# Patient Record
Sex: MALE | Race: WHITE | Employment: OTHER | URBAN - METROPOLITAN AREA
[De-identification: names, ages, dates, MRNs, and addresses within clinical notes are randomized per-mention and may not be internally consistent; named-entity substitution may affect disease eponyms.]

---

## 2017-08-10 PROBLEM — Z95.828 PORT-A-CATH IN PLACE: Status: ACTIVE | Noted: 2017-08-10

## 2019-07-30 ENCOUNTER — HOSPITAL ENCOUNTER (OUTPATIENT)
Dept: VASCULAR LAB | Age: 69
Discharge: HOME | End: 2019-07-30
Payer: OTHER GOVERNMENT

## 2019-07-30 PROCEDURE — 93971 EXTREMITY STUDY: CPT

## 2019-08-06 ENCOUNTER — HOSPITAL ENCOUNTER (OUTPATIENT)
Dept: VASCULAR LAB | Age: 69
Discharge: HOME | End: 2019-08-06
Payer: OTHER GOVERNMENT

## 2019-08-06 ENCOUNTER — PRE-PROCEDURE TELEPHONE (OUTPATIENT)
Dept: CARDIAC CATH/INVASIVE PROCEDURES | Age: 69
End: 2019-08-06

## 2019-08-06 PROCEDURE — 93971 EXTREMITY STUDY: CPT

## 2019-08-07 ENCOUNTER — HOSPITAL ENCOUNTER (OUTPATIENT)
Dept: INTERVENTIONAL RADIOLOGY/VASCULAR | Age: 69
Discharge: HOME | End: 2019-08-07
Attending: INTERNAL MEDICINE | Admitting: INTERNAL MEDICINE
Payer: OTHER GOVERNMENT

## 2019-08-07 VITALS — HEIGHT: 70 IN | TEMPERATURE: 97.8 F | WEIGHT: 194 LBS | BODY MASS INDEX: 27.77 KG/M2

## 2019-08-07 LAB
ANION GAP SERPL CALCULATED.3IONS-SCNC: 10 MMOL/L (ref 3–16)
BUN BLDV-MCNC: 31 MG/DL (ref 7–20)
CALCIUM SERPL-MCNC: 8.6 MG/DL (ref 8.3–10.6)
CHLORIDE BLD-SCNC: 99 MMOL/L (ref 99–110)
CO2: 27 MMOL/L (ref 21–32)
CREAT SERPL-MCNC: 1.3 MG/DL (ref 0.8–1.3)
GFR AFRICAN AMERICAN: >60
GFR NON-AFRICAN AMERICAN: 54
GLUCOSE BLD-MCNC: 154 MG/DL (ref 70–99)
HCT VFR BLD CALC: 27.8 % (ref 40.5–52.5)
HEMOGLOBIN: 9.2 G/DL (ref 13.5–17.5)
INR BLD: 1.18 (ref 0.86–1.14)
MCH RBC QN AUTO: 36.8 PG (ref 26–34)
MCHC RBC AUTO-ENTMCNC: 33.1 G/DL (ref 31–36)
MCV RBC AUTO: 111 FL (ref 80–100)
PDW BLD-RTO: 20.3 % (ref 12.4–15.4)
PLATELET # BLD: 305 K/UL (ref 135–450)
PMV BLD AUTO: 6.7 FL (ref 5–10.5)
POTASSIUM SERPL-SCNC: 4 MMOL/L (ref 3.5–5.1)
PROTHROMBIN TIME: 13.4 SEC (ref 9.8–13)
RBC # BLD: 2.51 M/UL (ref 4.2–5.9)
SODIUM BLD-SCNC: 136 MMOL/L (ref 136–145)
WBC # BLD: 6 K/UL (ref 4–11)

## 2019-08-07 PROCEDURE — C1725 CATH, TRANSLUMIN NON-LASER: HCPCS

## 2019-08-07 PROCEDURE — 37248 TRLUML BALO ANGIOP 1ST VEIN: CPT | Performed by: RADIOLOGY

## 2019-08-07 PROCEDURE — 99153 MOD SED SAME PHYS/QHP EA: CPT | Performed by: RADIOLOGY

## 2019-08-07 PROCEDURE — 85027 COMPLETE CBC AUTOMATED: CPT

## 2019-08-07 PROCEDURE — 2709999900 HC NON-CHARGEABLE SUPPLY

## 2019-08-07 PROCEDURE — 6360000002 HC RX W HCPCS

## 2019-08-07 PROCEDURE — 85610 PROTHROMBIN TIME: CPT

## 2019-08-07 PROCEDURE — 36010 PLACE CATHETER IN VEIN: CPT | Performed by: RADIOLOGY

## 2019-08-07 PROCEDURE — 6360000004 HC RX CONTRAST MEDICATION: Performed by: RADIOLOGY

## 2019-08-07 PROCEDURE — C1769 GUIDE WIRE: HCPCS

## 2019-08-07 PROCEDURE — C1894 INTRO/SHEATH, NON-LASER: HCPCS

## 2019-08-07 PROCEDURE — C1887 CATHETER, GUIDING: HCPCS

## 2019-08-07 PROCEDURE — 80048 BASIC METABOLIC PNL TOTAL CA: CPT

## 2019-08-07 PROCEDURE — 75820 VEIN X-RAY ARM/LEG: CPT | Performed by: RADIOLOGY

## 2019-08-07 PROCEDURE — 2500000003 HC RX 250 WO HCPCS

## 2019-08-07 PROCEDURE — 99152 MOD SED SAME PHYS/QHP 5/>YRS: CPT | Performed by: RADIOLOGY

## 2019-08-07 RX ORDER — ATORVASTATIN CALCIUM 20 MG/1
20 TABLET, FILM COATED ORAL DAILY
COMMUNITY

## 2019-08-07 RX ORDER — DIPHENHYDRAMINE HCL 50 MG
50 CAPSULE ORAL NIGHTLY PRN
Status: ON HOLD | COMMUNITY
End: 2022-06-25

## 2019-08-07 RX ORDER — FUROSEMIDE 20 MG/1
20 TABLET ORAL 2 TIMES DAILY
Status: ON HOLD | COMMUNITY
End: 2022-06-25

## 2019-08-07 RX ORDER — IODIXANOL 320 MG/ML
100 INJECTION, SOLUTION INTRAVASCULAR
Status: COMPLETED | OUTPATIENT
Start: 2019-08-07 | End: 2019-08-07

## 2019-08-07 RX ORDER — PREGABALIN 150 MG/1
150 CAPSULE ORAL 2 TIMES DAILY
COMMUNITY

## 2019-08-07 RX ADMIN — IODIXANOL 100 ML: 320 INJECTION, SOLUTION INTRAVASCULAR at 13:13

## 2019-08-07 NOTE — H&P
Patient:  Mello Farley   :   1950      Relevant clinical history, particularly as it involves the pending procedure, was reviewed and discussed. The procedure including risks and benefits was discussed at length with the patient (or designated family member) and all questions were answered. Informed consent to proceed with the procedure was given. Vital signs were monitored and documented by the Radiology nurse. Targeted physical examination  Heart : regular rate and rhythm  Lungs : clear, breathing easily  Condition : stable    [facility] nurses notes reviewed and agreed. 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  Last one 2014    ENDOSCOPY, COLON, DIAGNOSTIC      Upper GI prior to Cholecystectomy    FOREIGN BODY REMOVAL      LEG AMPUTATION BELOW KNEE Left 14    LEG SURGERY Left 14    biopsy left lower leg    LIPOMA RESECTION      OTHER SURGICAL HISTORY Left 10/22/2014    LEFT SUBCLAVIAN PORT REMOVAL              OTHER SURGICAL HISTORY Right 10/27/14    INSERTION OF SINGLE LUMEN PORT A CATH    TONSILLECTOMY      TONSILLECTOMY      childhood    TUNNELED VENOUS PORT PLACEMENT Right     Power Port       Allergies:  Compazine [prochlorperazine maleate]    Medications:   Home Meds  Current Facility-Administered Medications on File Prior to Encounter   Medication Dose

## 2022-04-26 ENCOUNTER — HOSPITAL ENCOUNTER (OUTPATIENT)
Dept: ULTRASOUND IMAGING | Age: 72
Discharge: HOME | End: 2022-04-26
Payer: OTHER GOVERNMENT

## 2022-04-26 DIAGNOSIS — C40.22: ICD-10-CM

## 2022-04-26 DIAGNOSIS — N18.4 CHRONIC KIDNEY DISEASE, STAGE IV (SEVERE) (HCC): ICD-10-CM

## 2022-04-26 PROCEDURE — 76770 US EXAM ABDO BACK WALL COMP: CPT

## 2022-06-24 ENCOUNTER — HOSPITAL ENCOUNTER (INPATIENT)
Age: 72
LOS: 2 days | Discharge: HOME HEALTH | DRG: 565 | End: 2022-06-26
Attending: STUDENT IN AN ORGANIZED HEALTH CARE EDUCATION/TRAINING PROGRAM | Admitting: INTERNAL MEDICINE
Payer: OTHER GOVERNMENT

## 2022-06-24 ENCOUNTER — APPOINTMENT (OUTPATIENT)
Dept: GENERAL RADIOLOGY | Age: 72
DRG: 565 | End: 2022-06-24
Payer: OTHER GOVERNMENT

## 2022-06-24 DIAGNOSIS — R79.82 ELEVATED C-REACTIVE PROTEIN (CRP): ICD-10-CM

## 2022-06-24 DIAGNOSIS — L03.116 CELLULITIS OF LEFT LOWER LIMB: Primary | ICD-10-CM

## 2022-06-24 DIAGNOSIS — R70.0 ELEVATED ERYTHROCYTE SEDIMENTATION RATE: ICD-10-CM

## 2022-06-24 PROBLEM — L03.90 CELLULITIS: Status: ACTIVE | Noted: 2022-06-24

## 2022-06-24 LAB
ALBUMIN SERPL-MCNC: 3.9 G/DL (ref 3.4–5)
ALP BLD-CCNC: 70 U/L (ref 40–129)
ALT SERPL-CCNC: 14 U/L (ref 10–40)
ANION GAP SERPL CALCULATED.3IONS-SCNC: 10 MMOL/L (ref 3–16)
AST SERPL-CCNC: 14 U/L (ref 15–37)
BASOPHILS ABSOLUTE: 0 K/UL (ref 0–0.2)
BASOPHILS RELATIVE PERCENT: 0.3 %
BILIRUB SERPL-MCNC: 0.4 MG/DL (ref 0–1)
BILIRUBIN DIRECT: <0.2 MG/DL (ref 0–0.3)
BILIRUBIN, INDIRECT: ABNORMAL MG/DL (ref 0–1)
BUN BLDV-MCNC: 39 MG/DL (ref 7–20)
C-REACTIVE PROTEIN: 32.8 MG/L (ref 0–5.1)
CALCIUM SERPL-MCNC: 9 MG/DL (ref 8.3–10.6)
CHLORIDE BLD-SCNC: 103 MMOL/L (ref 99–110)
CO2: 26 MMOL/L (ref 21–32)
CREAT SERPL-MCNC: 2.5 MG/DL (ref 0.8–1.3)
EOSINOPHILS ABSOLUTE: 0.1 K/UL (ref 0–0.6)
EOSINOPHILS RELATIVE PERCENT: 1.7 %
GFR AFRICAN AMERICAN: 31
GFR NON-AFRICAN AMERICAN: 25
GLUCOSE BLD-MCNC: 130 MG/DL (ref 70–99)
GLUCOSE BLD-MCNC: 82 MG/DL (ref 70–99)
HCT VFR BLD CALC: 36.2 % (ref 40.5–52.5)
HEMOGLOBIN: 12.2 G/DL (ref 13.5–17.5)
LACTIC ACID: 1.1 MMOL/L (ref 0.4–2)
LYMPHOCYTES ABSOLUTE: 0.8 K/UL (ref 1–5.1)
LYMPHOCYTES RELATIVE PERCENT: 12.7 %
MCH RBC QN AUTO: 34.6 PG (ref 26–34)
MCHC RBC AUTO-ENTMCNC: 33.6 G/DL (ref 31–36)
MCV RBC AUTO: 103 FL (ref 80–100)
MONOCYTES ABSOLUTE: 0.5 K/UL (ref 0–1.3)
MONOCYTES RELATIVE PERCENT: 8.5 %
NEUTROPHILS ABSOLUTE: 4.8 K/UL (ref 1.7–7.7)
NEUTROPHILS RELATIVE PERCENT: 76.8 %
PDW BLD-RTO: 12.5 % (ref 12.4–15.4)
PERFORMED ON: ABNORMAL
PLATELET # BLD: 238 K/UL (ref 135–450)
PMV BLD AUTO: 7.7 FL (ref 5–10.5)
POTASSIUM REFLEX MAGNESIUM: 4.3 MMOL/L (ref 3.5–5.1)
RBC # BLD: 3.51 M/UL (ref 4.2–5.9)
SEDIMENTATION RATE, ERYTHROCYTE: 39 MM/HR (ref 0–20)
SODIUM BLD-SCNC: 139 MMOL/L (ref 136–145)
TOTAL PROTEIN: 6.6 G/DL (ref 6.4–8.2)
WBC # BLD: 6.3 K/UL (ref 4–11)

## 2022-06-24 PROCEDURE — 2580000003 HC RX 258: Performed by: INTERNAL MEDICINE

## 2022-06-24 PROCEDURE — 86140 C-REACTIVE PROTEIN: CPT

## 2022-06-24 PROCEDURE — 85652 RBC SED RATE AUTOMATED: CPT

## 2022-06-24 PROCEDURE — 73590 X-RAY EXAM OF LOWER LEG: CPT

## 2022-06-24 PROCEDURE — 99285 EMERGENCY DEPT VISIT HI MDM: CPT

## 2022-06-24 PROCEDURE — 1200000000 HC SEMI PRIVATE

## 2022-06-24 PROCEDURE — 6360000002 HC RX W HCPCS: Performed by: INTERNAL MEDICINE

## 2022-06-24 PROCEDURE — 83605 ASSAY OF LACTIC ACID: CPT

## 2022-06-24 PROCEDURE — 85025 COMPLETE CBC W/AUTO DIFF WBC: CPT

## 2022-06-24 PROCEDURE — 80048 BASIC METABOLIC PNL TOTAL CA: CPT

## 2022-06-24 PROCEDURE — 6370000000 HC RX 637 (ALT 250 FOR IP): Performed by: INTERNAL MEDICINE

## 2022-06-24 PROCEDURE — 2580000003 HC RX 258: Performed by: STUDENT IN AN ORGANIZED HEALTH CARE EDUCATION/TRAINING PROGRAM

## 2022-06-24 PROCEDURE — 80076 HEPATIC FUNCTION PANEL: CPT

## 2022-06-24 PROCEDURE — 6360000002 HC RX W HCPCS: Performed by: STUDENT IN AN ORGANIZED HEALTH CARE EDUCATION/TRAINING PROGRAM

## 2022-06-24 RX ORDER — INSULIN LISPRO 100 [IU]/ML
0-6 INJECTION, SOLUTION INTRAVENOUS; SUBCUTANEOUS NIGHTLY
Status: DISCONTINUED | OUTPATIENT
Start: 2022-06-24 | End: 2022-06-26 | Stop reason: HOSPADM

## 2022-06-24 RX ORDER — SODIUM CHLORIDE 0.9 % (FLUSH) 0.9 %
5-40 SYRINGE (ML) INJECTION EVERY 12 HOURS SCHEDULED
Status: DISCONTINUED | OUTPATIENT
Start: 2022-06-24 | End: 2022-06-26 | Stop reason: HOSPADM

## 2022-06-24 RX ORDER — ENOXAPARIN SODIUM 100 MG/ML
40 INJECTION SUBCUTANEOUS DAILY
Status: DISCONTINUED | OUTPATIENT
Start: 2022-06-24 | End: 2022-06-26

## 2022-06-24 RX ORDER — POLYETHYLENE GLYCOL 3350 17 G/17G
17 POWDER, FOR SOLUTION ORAL DAILY PRN
Status: DISCONTINUED | OUTPATIENT
Start: 2022-06-24 | End: 2022-06-26 | Stop reason: HOSPADM

## 2022-06-24 RX ORDER — SODIUM CHLORIDE 0.9 % (FLUSH) 0.9 %
5-40 SYRINGE (ML) INJECTION PRN
Status: DISCONTINUED | OUTPATIENT
Start: 2022-06-24 | End: 2022-06-26 | Stop reason: HOSPADM

## 2022-06-24 RX ORDER — OXYCODONE HYDROCHLORIDE AND ACETAMINOPHEN 5; 325 MG/1; MG/1
1 TABLET ORAL EVERY 4 HOURS PRN
Status: DISCONTINUED | OUTPATIENT
Start: 2022-06-24 | End: 2022-06-26 | Stop reason: HOSPADM

## 2022-06-24 RX ORDER — ONDANSETRON 2 MG/ML
4 INJECTION INTRAMUSCULAR; INTRAVENOUS EVERY 6 HOURS PRN
Status: DISCONTINUED | OUTPATIENT
Start: 2022-06-24 | End: 2022-06-26 | Stop reason: HOSPADM

## 2022-06-24 RX ORDER — DEXTROSE MONOHYDRATE 50 MG/ML
100 INJECTION, SOLUTION INTRAVENOUS PRN
Status: DISCONTINUED | OUTPATIENT
Start: 2022-06-24 | End: 2022-06-26 | Stop reason: HOSPADM

## 2022-06-24 RX ORDER — INSULIN LISPRO 100 [IU]/ML
0-12 INJECTION, SOLUTION INTRAVENOUS; SUBCUTANEOUS
Status: DISCONTINUED | OUTPATIENT
Start: 2022-06-25 | End: 2022-06-26 | Stop reason: HOSPADM

## 2022-06-24 RX ORDER — ACETAMINOPHEN 325 MG/1
650 TABLET ORAL EVERY 6 HOURS PRN
Status: DISCONTINUED | OUTPATIENT
Start: 2022-06-24 | End: 2022-06-26 | Stop reason: HOSPADM

## 2022-06-24 RX ORDER — SODIUM CHLORIDE 9 MG/ML
INJECTION, SOLUTION INTRAVENOUS PRN
Status: DISCONTINUED | OUTPATIENT
Start: 2022-06-24 | End: 2022-06-26 | Stop reason: HOSPADM

## 2022-06-24 RX ORDER — ACETAMINOPHEN 650 MG/1
650 SUPPOSITORY RECTAL EVERY 6 HOURS PRN
Status: DISCONTINUED | OUTPATIENT
Start: 2022-06-24 | End: 2022-06-26 | Stop reason: HOSPADM

## 2022-06-24 RX ORDER — ONDANSETRON 4 MG/1
4 TABLET, ORALLY DISINTEGRATING ORAL EVERY 8 HOURS PRN
Status: DISCONTINUED | OUTPATIENT
Start: 2022-06-24 | End: 2022-06-26 | Stop reason: HOSPADM

## 2022-06-24 RX ORDER — SODIUM CHLORIDE 9 MG/ML
INJECTION, SOLUTION INTRAVENOUS CONTINUOUS
Status: DISCONTINUED | OUTPATIENT
Start: 2022-06-24 | End: 2022-06-26 | Stop reason: HOSPADM

## 2022-06-24 RX ADMIN — VANCOMYCIN HYDROCHLORIDE 1500 MG: 10 INJECTION, POWDER, LYOPHILIZED, FOR SOLUTION INTRAVENOUS at 21:43

## 2022-06-24 RX ADMIN — OXYCODONE AND ACETAMINOPHEN 1 TABLET: 5; 325 TABLET ORAL at 23:58

## 2022-06-24 RX ADMIN — SODIUM CHLORIDE, PRESERVATIVE FREE 10 ML: 5 INJECTION INTRAVENOUS at 21:43

## 2022-06-24 RX ADMIN — SODIUM CHLORIDE: 9 INJECTION, SOLUTION INTRAVENOUS at 21:42

## 2022-06-24 RX ADMIN — ENOXAPARIN SODIUM 40 MG: 40 INJECTION SUBCUTANEOUS at 23:37

## 2022-06-24 ASSESSMENT — PAIN DESCRIPTION - FREQUENCY
FREQUENCY: INTERMITTENT
FREQUENCY: INTERMITTENT

## 2022-06-24 ASSESSMENT — PAIN - FUNCTIONAL ASSESSMENT
PAIN_FUNCTIONAL_ASSESSMENT: ACTIVITIES ARE NOT PREVENTED
PAIN_FUNCTIONAL_ASSESSMENT: 0-10
PAIN_FUNCTIONAL_ASSESSMENT: ACTIVITIES ARE NOT PREVENTED

## 2022-06-24 ASSESSMENT — ENCOUNTER SYMPTOMS
BACK PAIN: 0
ABDOMINAL DISTENTION: 0
SHORTNESS OF BREATH: 0
CHOKING: 0
ABDOMINAL PAIN: 0
CHEST TIGHTNESS: 0
WHEEZING: 0
NAUSEA: 0
STRIDOR: 0
DIARRHEA: 0
SINUS PRESSURE: 0
COUGH: 0
VOMITING: 0
SORE THROAT: 0
BLOOD IN STOOL: 0
CONSTIPATION: 1

## 2022-06-24 ASSESSMENT — PAIN DESCRIPTION - ONSET
ONSET: ON-GOING
ONSET: ON-GOING

## 2022-06-24 ASSESSMENT — PAIN SCALES - GENERAL
PAINLEVEL_OUTOF10: 5
PAINLEVEL_OUTOF10: 6
PAINLEVEL_OUTOF10: 0
PAINLEVEL_OUTOF10: 0

## 2022-06-24 ASSESSMENT — PAIN DESCRIPTION - LOCATION
LOCATION: LEG
LOCATION: LEG

## 2022-06-24 ASSESSMENT — PAIN DESCRIPTION - DESCRIPTORS
DESCRIPTORS: ACHING;PRESSURE
DESCRIPTORS: ACHING;PRESSURE

## 2022-06-24 ASSESSMENT — PAIN DESCRIPTION - ORIENTATION
ORIENTATION: LEFT;LOWER
ORIENTATION: LEFT;LOWER

## 2022-06-24 ASSESSMENT — PAIN DESCRIPTION - PAIN TYPE
TYPE: ACUTE PAIN
TYPE: ACUTE PAIN

## 2022-06-24 NOTE — ED PROVIDER NOTES
ED Attending Attestation Note     Date of evaluation: 6/24/2022    I supervised the care provided by the resident physician, Riky Herrera MD.     I have seen and examined the patient, agree with the workup, evaluation, management and diagnosis. The care plan has been discussed. My assessment reveals a 66 y.o. 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 Denies any fever/ chills. States has been on oral ATB for the past 3-4 days )     No obvious acute distress at this time. Left BKA with 5-day history of erythema, swelling, numbness in the same leg/stump. No fever. Has not been seen by his PCP or by anyone prior to today. His [facility] physician was nice enough to call him in a prescription for Keflex, he is on day 5 of Keflex today, though there is still marked inflammation. He is afebrile and hemodynamically stable here in the ER. There is no obvious drainage, there is warmth about the lower third of the stump. We will get plain film x-ray, CBC, BMP and inflammatory markers      ED data including laboratory and imaging studies were reviewed by me and considered in medical decision making, and I agree with the interpretations as documented, with the following exceptions: None      Clinical Impression     1. Cellulitis of left lower limb    2. Elevated erythrocyte sedimentation rate    3. Elevated C-reactive protein (CRP)          Disposition     DISPOSITION Admitted 06/24/2022 07:06:14 PM      Ernie Batres MD  Emergency Medicine  [facility]       Herrera Mckeon MD  06/26/22 [id number]

## 2022-06-24 NOTE — PROGRESS NOTES
Clinical Pharmacy Consult Note       Pharmacy consulted by Dr. Jessica Chan in ED to order first dose of vancomycin. Indication :   ssti    Patient Data:     Recent Labs     06/24/22  1729      K 4.3      CO2 26   BUN 39*   CREATININE 2.5*       CrCl cannot be calculated (Unknown ideal weight. ). Recent Labs     06/24/22  1729   WBC 6.3   HGB 12.2*   HCT 36.2*   .0*          Height:  @[employer](11:LAST)@  Weight: @[employer](14:LAST)@    Plan: Will order vancomycin 1500 x 1 dose, to be administered in ED. Please note this consult covers ED vancomycin dose only. If admitting provider would like further vancomycin dose management by pharmacy, please place additional consult order. Thank you for the consult. Please call with questions.   Karl Alcala, PharmD  Main Pharmacy: [id number]

## 2022-06-24 NOTE — ED PROVIDER NOTES
1 [facility]  EMERGENCY DEPARTMENT ENCOUNTER          [facility] RESIDENT NOTE       Date of evaluation: 6/24/2022    Chief Complaint     Leg Swelling (left lower BKA states has been swelling the past few days and is unable to get prostetic on. Denies any fever/ chills. States has been on oral ATB for the past 3-4 days )      History of Present Illness     Kirsten Blount is a 66 y.o. 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 Patient reports that since last Saturday he has had swelling at his left BKA site and he is unable to get his prosthetic leg on. He endorses erythema, swelling, tenderness to palpation that has improved on a course of oral Keflex given to him by Dr. Sam Desir. He is currently on day 4 of his 7-day antibiotic course, with improvement in pain, swelling, erythema. He noticed a wound at the bottom of his BKA which he believes could have been the source of his infection. He has full range of motion and strength in his left extremity. He denies any fevers or chills, nausea, vomiting, diarrhea. He had leg surgery on 12-8-2014 due to his osteosarcoma. He has not been on chemotherapy or radiation therapy for many years. He denies any history of skin infections or prior abscesses. He is concerned on how he will get home and move around with his prosthetic leg not fitting due to the swelling. Review of Systems     Review of Systems   Constitutional: Negative for activity change, chills, fatigue and fever. HENT: Negative for sinus pressure, sneezing and sore throat. Eyes: Negative for visual disturbance. Respiratory: Negative for cough, choking, chest tightness, shortness of breath, wheezing and stridor. Cardiovascular: Negative for chest pain, palpitations and leg swelling.    Gastrointestinal: Positive for constipation. Negative for abdominal distention, abdominal pain, blood in stool, diarrhea, nausea and vomiting. Endocrine: Negative for polyuria. Genitourinary: Negative for difficulty urinating and dysuria. Musculoskeletal: Positive for joint swelling. Negative for arthralgias, back pain and neck pain. Left lower extremity swelling, erythema, tenderness. Neurological: Positive for weakness (Chronic right lower extremity weakness). Negative for dizziness, tremors, syncope, light-headedness and headaches. Psychiatric/Behavioral: Negative for agitation, behavioral problems and confusion. Past Medical, Surgical, Family, and Social History     He has a past medical history of Arthritis, Arthritis, [address] Cancer ([facility] Chondroblastic osteosarcoma ([facility] Diabetes mellitus ([facility] Diabetes mellitus ([address] Hyperlipidemia, Osteosarcoma of bone ([facility] and Unspecified cerebral artery occlusion with cerebral infarction. He has a past surgical history that includes Cholecystectomy; lipoma resection (2013); Foreign Body Removal; Colonoscopy; Leg Surgery (Left, 8/18/14); Tonsillectomy; other surgical history (Left, 10/22/2014); other surgical history (Right, 10/27/14); Tonsillectomy; Abdomen surgery; Cholecystectomy (1977); Colonoscopy; Endoscopy, colon, diagnostic; Tunneled venous port placement (Right); and Leg amputation below knee (Left, 12/08/14). His family history includes Emphysema in his mother; Other in his brother. He reports that he quit smoking about 40 years ago. He has a 12.00 pack-year smoking history. He does not have any smokeless tobacco history on file. He reports current alcohol use. He reports that he does not use drugs. Medications     Previous Medications    AMOXICILLIN (AMOXIL) 500 MG CAPSULE    Take 4 tablets all at once, one hour prior to dental procedure.     ASPIRIN 81 MG TABLET    Take 81 mg by mouth daily    ATORVASTATIN (LIPITOR) 20 MG TABLET    Take 20 mg by mouth daily    DIPHENHYDRAMINE (BENADRYL) 50 MG CAPSULE    Take 50 mg by mouth nightly as needed for Itching    FUROSEMIDE (LASIX) 20 MG TABLET    Take 20 mg by mouth 2 times daily Indications: for 7 days, started 8/6/19    GABAPENTIN (NEURONTIN) 100 MG CAPSULE    Take 100 mg by mouth 3 times daily     GLIPIZIDE (GLUCOTROL) 10 MG TABLET    Take 10 mg by mouth 2 times daily (before meals). INSULIN GLARGINE (TOUJEO SOLOSTAR SC)    Inject 20 Units into the skin daily    METFORMIN (GLUCOPHAGE) 500 MG TABLET    Take 850 mg by mouth 2 times daily (with meals)     PREGABALIN (LYRICA) 150 MG CAPSULE    Take 150 mg by mouth 2 times daily. SIMVASTATIN (ZOCOR) 40 MG TABLET    Take 20 mg by mouth nightly. VITAMIN D (CHOLECALCIFEROL) 1000 UNIT TABS TABLET    Take 1,000 Units by mouth daily Indications: 2 tablets daily       Allergies     He is allergic to compazine [prochlorperazine maleate]. Physical Exam     INITIAL VITALS: BP: 109/84, Temp: 97.6 °F (36.4 °C), Heart Rate: 58, Resp: 18, SpO2: 99 %   Physical Exam  Constitutional:       General: He is not in acute distress. HENT:      Head: Normocephalic. Right Ear: External ear normal.      Left Ear: External ear normal.      Nose: Nose normal.      Mouth/Throat:      Mouth: Mucous membranes are moist.      Pharynx: Oropharynx is clear. No oropharyngeal exudate. Eyes:      Extraocular Movements: Extraocular movements intact. Conjunctiva/sclera: Conjunctivae normal.      Pupils: Pupils are equal, round, and reactive to light. Cardiovascular:      Rate and Rhythm: Normal rate and regular rhythm. Pulses: Normal pulses. Heart sounds: Normal heart sounds. No murmur heard. No gallop. Pulmonary:      Effort: Pulmonary effort is normal. No respiratory distress. Breath sounds: Normal breath sounds. No stridor. No wheezing, rhonchi or rales. Abdominal:      General: Bowel sounds are normal. There is no distension.       Palpations: Abdomen is soft. Tenderness: There is no abdominal tenderness. There is no guarding or rebound. Musculoskeletal:      Cervical back: Normal range of motion. No rigidity. Left lower leg: Edema present. Comments: Patient has a left-sided BKA. He has a scabbed over wound at the distal end of the extremity. On the anterior distal portion of the left lower extremity he has erythema, swelling, tenderness to palpation that does not extend to the knee. He has full range of motion with no sensory deficits. He has full strength in the left lower extremity with 4+ strength in the right lower extremity. There is no discharge or bleeding from the extremity. Skin:     General: Skin is warm. Coloration: Skin is not jaundiced or pale. Findings: Erythema and lesion present. No bruising. Neurological:      Mental Status: He is alert and oriented to person, place, and time. Mental status is at baseline. Cranial Nerves: No cranial nerve deficit. Sensory: No sensory deficit. Motor: No weakness. Psychiatric:         Mood and Affect: Mood normal.         Behavior: Behavior normal.         Diagnostic Results       RADIOLOGY:  XR TIBIA FIBULA LEFT (2 VIEWS)   Final Result      Diffuse swelling. No acute osseous findings.           LABS:   Results for orders placed or performed during the hospital encounter of 06/24/22   CBC with Auto Differential   Result Value Ref Range    WBC 6.3 4.0 - 11.0 K/uL    RBC 3.51 (L) 4.20 - 5.90 M/uL    Hemoglobin 12.2 (L) 13.5 - 17.5 g/dL    Hematocrit 36.2 (L) 40.5 - 52.5 %    .0 (H) 80.0 - 100.0 fL    MCH 34.6 (H) 26.0 - 34.0 pg    MCHC 33.6 31.0 - 36.0 g/dL    RDW 12.5 12.4 - 15.4 %    Platelets 230 365 - 705 K/uL    MPV 7.7 5.0 - 10.5 fL    Neutrophils % 76.8 %    Lymphocytes % 12.7 %    Monocytes % 8.5 %    Eosinophils % 1.7 %    Basophils % 0.3 %    Neutrophils Absolute 4.8 1.7 - 7.7 K/uL    Lymphocytes Absolute 0.8 (L) 1.0 - 5.1 K/uL Monocytes Absolute 0.5 0.0 - 1.3 K/uL    Eosinophils Absolute 0.1 0.0 - 0.6 K/uL    Basophils Absolute 0.0 0.0 - 0.2 K/uL   Basic Metabolic Panel w/ Reflex to MG   Result Value Ref Range    Sodium 139 136 - 145 mmol/L    Potassium reflex Magnesium 4.3 3.5 - 5.1 mmol/L    Chloride 103 99 - 110 mmol/L    CO2 26 21 - 32 mmol/L    Anion Gap 10 3 - 16    Glucose 82 70 - 99 mg/dL    BUN 39 (H) 7 - 20 mg/dL    CREATININE 2.5 (H) 0.8 - 1.3 mg/dL    GFR Non-African American 25 (A) >60    GFR  31 (A) >60    Calcium 9.0 8.3 - 10.6 mg/dL   Hepatic Function Panel   Result Value Ref Range    Total Protein 6.6 6.4 - 8.2 g/dL    Albumin 3.9 3.4 - 5.0 g/dL    Alkaline Phosphatase 70 40 - 129 U/L    ALT 14 10 - 40 U/L    AST 14 (L) 15 - 37 U/L    Total Bilirubin 0.4 0.0 - 1.0 mg/dL    Bilirubin, Direct <0.2 0.0 - 0.3 mg/dL    Bilirubin, Indirect see below 0.0 - 1.0 mg/dL   Lactic Acid   Result Value Ref Range    Lactic Acid 1.1 0.4 - 2.0 mmol/L   Sedimentation Rate   Result Value Ref Range    Sed Rate 39 (H) 0 - 20 mm/Hr   C-Reactive Protein   Result Value Ref Range    CRP 32.8 (H) 0.0 - 5.1 mg/L       ED BEDSIDE ULTRASOUND:  No results found. RECENT VITALS:  BP: 109/84, Temp: 97.6 °F (36.4 °C),Heart Rate: 58, Resp: 18, SpO2: 99 %     Procedures     None    ED Course     Nursing Notes, Past Medical Hx, Past Surgical Hx, Social Hx, Allergies, and FamilyHx were reviewed. The patient was giventhe following medications:  No orders of the defined types were placed in this encounter. CONSULTS:  IP CONSULT TO HOSPITALIST  IP CONSULT TO [address] [place] [address] / ASSESSMENT / Michelle Timur is a 66 y.o. 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   Given that the patient has persistent erythema and swelling in the BKA site despite being on day 3 or 4 of his antibiotic treatment there is a concern for a deeper infection. X-ray did not show any acute findings but patient may require an MRI to further assess the joint. He has a history of cancer and metastasis but is not currently undergoing chemotherapy. He has a greatly elevated CRP and ESR. This patient was also evaluated by the attending physician. All care plans were discussed and agreed upon. Clinical Impression     Cellulitis of the left lower extremity    Disposition     PATIENT REFERRED TO:  No follow-up provider specified.     DISCHARGE MEDICATIONS:  New Prescriptions    No medications on file       DISPOSITION    Admit to hospitalist under observation     Kurtis Phoenix, MD  Resident  06/24/22 [id number]

## 2022-06-25 LAB
ANION GAP SERPL CALCULATED.3IONS-SCNC: 12 MMOL/L (ref 3–16)
BASOPHILS ABSOLUTE: 0 K/UL (ref 0–0.2)
BASOPHILS RELATIVE PERCENT: 0.7 %
BILIRUBIN URINE: NEGATIVE
BLOOD, URINE: ABNORMAL
BUN BLDV-MCNC: 43 MG/DL (ref 7–20)
C-REACTIVE PROTEIN: 24.5 MG/L (ref 0–5.1)
C3 COMPLEMENT: 108.6 MG/DL (ref 90–180)
C4 COMPLEMENT: 36.5 MG/DL (ref 10–40)
CALCIUM SERPL-MCNC: 9.1 MG/DL (ref 8.3–10.6)
CHLORIDE BLD-SCNC: 106 MMOL/L (ref 99–110)
CLARITY: CLEAR
CO2: 24 MMOL/L (ref 21–32)
COLOR: YELLOW
CREAT SERPL-MCNC: 2.6 MG/DL (ref 0.8–1.3)
CREATININE URINE: 72 MG/DL (ref 39–259)
CREATININE URINE: <4.2 MG/DL (ref 39–259)
EOSINOPHILS ABSOLUTE: 0.1 K/UL (ref 0–0.6)
EOSINOPHILS RELATIVE PERCENT: 2.4 %
EPITHELIAL CELLS, UA: NORMAL /HPF (ref 0–5)
GFR AFRICAN AMERICAN: 29
GFR NON-AFRICAN AMERICAN: 24
GLUCOSE BLD-MCNC: 116 MG/DL (ref 70–99)
GLUCOSE BLD-MCNC: 153 MG/DL (ref 70–99)
GLUCOSE BLD-MCNC: 169 MG/DL (ref 70–99)
GLUCOSE BLD-MCNC: 193 MG/DL (ref 70–99)
GLUCOSE BLD-MCNC: 204 MG/DL (ref 70–99)
GLUCOSE BLD-MCNC: 90 MG/DL (ref 70–99)
GLUCOSE URINE: NEGATIVE MG/DL
HAV IGM SER IA-ACNC: NORMAL
HCT VFR BLD CALC: 36.9 % (ref 40.5–52.5)
HEMOGLOBIN: 12.2 G/DL (ref 13.5–17.5)
HEPATITIS B CORE IGM ANTIBODY: NORMAL
HEPATITIS B SURFACE ANTIGEN INTERPRETATION: NORMAL
HEPATITIS C ANTIBODY INTERPRETATION: NORMAL
KETONES, URINE: NEGATIVE MG/DL
LEUKOCYTE ESTERASE, URINE: NEGATIVE
LYMPHOCYTES ABSOLUTE: 1 K/UL (ref 1–5.1)
LYMPHOCYTES RELATIVE PERCENT: 19.7 %
MCH RBC QN AUTO: 34.4 PG (ref 26–34)
MCHC RBC AUTO-ENTMCNC: 33.1 G/DL (ref 31–36)
MCV RBC AUTO: 103.9 FL (ref 80–100)
MICROSCOPIC EXAMINATION: YES
MONOCYTES ABSOLUTE: 0.4 K/UL (ref 0–1.3)
MONOCYTES RELATIVE PERCENT: 7.5 %
MRSA SCREEN RT-PCR: NORMAL
NEUTROPHILS ABSOLUTE: 3.6 K/UL (ref 1.7–7.7)
NEUTROPHILS RELATIVE PERCENT: 69.7 %
NITRITE, URINE: NEGATIVE
PDW BLD-RTO: 12.5 % (ref 12.4–15.4)
PERFORMED ON: ABNORMAL
PERFORMED ON: NORMAL
PH UA: 6 (ref 5–8)
PLATELET # BLD: 241 K/UL (ref 135–450)
PMV BLD AUTO: 7.8 FL (ref 5–10.5)
POTASSIUM REFLEX MAGNESIUM: 4.5 MMOL/L (ref 3.5–5.1)
PROTEIN PROTEIN: 13 MG/DL
PROTEIN UA: NEGATIVE MG/DL
PROTEIN/CREAT RATIO: 0.2 MG/DL
RBC # BLD: 3.55 M/UL (ref 4.2–5.9)
RBC UA: NORMAL /HPF (ref 0–4)
SODIUM BLD-SCNC: 142 MMOL/L (ref 136–145)
SODIUM URINE: 96 MMOL/L
SPECIFIC GRAVITY UA: 1.02 (ref 1–1.03)
URINE REFLEX TO CULTURE: ABNORMAL
URINE TYPE: ABNORMAL
UROBILINOGEN, URINE: 0.2 E.U./DL
VANCOMYCIN RANDOM: 17.4 UG/ML
WBC # BLD: 5.2 K/UL (ref 4–11)
WBC UA: NORMAL /HPF (ref 0–5)

## 2022-06-25 PROCEDURE — 92526 ORAL FUNCTION THERAPY: CPT

## 2022-06-25 PROCEDURE — 36415 COLL VENOUS BLD VENIPUNCTURE: CPT

## 2022-06-25 PROCEDURE — 6370000000 HC RX 637 (ALT 250 FOR IP): Performed by: INTERNAL MEDICINE

## 2022-06-25 PROCEDURE — 6360000002 HC RX W HCPCS: Performed by: INTERNAL MEDICINE

## 2022-06-25 PROCEDURE — 92610 EVALUATE SWALLOWING FUNCTION: CPT

## 2022-06-25 PROCEDURE — 82784 ASSAY IGA/IGD/IGG/IGM EACH: CPT

## 2022-06-25 PROCEDURE — 86160 COMPLEMENT ANTIGEN: CPT

## 2022-06-25 PROCEDURE — 82570 ASSAY OF URINE CREATININE: CPT

## 2022-06-25 PROCEDURE — 1200000000 HC SEMI PRIVATE

## 2022-06-25 PROCEDURE — 83883 ASSAY NEPHELOMETRY NOT SPEC: CPT

## 2022-06-25 PROCEDURE — 87390 HIV-1 AG IA: CPT

## 2022-06-25 PROCEDURE — 80048 BASIC METABOLIC PNL TOTAL CA: CPT

## 2022-06-25 PROCEDURE — 2580000003 HC RX 258: Performed by: INTERNAL MEDICINE

## 2022-06-25 PROCEDURE — 99253 IP/OBS CNSLTJ NEW/EST LOW 45: CPT | Performed by: INTERNAL MEDICINE

## 2022-06-25 PROCEDURE — 86140 C-REACTIVE PROTEIN: CPT

## 2022-06-25 PROCEDURE — 84156 ASSAY OF PROTEIN URINE: CPT

## 2022-06-25 PROCEDURE — 80074 ACUTE HEPATITIS PANEL: CPT

## 2022-06-25 PROCEDURE — 84300 ASSAY OF URINE SODIUM: CPT

## 2022-06-25 PROCEDURE — 86038 ANTINUCLEAR ANTIBODIES: CPT

## 2022-06-25 PROCEDURE — 86701 HIV-1ANTIBODY: CPT

## 2022-06-25 PROCEDURE — 85025 COMPLETE CBC W/AUTO DIFF WBC: CPT

## 2022-06-25 PROCEDURE — 84155 ASSAY OF PROTEIN SERUM: CPT

## 2022-06-25 PROCEDURE — 86702 HIV-2 ANTIBODY: CPT

## 2022-06-25 PROCEDURE — 84165 PROTEIN E-PHORESIS SERUM: CPT

## 2022-06-25 PROCEDURE — 80202 ASSAY OF VANCOMYCIN: CPT

## 2022-06-25 PROCEDURE — 81001 URINALYSIS AUTO W/SCOPE: CPT

## 2022-06-25 PROCEDURE — 87641 MR-STAPH DNA AMP PROBE: CPT

## 2022-06-25 RX ORDER — MELATONIN 10 MG
10 CAPSULE ORAL NIGHTLY PRN
COMMUNITY

## 2022-06-25 RX ORDER — PREGABALIN 75 MG/1
75 CAPSULE ORAL 2 TIMES DAILY
Status: DISCONTINUED | OUTPATIENT
Start: 2022-06-25 | End: 2022-06-26 | Stop reason: HOSPADM

## 2022-06-25 RX ORDER — CALCIUM CARBONATE 200(500)MG
500 TABLET,CHEWABLE ORAL 3 TIMES DAILY PRN
Status: DISCONTINUED | OUTPATIENT
Start: 2022-06-25 | End: 2022-06-26 | Stop reason: HOSPADM

## 2022-06-25 RX ORDER — CEPHALEXIN 500 MG/1
500 CAPSULE ORAL 3 TIMES DAILY
Status: ON HOLD | COMMUNITY
Start: 2022-06-20 | End: 2022-06-26 | Stop reason: HOSPADM

## 2022-06-25 RX ORDER — ACETAMINOPHEN/DIPHENHYDRAMINE 500MG-25MG
1 TABLET ORAL NIGHTLY PRN
COMMUNITY

## 2022-06-25 RX ORDER — ASPIRIN 81 MG/1
81 TABLET ORAL DAILY
Status: DISCONTINUED | OUTPATIENT
Start: 2022-06-25 | End: 2022-06-26 | Stop reason: HOSPADM

## 2022-06-25 RX ORDER — ATORVASTATIN CALCIUM 20 MG/1
20 TABLET, FILM COATED ORAL DAILY
Status: DISCONTINUED | OUTPATIENT
Start: 2022-06-25 | End: 2022-06-26 | Stop reason: HOSPADM

## 2022-06-25 RX ORDER — MECOBALAMIN 5000 MCG
5 TABLET,DISINTEGRATING ORAL NIGHTLY PRN
Status: DISCONTINUED | OUTPATIENT
Start: 2022-06-25 | End: 2022-06-26 | Stop reason: HOSPADM

## 2022-06-25 RX ORDER — DIPHENHYDRAMINE HYDROCHLORIDE 50 MG/ML
12.5 INJECTION INTRAMUSCULAR; INTRAVENOUS ONCE
Status: COMPLETED | OUTPATIENT
Start: 2022-06-25 | End: 2022-06-25

## 2022-06-25 RX ADMIN — ATORVASTATIN CALCIUM 20 MG: 20 TABLET, FILM COATED ORAL at 14:58

## 2022-06-25 RX ADMIN — ENOXAPARIN SODIUM 40 MG: 40 INJECTION SUBCUTANEOUS at 09:32

## 2022-06-25 RX ADMIN — CEFTRIAXONE 1000 MG: 1 INJECTION, POWDER, FOR SOLUTION INTRAMUSCULAR; INTRAVENOUS at 11:42

## 2022-06-25 RX ADMIN — Medication 5 MG: at 20:31

## 2022-06-25 RX ADMIN — SODIUM CHLORIDE: 9 INJECTION, SOLUTION INTRAVENOUS at 23:54

## 2022-06-25 RX ADMIN — ASPIRIN 81 MG: 81 TABLET, COATED ORAL at 14:58

## 2022-06-25 RX ADMIN — SODIUM CHLORIDE: 9 INJECTION, SOLUTION INTRAVENOUS at 15:00

## 2022-06-25 RX ADMIN — INSULIN LISPRO 1 UNITS: 100 INJECTION, SOLUTION INTRAVENOUS; SUBCUTANEOUS at 20:31

## 2022-06-25 RX ADMIN — VANCOMYCIN HYDROCHLORIDE 1000 MG: 10 INJECTION, POWDER, LYOPHILIZED, FOR SOLUTION INTRAVENOUS at 10:32

## 2022-06-25 RX ADMIN — DIPHENHYDRAMINE HYDROCHLORIDE 12.5 MG: 50 INJECTION, SOLUTION INTRAMUSCULAR; INTRAVENOUS at 03:50

## 2022-06-25 RX ADMIN — ANTACID TABLETS 500 MG: 500 TABLET, CHEWABLE ORAL at 09:32

## 2022-06-25 RX ADMIN — INSULIN LISPRO 4 UNITS: 100 INJECTION, SOLUTION INTRAVENOUS; SUBCUTANEOUS at 18:01

## 2022-06-25 RX ADMIN — GUAIFENESIN AND DEXTROMETHORPHAN HYDROBROMIDE 1 TABLET: 600; 30 TABLET, EXTENDED RELEASE ORAL at 10:31

## 2022-06-25 RX ADMIN — ONDANSETRON 4 MG: 2 INJECTION INTRAMUSCULAR; INTRAVENOUS at 05:59

## 2022-06-25 RX ADMIN — PREGABALIN 75 MG: 75 CAPSULE ORAL at 20:31

## 2022-06-25 ASSESSMENT — PAIN SCALES - GENERAL
PAINLEVEL_OUTOF10: 0
PAINLEVEL_OUTOF10: 3

## 2022-06-25 ASSESSMENT — PAIN DESCRIPTION - DESCRIPTORS: DESCRIPTORS: ACHING;PRESSURE

## 2022-06-25 ASSESSMENT — PAIN DESCRIPTION - ONSET: ONSET: ON-GOING

## 2022-06-25 ASSESSMENT — PAIN DESCRIPTION - ORIENTATION: ORIENTATION: LEFT;LOWER

## 2022-06-25 ASSESSMENT — PAIN - FUNCTIONAL ASSESSMENT: PAIN_FUNCTIONAL_ASSESSMENT: ACTIVITIES ARE NOT PREVENTED

## 2022-06-25 ASSESSMENT — PAIN DESCRIPTION - LOCATION: LOCATION: LEG

## 2022-06-25 ASSESSMENT — PAIN DESCRIPTION - FREQUENCY: FREQUENCY: INTERMITTENT

## 2022-06-25 ASSESSMENT — PAIN DESCRIPTION - PAIN TYPE: TYPE: ACUTE PAIN

## 2022-06-25 NOTE — PROGRESS NOTES
4 Eyes Skin Assessment     NAME:  Jumana Rene  YOB: 1950  MEDICAL RECORD NUMBER:  [id number]    The patient is being assess for  Admission    I agree that 2 RN's have performed a thorough Head to Toe Skin Assessment on the patient. ALL assessment sites listed below have been assessed. Areas assessed by both nurses:    Head, Face, Ears, Shoulders, Back, Chest, Arms, Elbows, Hands, Sacrum. Buttock, Coccyx, Ischium and Legs. Feet and Heels        Does the Patient have a Wound?  Yes see doc flow sheets       Eddie Prevention initiated:  No   Wound Care Orders initiated:  No    Pressure Injury (Stage 3,4, Unstageable, DTI, NWPT, and Complex wounds) if present place referral/consult order under [de-identified] No    New and Established Ostomies if present place consult order under : No      Nurse 1 eSignature: Electronically signed by Caleb Shaikh RN on 6/25/22 at 12:41 AM EDT    **SHARE this note so that the co-signing nurse is able to place an eSignature**    Nurse 2 eSignature: {Esignature:[id number]}

## 2022-06-25 NOTE — CONSULTS
[facility]    [facility]              [phone]                       Plan :     Check urinalysis  Check protein creatinine  Renal ultrasound done in April did not show any acute etiology. We will also check some basic labs including KRYSTAL SPEP UPEP free light chains. Patient's electrolytes are reasonably stable at this time no need for any bicarb supplementation. Will also follow with the urine activity. We will try to get records from with see that however creatinine has been trending as an outpatient. Check complements HIV and hepatitis serologies as     Assessment :     Acute on chronic kidney disease/chronic kidney disease:  Not a very clear etiology due to somewhat paucity of the data. Patient was on [facility]in [facility] in the past and has been treated for his malignancy. [facility] chronic kidney disease couple of months prior to this hospital admission patient does have underlying diabetes as well along with chronic diuretic use off and on        [facility] would like to thank Fritz Elkins MD   for opportunity to serve this patient      Please call with questions at-   24 Hrs Answering service [phone] or  7 am- 5 pm via Perfect serve or cell phone  Ochoa Rosenberg MD          CC/reason for consult :     Acute/chronic kidney     HPI :     Jen Gloria is a 66 y.o. 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 At home he started having issues with worsening lower extremity edema and swelling.   Onset was subtle progressively got worse associated with significant swelling and redness nothing was making it better or worse. Emergency he was found to have significantly elevated creatinine compared to his old baseline that we have in our system in [facility]. Given the patient's elevated creatinine nephrology is been consulted to assist with     Interval History:     -Feels okay    ROS:     Seen with-in the room    positives in bold   Constitutional:  fever, chills, weakness, weight change, fatigue  Skin:  rash, pruritus, hair loss, bruising, dry skin, petechiae  Head, Face, Neck   headaches, swelling,  cervical adenopathy  Respiratory: shortness of breath, cough, or wheezing  Cardiovascular: chest pain, palpitations, dizzy, edema  Gastrointestinal: nausea, vomiting, diarrhea, constipation,belly pain    Yellow skin, blood in stool  Musculoskeletal:  back pain, muscle weakness, gait problems,       joint pain or swelling. Genitourinary:  dysuria, poor urine flow, flank pain, blood in urine  Neurologic:  vertigo, TIA'S, syncope, seizures, focal weakness  Psychosocial:  insomnia, anxiety, or depression. Additional positive findings:                          All other remaining systems are negative. PMH/PSH/SH/Family History:     Past Medical History:   Diagnosis Date    Arthritis     left knee and hands bilateral    Chondroblastic osteosarcoma ([address] 09/29/2014    Left distal tibia    Diabetes mellitus ([address]     type 2 1999    Hyperlipidemia     Unspecified cerebral artery occlusion with cerebral infarction 01/01/2008       Past Surgical History:   Procedure Laterality Date    ABDOMEN SURGERY      CHOLECYSTECTOMY      CHOLECYSTECTOMY  1977    Laparoscopic    COLONOSCOPY      COLONOSCOPY      X3.  Last one February 2014    ENDOSCOPY, COLON, DIAGNOSTIC      Upper GI prior to Cholecystectomy    FOREIGN BODY REMOVAL      LEG AMPUTATION BELOW KNEE Left 12/08/14    LEG SURGERY Left 8/18/14    biopsy left lower leg    LIPOMA RESECTION  2013    OTHER SURGICAL HISTORY Left 10/22/2014    LEFT SUBCLAVIAN PORT REMOVAL              OTHER SURGICAL HISTORY Right 10/27/14    INSERTION OF SINGLE LUMEN PORT A CATH    TONSILLECTOMY      TONSILLECTOMY      childhood    TUNNELED VENOUS PORT PLACEMENT Right     Power Port       Social History     Socioeconomic History    Marital status: Single     Spouse name: Not on file    Number of children: Not on file    Years of education: Not on file    Highest education level: Not on file   Occupational History    Not on file   Tobacco Use    Smoking status: Former Smoker     Packs/day: 1.00     Years: 12.00     Pack years: 12.00     Quit date: 1982     Years since quittin.5    Smokeless tobacco: Not on file    Tobacco comment: quit    Substance and Sexual Activity    Alcohol use: Yes     Comment: occasional    Drug use: No    Sexual activity: Not Currently   Other Topics Concern    Not on file   Social History Narrative    ** Merged History Encounter **          Social Determinants of Health     Financial Resource Strain:     Difficulty of Paying Living Expenses: Not on file   Food Insecurity:     Worried About [address] in the Last Year: Not on file    [address] in the Last Year: Not on file   Transportation Needs:     Lack of Transportation (Medical): Not on file    Lack of Transportation (Non-Medical):  Not on file   Physical Activity:     Days of Exercise per Week: Not on file    Minutes of Exercise per Session: Not on file   Stress:     Feeling of Stress : Not on file   Social Connections:     Frequency of Communication with Friends and Family: Not on file    Frequency of Social Gatherings with Friends and Family: Not on file    Attends Yarsanism Services: Not on file    Active Member of Clubs or Organizations: Not on file    Attends Club or Organization Meetings: Not on file    Marital Status: Not on file   Intimate Partner Violence:     Fear of Current or Ex-Partner: Not on file    Emotionally Abused: Not on file    Physically Abused: Not on file    Sexually Abused: Not on file   Housing Stability:     Unable to Pay for Housing in the Last Year: Not on file    Number of Places Lived in the Last Year: Not on file    Unstable Housing in the Last Year: Not on file           Problem Relation Age of Onset    Emphysema Mother    [facility] Other Brother           Medication:      vancomycin  1,000 mg IntraVENous Once    sodium chloride flush  5-40 mL IntraVENous 2 times per day    enoxaparin  40 mg SubCUTAneous Daily    vancomycin (VANCOCIN) intermittent dosing (placeholder)   Other RX Placeholder    insulin lispro  0-12 Units SubCUTAneous TID WC    insulin lispro  0-6 Units SubCUTAneous Nightly     dextromethorphan-guaiFENesin, calcium carbonate, sodium chloride flush, sodium chloride, ondansetron **OR** ondansetron, polyethylene glycol, acetaminophen **OR** acetaminophen, glucose, dextrose bolus **OR** dextrose bolus, glucagon (rDNA), dextrose, HYDROmorphone, oxyCODONE-acetaminophen       Vitals :     Vitals:    06/25/22 0751   BP: (!) 148/88   Pulse: 65   Resp: 22   Temp: 98.3 °F (36.8 °C)   SpO2: 95%       I & O :       Intake/Output Summary (Last 24 hours) at 6/25/2022 1118  Last data filed at 6/25/2022 0802  Gross per 24 hour   Intake 914.53 ml   Output 750 ml   Net 164.53 ml        Physical Examination :     General appearance: Anxious- no, distressed- no, in good spirits- yes  HEENT: Lips- normal, teeth- ok , oral mucosa- moist  Neck : Mass- no, appears symmetrical, JVD- not visible  Respiratory: Respiratory effort-not labored, wheeze- no, crackles - no  Cardiovascular:  Bcnhcuvgpvl-K1-G1, Edema trace  Abdomen: visible mass- no, distention- no, scar- no, tenderness- no                            hepatosplenomegaly-  no  Musculoskeletal:  clubbing no,cyanosis- no , digital ischemia- no                           muscle strength- grossly normal , tone - grossly normal  Skin: rashes- no , ulcers- no, induration- no, tightening - no  Psychiatric:  Judgement and insight- normal           AAO X 3     LABS:     Recent Labs     06/24/22  1729 06/25/22  0612   WBC 6.3 5.2   HGB 12.2* 12.2*   HCT 36.2* 36.9*    241     Recent Labs     06/24/22  1729 06/25/22  0612    142   K 4.3 4.5    106   CO2 26 24   BUN 39* 43*   CREATININE 2.5* 2.6*   GLUCOSE 82 116*

## 2022-06-25 NOTE — PROGRESS NOTES
Speech Language Pathology    Received therapy order. Spoke with RN. Attempted to see pt for evaluation. Pt currently having phone conversation, requesting therapist return later. Will re-attempt later today vs 6/26, schedule permitting. [place], [place], [address] [place] [address]  Pg.  # [phone]

## 2022-06-25 NOTE — CONSULTS
Clinical Pharmacy Progress Note  Medication History      Asked to verify home medications by Dr. Sol Smith. List of of current medications patient is taking is complete. Home Medication list in EPIC updated to reflect changes noted below. Source of information: RN interview with patient, [facility] notes, [facility] (pt fills meds at [place])     Changes made to home medication list:   Medications removed (no longer taking):  · Amoxicillin  · Diphenhydramine  · Furosemide  · Gabapentin  · Glipizide  · Simvastatin  · Toujeo insulin     Medications added:   · Cephalexin - started 7 day course on 6/20/22  · Lantus insulin  · Melatonin prn  · Tylenol PM prn     Medication doses / instructions adjusted:   · MEtformin     Please call with questions--  Thanks--  Toy FranksD, [facility], [address] ([facility])   6/25/2022 10:15 AM      Current Outpatient Medications   Medication Instructions    amoxicillin (AMOXIL) 500 MG capsule Take 4 tablets all at once, one hour prior to dental procedure.     aspirin 81 mg, Oral, DAILY    atorvastatin (LIPITOR) 20 mg, Oral, DAILY    diphenhydrAMINE-APAP, sleep, (TYLENOL PM EXTRA STRENGTH)  MG tablet 1 tablet, Oral, NIGHTLY PRN    insulin glargine (LANTUS;BASAGLAR) 19 Units, SubCUTAneous, DAILY    melatonin 10 mg, Oral, NIGHTLY PRN    metFORMIN (GLUCOPHAGE) 500 mg, Oral, 2 TIMES DAILY WITH MEALS    pregabalin (LYRICA) 150 mg, Oral, 2 TIMES DAILY    vitamin D (CHOLECALCIFEROL) 1,000 Units, Oral, DAILY

## 2022-06-25 NOTE — PROGRESS NOTES
Patient admitted to 3310 from ED. A&Ox4. C/o mild pain in left stump. RA, sat 97%. No c/o SOB or nausea. Lungs clear. Left AC PIV intact and flushed. Skin intact with exception of LLE. LLE stump noted to have redness/warmth/swelling. Intact scab noted to end of stump. No open areas noted. No needs at this time. Patient resting comfortably in bed. Call light in reach. Bed alarm on. Will continue to monitor.    Electronically signed by Nick Vega RN on 6/25/2022 at 12:40 AM

## 2022-06-25 NOTE — H&P
Hospital Medicine History & Physical      PCP: Swetha Alanis    Date of Admission: 6/24/2022    Date of Service: Pt seen/examined on 6/24/2022 and Admitted to Inpatient with expected LOS greater than two midnights due to medical therapy. Chief Complaint:  Left stump swelling and erythema      History Of Present Illness: The patient is a 66 y.o. male with past medical history of chondroblastic osteosarcoma of lower extremity, s/p BKA, metastatic to lung and head and neck, used to be on Keytruda, currently being managed by [facility] who presents with worsening edema of left lower extremity and erythema. Patient was started as outpatient on Keflex with minimal improvement. Patient was unable to use his prosthetic leg and could not move around at home,: EMS. Denies any fevers or chills at home. In the ER x-rays were obtained and were negative for acute osseous pathology. Past Medical History:        Diagnosis Date    Arthritis     Arthritis     left knee and hands bilateral    Cancer ([address] 2014    osteosarcoma    Cancer ([address]     Osteosarcoma Left Leg    Chondroblastic osteosarcoma ([address] 9/29/2014    Left distal tibia    Diabetes mellitus ([address]     Diabetes mellitus ([address]     type 2 1999    Hyperlipidemia     Osteosarcoma of bone ([address]     Unspecified cerebral artery occlusion with cerebral infarction 2008       Past Surgical History:        Procedure Laterality Date    ABDOMINAL SURGERY      CHOLECYSTECTOMY      CHOLECYSTECTOMY  1977    Laparoscopic    COLONOSCOPY      COLONOSCOPY      X3.  Last one February 2014    ENDOSCOPY, COLON, DIAGNOSTIC      Upper GI prior to Cholecystectomy    FOREIGN BODY REMOVAL      LEG AMPUTATION BELOW KNEE Left 12/08/14    LEG SURGERY Left 8/18/14    biopsy left lower leg    LIPOMA RESECTION  2013    OTHER SURGICAL HISTORY Left 10/22/2014    LEFT SUBCLAVIAN PORT REMOVAL              OTHER SURGICAL HISTORY Right 10/27/14    INSERTION OF SINGLE LUMEN PORT A CATH    TONSILLECTOMY      TONSILLECTOMY      childhood    TUNNELED VENOUS PORT PLACEMENT Right     Power Port       Medications Prior to Admission:    Prior to Admission medications    Medication Sig Start Date End Date Taking? Authorizing Provider   atorvastatin (LIPITOR) 20 MG tablet Take 20 mg by mouth daily    Historical Provider, MD   furosemide (LASIX) 20 MG tablet Take 20 mg by mouth 2 times daily Indications: for 7 days, started 8/6/19  Patient not taking: Reported on 6/24/2022    Historical Provider, MD   Insulin Glargine (TOUJEO SOLOSTAR SC) Inject 19 Units into the skin daily     Historical Provider, MD   pregabalin (LYRICA) 150 MG capsule Take 150 mg by mouth 2 times daily. Historical Provider, MD   aspirin 81 MG tablet Take 81 mg by mouth daily    Historical Provider, MD   vitamin D (CHOLECALCIFEROL) 1000 UNIT TABS tablet Take 1,000 Units by mouth daily Indications: 2 tablets daily    Historical Provider, MD   diphenhydrAMINE (BENADRYL) 50 MG capsule Take 50 mg by mouth nightly as needed for Itching    Historical Provider, MD   amoxicillin (AMOXIL) 500 MG capsule Take 4 tablets all at once, one hour prior to dental procedure. 6/21/17   Nancy Bobby MD   metFORMIN (GLUCOPHAGE) 500 MG tablet Take 850 mg by mouth 2 times daily (with meals)     Historical Provider, MD   gabapentin (NEURONTIN) 100 MG capsule Take 100 mg by mouth 2 times daily. Historical Provider, MD   glipiZIDE (GLUCOTROL) 10 MG tablet Take 10 mg by mouth 2 times daily (before meals). Historical Provider, MD   simvastatin (ZOCOR) 40 MG tablet Take 20 mg by mouth nightly. Historical Provider, MD       Allergies:  Compazine [prochlorperazine maleate]    Social History:  The patient currently lives at home    TOBACCO:   reports that he quit smoking about 40 years ago. He has a 12.00 pack-year smoking history. He does not have any smokeless tobacco history on file.   ETOH:   reports current alcohol use.      Family History:  Reviewed in detail and  Positive as follows:        Problem Relation Age of Onset    Emphysema Mother     Other Brother        REVIEW OF SYSTEMS:   Positive and negative as noted in the HPI. All other systems reviewed and negative. PHYSICAL EXAM:    /74   Pulse 58   Temp 97.6 °F (36.4 °C) (Oral)   Resp 18   Ht 5' 11\" (1.803 m)   Wt 201 lb 11.5 oz (91.5 kg)   SpO2 97%   BMI 28.13 kg/m²     General appearance: No apparent distress appears stated age and cooperative. HEENT Normal cephalic, atraumatic without obvious deformity. Conjunctivae/corneas clear. Neck: Supple, No jugular venous distention/bruits. Trachea midline without thyromegaly or adenopathy with full range of motion. Lungs: Clear to auscultation, bilaterally without Rales/Wheezes/Rhonchi with good respiratory effort. Heart: Regular rate and rhythm with Normal S1/S2 without murmurs, rubs or gallops, point of maximum impulse non-displaced  Abdomen: Soft, non-tender or non-distended without rigidity or guarding and positive bowel sounds all four quadrants. Extremities: No clubbing, cyanosis, edema of BKA site on the left with erythema, some crusting skin over prior surgical scar, no active drainage  Skin: as above  Neurologic: Alert and oriented X 3, grossly non-focal.  Mental status: Alert, oriented, thought content appropriate. Capillary refill is brisk  Peripheral pulses 2+    LE xray:  Status post below-knee amputation. No acute fracture or malalignment. No osseous erosions identified. No significant joint effusion seen. Moderate to severe osteoarthritis in the knee. Vascular calcifications and scattered soft tissue calcifications. There is soft tissue swelling diffusely. No soft tissue gas identified.        CBC   Recent Labs     06/24/22  1729   WBC 6.3   HGB 12.2*   HCT 36.2*         RENAL  Recent Labs     06/24/22  1729      K 4.3      CO2 26   BUN 39*   CREATININE 2.5* LFT'S  Recent Labs     06/24/22  1729   AST 14*   ALT 14   BILIDIR <0.2   BILITOT 0.4   ALKPHOS 70     COAG  No results for input(s): INR in the last 72 hours. CARDIAC ENZYMES  No results for input(s): CKTOTAL, CKMB, CKMBINDEX, TROPONINI in the last 72 hours. U/A:    Lab Results   Component Value Date    COLORU Yellow 12/01/2014    WBCUA 0-2 10/15/2014    RBCUA 0-2 10/15/2014    CLARITYU Clear 12/01/2014    SPECGRAV 1.015 12/01/2014    LEUKOCYTESUR Negative 12/01/2014    BLOODU Negative 12/01/2014    GLUCOSEU Negative 12/01/2014       ABG  No results found for: VDY6XPO, BEART, J1FWWTKH, PHART, THGBART, NYD8XNJ, PO2ART, Micaela Campersingel [address] Problems    Diagnosis Date Noted    Cellulitis [L03.90] 06/24/2022     Priority: Medium         ASSESSMENT/PLAN:    Left lower extremity cellulitis:  Failed Keflex as outpatient  Will start on vancomycin and consult infectious disease  Keep leg elevation  Monitor skin appearance closely  Consider more advanced imaging to assess bone pathology if fails to improve    History of left distal tibia osteosarcoma, metastatic to lung and head and neck, status post Keytruda  Patient is [facility]  Not in any active treatment    Progress CKD stage IV:  Creatinine 2.5 today, will give gentle fluid. Repeat creatinine in the morning. Patient follows with nephrology through [address] [facility]. DM type II:  Consult pharmacy to reconcile medications, reorder as appropriate    DVT Prophylaxis: Lovenox  Diet: ADULT DIET; Regular; Mildly Thick (Nectar)  Code Status: Full Code  PT/OT Eval Status: Ordered    Dispo -inpatient       Jina Cole MD    Thank you Lexus Lott for the opportunity to be involved in this patient's care. If you have any questions or concerns please feel free to contact me at [phone].

## 2022-06-25 NOTE — PLAN OF CARE
Problem: Discharge Planning  Goal: Discharge to home or other facility with appropriate resources  Outcome: Progressing     Problem: ABCDS Injury Assessment  Goal: Absence of physical injury  Outcome: Progressing     Problem: Pain  Goal: Verbalizes/displays adequate comfort level or baseline comfort level  Outcome: Progressing     Problem: Safety - Adult  Goal: Free from fall injury  Outcome: Progressing

## 2022-06-25 NOTE — PROGRESS NOTES
Hospitalist Progress Note      PCP: Chaitanya Henry    Date of Admission: 2022    Chief Complaint on Admission: leg cellulitis    Pt Seen/Examined and Chart Reviewed. Admitting dx leg cellulitis    SUBJECTIVE/OBJECTIVE:   66 y.o. male with past history of chondroblastic osteosarcoma of lower extremity, s/p BKA, metastatic to lung and head and neck, used to be on Keytruda, currently being managed by [facility] admitted with left stump cellulitis. Swelling appears improved, less erythema. Reports good oral intake. Urinating small amounts. Allergies  Compazine [prochlorperazine maleate]    Medications      Scheduled Meds:   cefTRIAXone (ROCEPHIN) IV  1,000 mg IntraVENous Q24H    sodium chloride flush  5-40 mL IntraVENous 2 times per day    enoxaparin  40 mg SubCUTAneous Daily    vancomycin (VANCOCIN) intermittent dosing (placeholder)   Other RX Placeholder    insulin lispro  0-12 Units SubCUTAneous TID WC    insulin lispro  0-6 Units SubCUTAneous Nightly       Infusions:   sodium chloride      sodium chloride Stopped (22 2336)    dextrose         PRN Meds:  dextromethorphan-guaiFENesin, calcium carbonate, sodium chloride flush, sodium chloride, ondansetron **OR** ondansetron, polyethylene glycol, acetaminophen **OR** acetaminophen, glucose, dextrose bolus **OR** dextrose bolus, glucagon (rDNA), dextrose, HYDROmorphone, oxyCODONE-acetaminophen    Vitals    TEMPERATURE:  Current - Temp: 98.3 °F (36.8 °C);  Max - Temp  Av.8 °F (36.6 °C)  Min: 97.6 °F (36.4 °C)  Max: 98.3 °F (36.8 °C)  RESPIRATIONS RANGE: Resp  Av.2  Min: 18  Max: 22  PULSE RANGE: Pulse  Av  Min: 58  Max: 65  BLOOD PRESSURE RANGE:  Systolic (87WHS), FOU:049 , Min:108 , HUF:365   ; Diastolic (94MEI), YUA:69, Min:68, Max:88    PULSE OXIMETRY RANGE: SpO2  Av.8 %  Min: 95 %  Max: 100 %  24HR INTAKE/OUTPUT:      Intake/Output Summary (Last 24 hours) at 2022 1443  Last data filed at 2022 1203  Gross per 24 hour   Intake 914.53 ml   Output 1000 ml   Net -85.47 ml       Exam:      General appearance: No apparent distress, appears stated age and cooperative. Lungs: Coarse breath sound bilaterally  Heart: Regular rate and rhythm with Normal S1/S2 without  murmurs, rubs or gallops, point of maximum impulse non-displaced  Abdomen: Soft, non-tender or non-distended without rigidity or guarding and positive bowel sounds all four quadrants. Extremities: Status post left BKA, edema improved  Skin: Improved with erythema of left lower stump, skin crusting present, no active drainage  Neurologic: Alert and oriented X 3, grossly non-focal.  Mental status: Alert, oriented, thought content appropriate. Data    Recent Labs     06/24/22  1729 06/25/22  0612   WBC 6.3 5.2   HGB 12.2* 12.2*   HCT 36.2* 36.9*    241      Recent Labs     06/24/22  1729 06/25/22  0612    142   K 4.3 4.5    106   CO2 26 24   BUN 39* 43*   CREATININE 2.5* 2.6*     Recent Labs     06/24/22  1729   AST 14*   ALT 14   BILIDIR <0.2   BILITOT 0.4   ALKPHOS 70     No results for input(s): INR in the last 72 hours. No results for input(s): CKTOTAL, CKMB, CKMBINDEX, TROPONINI in the last 72 hours. Consults:     IP CONSULT TO HOSPITALIST  IP CONSULT TO PHARMACY  PHARMACY TO DOSE VANCOMYCIN  IP CONSULT TO PHARMACY  IP CONSULT TO INFECTIOUS DISEASES  IP CONSULT TO [address] [place] [address]    Diagnosis Date Noted    Cellulitis [L03.90] 06/24/2022     Priority: Medium         ASSESSMENT AND PLAN      Left lower extremity cellulitis:  Failed Keflex as outpatient  Continue leg elevation  Appreciate ID consultation  Continue IV vancomycin and ceftriaxone     History of left distal tibia osteosarcoma, metastatic to lung and head and neck, status post Keytruda  Patient is [facility]  Not in any active treatment     Progress CKD stage IV:  Patient follows with nephrology through [place] system.    Creatinine has been worsening  Nephrology consulted  Continue fluids     DM type II:  Consult pharmacy to reconcile medications, reorder as appropriate        DVT Prophylaxis: Heparin  Diet: ADULT DIET;  Regular; Mildly Thick (Nectar)  Code Status: Full Code    PT/OT Eval Status: Ordered    Dispo -inpatient    Jeffrey Pandey MD

## 2022-06-25 NOTE — PROGRESS NOTES
Physician Progress Note      Bradley Julien  CSN #:                  [id number]  :                       1950  ADMIT DATE:       2022 3:08 PM  [address] [place] DATE:  RESPONDING  PROVIDER #:        Bill Conklin MD          QUERY TEXT:    Pt admitted with left lower extremity cellulitis. Pt noted to have DM type II. If possible, please document in progress notes and discharge summary the   relationship, if any, between cellulitis and DM. The medical record reflects the following:  Risk Factors: 77 yo S/P left BKA w/ hx of DM II, wears prosthetic  Clinical Indicators: Per H&P: Left lower extremity cellulitis, Failed Keflex   as outpatient. DM type II. A1C  7.3. Glucose 82 - 116 on admission. Treatment: IV Vanc, ID consult  Options provided:  -- Left lower extremity cellulitis associated with Diabetes  -- Left lower extremity cellulitis unrelated to Diabetes  -- Other - I will add my own diagnosis  -- Disagree - Not applicable / Not valid  -- Disagree - Clinically unable to determine / Unknown  -- Refer to Clinical Documentation Reviewer    PROVIDER RESPONSE TEXT:    Left lower extremity cellulitis associated with Diabetes.     Query created by: Mihaela Calderon on 2022 10:28 AM      Electronically signed by:  Bill Conklin MD 2022 3:24 PM

## 2022-06-25 NOTE — PROGRESS NOTES
Speech Language Pathology  Facility/Department: [facility] SWALLOW EVALUATION  & Treatment note    NAME: Kendal Henderson  :   MRN: [id number]    ADMISSION DATE: 2022  ADMITTING DIAGNOSIS: has Chondroblastic osteosarcoma ([facility] [address] Micturition syncope; Fever and chills; Diabetes ([facility] [address] Left BKA; Port-A-Cath in place; and Cellulitis on their problem list.  ONSET DATE: 2022    Recent Chest Xray/CT of Chest: none completed this admission    Date of Eval: 2022  Evaluating Therapist: DAVID Coombs    Current Diet level:  Current Diet : Regular      Primary Complaint  Patient Complaint: Reports swallow function seems stable, though has some complaint of reflux    Pain:  Pain Assessment  Pain Assessment: None - Denies Pain  Pain Level: 0  Patient's Stated Pain Goal: 0 - No pain  Pain Location: Leg  Pain Orientation: Left,Lower  Pain Descriptors: Aching,Pressure  Functional Pain Assessment: Activities are not prevented  Pain Type: Acute pain  Pain Frequency: Intermittent  Pain Onset: On-going  Non-Pharmaceutical Pain Intervention(s): Rest  Response to Pain Intervention: Other (comment) (sleeping)  Side Effects: No reported side effects  Pain Assessment in Advanced Dementia (PAINAD)  Non-Pharmaceutical Pain Intervention(s): Rest  Response to Pain Intervention: Other (comment) (sleeping)  Side Effects: No reported side effects  Faces, Legs, Activity, Cry, and Consolability (FLACC)  Non-Pharmaceutical Pain Intervention(s): Rest  Response to Pain Intervention: Other (comment) (sleeping)  Side Effects: No reported side effects    Prior Dysphagia History:  Patient with prior h/o dysphagia in the setting of left true vocal fold paralysis as well as a tracheoesophageal fistula. Currently on mildly thick liquids and regular texture solids.  Patient reports having had FEES completed at [place] previously, and mention is made in EMR of a prior MBS, however this writer experienced difficulty locating/accessing these records. Pt reports he thickens most of his liquids at home with the exception of carbonated beverages, and does not use straws. He also reports recommendations from the [place] to utilize chin tuck maneuver with thin liquids. Reason for Referral  Doug Peoples was referred for a bedside swallow evaluation to assess the efficiency of his swallow function, identify signs and symptoms of aspiration and make recommendations regarding safe dietary consistencies, effective compensatory strategies, and safe eating environment. Impression  Dysphagia Diagnosis: Concerns for esophageal stage dysphagia;Mild pharyngeal stage dysphagia  Dysphagia Impression : Patient presents with previously identified pharyngoesophageal dysphagia in the setting of left true vocal fold paralysis and unrepaired tracheoesophageal fistula (reportedly not a candidate for surgical repair). Oral motor exam appears grossly WFL. Bedside, seated upright, assessed tolerance baseline diet textures (nectar thickened liquids via cup, regular solids, thin via chin tuck). Pt with positive oral acceptance, timely mastication, positive swallow movement, good oral clearance. No overt signs of aspiration for solids or thickened liquid. Initial attempt at thins with chin tuck elicited cough, but not on subsequent trial. Unfortunately, this writer had difficulty locating/accessing prior instrumental swallow procedure reports (FEES/MBS) in EMR. Recommend continue patient's reported baseline diet of mildly thick (nectar) liquids and regular texture solids, utilizing general aspiration precautions, and no straws. Will monitor need for further instrumental assessment. Dysphagia Outcome Severity Scale: Level 5: Mild dysphagia- Distant supervision.  May need one diet consistency restricted     Treatment Plan  Requires SLP Intervention: Yes  Duration of Treatment: 1-2 wks or LOS  D/C Recommendations: Ongoing speech therapy is recommended during this hospitalization       Recommended Diet and Intervention  Regular solids, mildly thick (nectar) liquids, no straws  Recommended Form of Meds: PO  Recommendations: Dysphagia treatment  Therapeutic Interventions: Diet tolerance monitoring;Patient/Family education;Oral care    Compensatory Swallowing Strategies  Compensatory Swallowing Strategies : Upright as possible for all oral intake; No straws;Eat/Feed slowly; Alternate solids and liquids;Small bites/sips    Treatment/Goals  Short-term Goals  Timeframe for Short-term Goals: 1-2 wks or LOS  Goal 1: Patient will tolerate least restrictive diet without over signs of aspiration or associated decline in respiratory status. Goal 2: Patient/caregiver will demonstrate understanding of swallowing concerns/recommendations. 6/25: Patient educated regarding dysphagia/aspiration concerns, thickener usage, oral hygiene completion/rationale, as well as general aspiration precautions, diet recommendations, and monitoring for possible repeat instrumental assessment. Patient stated comprehension, suspect may need reinforcement. Cont goal    General  Chart Reviewed: Yes  Comments: Per admitting H&P (06/24/2022): 'The patient is a 66 y.o. 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 Patient was started as outpatient on Keflex with minimal improvement. Patient was unable to use his prosthetic leg and could not move around at home,: EMS. Denies any fevers or chills at home. In the ER x-rays were obtained and were negative for acute osseous pathology. '  Subjective  Subjective: Received pt awake, alert, very pleasant. Resting in bed, on room air. Behavior/Cognition: Alert; Cooperative;Pleasant mood  Respiratory Status: Room air  O2 Device: None (Room air)  Communication Observation: Functional  Follows Directions: Simple  Dentition: Adequate  Patient Positioning: Upright in bed  Baseline Vocal Quality: Hoarse (h/o L VF paralysis)  Volitional Cough: Strong    Vision/Hearing  Vision  Vision: Within Functional Limits  Hearing  Hearing: Within functional limits    Oral Motor Deficits  Oral/Motor  Oral Hygiene: Clean;Moist    Oral Phase Dysfunction  WFL     Indicators of Pharyngeal Phase Dysfunction  Cough with thin liquids    Prognosis  Individuals consulted  Consulted and agree with results and recommendations: Patient;RN    Education  Patient Education: Educated pt to purpose of visit, role of SLP. Patient Education Response: Verbalizes understanding  Safety Devices in place: Yes  Type of devices: All fall risk precautions in place; Bed alarm in place;Call light within reach       Therapy Time  SLP Individual Minutes  Time In: 4051  Time Out: 1600  Minutes: 30     SLP Total Treatment Time  Timed Code Treatment Minutes: 0 Minutes  Total Treatment Time: 30    Plan  Diet Recommendations:     - regular texture solids, mildly thick (nectar) liquids, no straws   - general aspiration precautions  - oral hygiene x2 daily    Discharge Plan:  TBD  Discussed with RNLety, prior to visit. Needs within reach. Electronically Signed by:  Jayde Hyatt M.A., Christina Duke   Speech-Language Pathologist  Pager #[phone]    This document will serve as a discharge summary if pt discharges before next treatment.

## 2022-06-25 NOTE — PROGRESS NOTES
Clinical Pharmacy Progress Note    Vancomycin - Management by Pharmacy    Consult Date(s): 06/24/22  Consulting Provider(s): Bishop    Assessment / Plan    LLE BKA site swelling/ SSTI - Vancomycin   Concurrent Antimicrobials: None   Day of Vanc Therapy / Ordered Duration: 1/7   Current Dosing Method: Intermittent Dosing by Levels   Therapeutic Goal: ~ 15 mg/L   Current Dose / Frequency: Intermittent   Plan / Rationale:   o Pt with MILEY (Scr 2.5 mg/dL today, baseline appears to be ~1.4 mg/dL)  o Will dose intermittently based on levels for now.   o Patient was given a LD of 1500 mg (~15 mg/kg) ordered as an ED consult today. o Will not give more vancomycin today and will check a level tomorrow AM.  [place] Will continue to monitor clinical condition and make adjustments to regimen as appropriate. Thank you for consulting Pharmacy! Mary Durbin, PharmD  PGY-1 Pharmacy Resident  [id number]  6/24/2022 8:58 PM        Subjective/Objective: Mr. Vesta Velasquez is a 66 y.o. male with a PMHx significant for diabetes, metastatis chondroblastic osteosarcoma (s/p L BKA), and trachooesopheaal fistula admitted for swelling and pain of left BKA site. Pharmacy has been consulted to dose vancomycin for concern for SSTI. Ht Readings from Last 1 Encounters:   06/24/22 5' 11\" (1.803 m)     Wt Readings from Last 1 Encounters:   06/24/22 201 lb 11.5 oz (91.5 kg)       Current & Prior Antimicrobial Regimen(s):   None    Level(s) / Doses:    Date Dose Level / Type of Level Notes   06/24 1500 mg --    06/25  Random = ? Note: Serum levels collected for AUC-based dosing may be high if collected in close proximity to the dose administered. This is not necessarily indicative of toxicity. Cultures & Sensitivities:    Date Site Micro Susceptibility / Result   None              Labs / Ancillary Data:    Estimated Creatinine Clearance: 30 mL/min (A) (based on SCr of 2.5 mg/dL (H)).     Recent Labs     06/24/22  0268 CREATININE 2.5*   BUN 39*   WBC 6.3       Additional Lab Values / Findings of Note:    Procalcitonin: No results for input(s): PROCAL in the last 72 hours. ,

## 2022-06-25 NOTE — CONSULTS
Infectious Diseases   Consult Note      Reason for Consult:   Cellulitis   Requesting Physician:  Dr. Aissatou Nguyen      Date of Admission: 6/24/2022  Subjective:   CHIEF COMPLAINT:   None given       HPI:    Lamont Pope is a 65yoM with history of DM, metastatic chondroblastic osteosarcoma, TEF, L BKA    ED 6/24/22 - c/o swelling L stump for about 1 week, did not improve with po cephalexin started on 6/21/22. WBC was wnl   Creatinine was 2.5 which is up from baseline. Normal lactic, ESR 39 and CRP 32  Admitted for management of LLE cellulitis and started on vancomycin. He has been consistently afebrile. Current abx:  vanc by level per pharmacy        Past Surgical History:       Diagnosis Date    Arthritis     left knee and hands bilateral    Chondroblastic osteosarcoma ([facility] [address] 09/29/2014    Left distal tibia    Diabetes mellitus ([facility] [address]     type 2 1999    Hyperlipidemia     Unspecified cerebral artery occlusion with cerebral infarction 01/01/2008         Procedure Laterality Date    ABDOMEN SURGERY      CHOLECYSTECTOMY      CHOLECYSTECTOMY  1977    Laparoscopic    COLONOSCOPY      COLONOSCOPY      X3. Last one February 2014    ENDOSCOPY, COLON, DIAGNOSTIC      Upper GI prior to Cholecystectomy    FOREIGN BODY REMOVAL      LEG AMPUTATION BELOW KNEE Left 12/08/14    LEG SURGERY Left 8/18/14    biopsy left lower leg    LIPOMA RESECTION  2013    OTHER SURGICAL HISTORY Left 10/22/2014    LEFT SUBCLAVIAN PORT REMOVAL              OTHER SURGICAL HISTORY Right 10/27/14    INSERTION OF SINGLE LUMEN PORT A CATH    TONSILLECTOMY      TONSILLECTOMY      childhood    TUNNELED VENOUS PORT PLACEMENT Right     Power Port       Social History:    TOBACCO:   reports that he quit smoking about 40 years ago. He has a 12.00 pack-year smoking history. He does not have any smokeless tobacco history on file. ETOH:   reports current alcohol use.   There is no history of illicit drug use or other significant epidemiologic exposures.       Family History:       Problem Relation Age of Onset    Emphysema Mother     Other Brother        Current Medications:    Current Facility-Administered Medications: dextromethorphan-guaiFENesin (MUCINEX DM)  MG per extended release tablet 1 tablet, 1 tablet, Oral, BID PRN  calcium carbonate (TUMS) chewable tablet 500 mg, 500 mg, Oral, TID PRN  vancomycin (VANCOCIN) 1,000 mg in dextrose 5 % 250 mL IVPB, 1,000 mg, IntraVENous, Once  sodium chloride flush 0.9 % injection 5-40 mL, 5-40 mL, IntraVENous, 2 times per day  sodium chloride flush 0.9 % injection 5-40 mL, 5-40 mL, IntraVENous, PRN  0.9 % sodium chloride infusion, , IntraVENous, PRN  enoxaparin (LOVENOX) injection 40 mg, 40 mg, SubCUTAneous, Daily  ondansetron (ZOFRAN-ODT) disintegrating tablet 4 mg, 4 mg, Oral, Q8H PRN **OR** ondansetron (ZOFRAN) injection 4 mg, 4 mg, IntraVENous, Q6H PRN  polyethylene glycol (GLYCOLAX) packet 17 g, 17 g, Oral, Daily PRN  acetaminophen (TYLENOL) tablet 650 mg, 650 mg, Oral, Q6H PRN **OR** acetaminophen (TYLENOL) suppository 650 mg, 650 mg, Rectal, Q6H PRN  vancomycin (VANCOCIN) intermittent dosing (placeholder), , Other, RX Placeholder  0.9 % sodium chloride infusion, , IntraVENous, Continuous  insulin lispro (1 Unit Dial) 0-12 Units, 0-12 Units, SubCUTAneous, TID WC  insulin lispro (1 Unit Dial) 0-6 Units, 0-6 Units, SubCUTAneous, Nightly  glucose chewable tablet 16 g, 4 tablet, Oral, PRN  dextrose bolus 10% 125 mL, 125 mL, IntraVENous, PRN **OR** dextrose bolus 10% 250 mL, 250 mL, IntraVENous, PRN  glucagon (rDNA) injection 1 mg, 1 mg, IntraMUSCular, PRN  dextrose 5 % solution, 100 mL/hr, IntraVENous, PRN  HYDROmorphone (DILAUDID) injection 0.5 mg, 0.5 mg, IntraVENous, Q3H PRN  oxyCODONE-acetaminophen (PERCOCET) 5-325 MG per tablet 1 tablet, 1 tablet, Oral, Q4H PRN  Facility-Administered Medications Ordered in Other Encounters: dexamethasone (DECADRON) injection 10 mg, 10 mg, IntraVENous, PRN      Allergies   Allergen Reactions    Compazine [Prochlorperazine Maleate]      Dizziness        REVIEW OF SYSTEMS:    CONSTITUTIONAL:   Per HPI   EYES:  negative for acute visual disturbance and icterus  HEENT:  negative for acute hearing loss, tinnitus, ear drainage, sinus pressure, nasal congestion, epistaxis and snoring  RESPIRATORY:  No cough, shortness of breath, hemoptysis  CARDIOVASCULAR:  negative for chest pain, palpitations  GASTROINTESTINAL:  negative for nausea, vomiting, diarrhea  GENITOURINARY:  negative for frequency, dysuria  HEMATOLOGIC/LYMPHATIC:  negative for easy bruising, bleeding and lymphadenopathy  ALLERGIC/IMMUNOLOGIC:  negative for recurrent infections, angioedema, anaphylaxis and drug reactions  ENDOCRINE:  negative for weight changes and diabetic symptoms including polyuria, polydipsia and polyphagia  MUSCULOSKELETAL:   Per HPI   NEUROLOGICAL:  negative for headaches, slurred speech, unilateral weakness  PSYCHIATRIC/BEHAVIORAL: negative for hallucinations, behavioral problems, confusion and agitation. Objective:   PHYSICAL EXAM:      VITALS:  BP (!) 148/88   Pulse 65   Temp 98.3 °F (36.8 °C) (Oral)   Resp 22   Ht 5' 11\" (1.803 m)   Wt 201 lb 11.5 oz (91.5 kg)   SpO2 95%   BMI 28.13 kg/m²      24HR INTAKE/OUTPUT:      Intake/Output Summary (Last 24 hours) at 6/25/2022 1010  Last data filed at 6/25/2022 0802  Gross per 24 hour   Intake 914.53 ml   Output 750 ml   Net 164.53 ml     CONSTITUTIONAL:  Awake, alert, cooperative, no apparent distress, and appears stated age  [de-identified]: NCAT, PERRL, EOMI. Sclera white, conjunctiva full. OP with moist mucosal membranes, no thrush, tongue protrudes midline  NECK:  Supple, symmetrical, trachea midline, no adenopathy  LUNGS:  no increased work of breathing   ABDOMEN:  normal bowel sounds, soft, flat, NT   PSYCHIATRIC: Oriented to person place and time. No obvious depression or anxiety.   MUSCULOSKELETAL:   L BKA   Scaling wound along the lateral aspect of scar line  Patchy erythema with calor, tenderness. No fluctuance   SKIN:  normal skin color, texture, turgor and no redness, warmth, or swelling. No palpable nodules or stigmata of embolic phenomenon  NEUROLOGIC: nonfocal exam  ACCESS:   PIV in place       DATA:    Old records have been reviewed    CBC:  Recent Labs     06/24/22  1729 06/25/22  0612   WBC 6.3 5.2   RBC 3.51* 3.55*   HGB 12.2* 12.2*   HCT 36.2* 36.9*    241   .0* 103.9*   MCH 34.6* 34.4*   MCHC 33.6 33.1   RDW 12.5 12.5      BMP:  Recent Labs     06/24/22  1729 06/25/22  0612    142   K 4.3 4.5    106   CO2 26 24   BUN 39* 43*   CREATININE 2.5* 2.6*   CALCIUM 9.0 9.1   GLUCOSE 82 116*        Cultures:   None       Radiology Review:  All pertinent images / reports were reviewed as a part of this visit. XR L t/f 6/24/22 swelling, no bony abnormality      Assessment:     Patient Active Problem List   Diagnosis    Chondroblastic osteosarcoma ([address]    Micturition syncope    Fever and chills    Diabetes ([address]    Left BKA    Port-A-Cath in place    Cellulitis       History of metastatic osteosarcoma   S/p BKA remotely     Cellulitis LLE  No guiding micro data  Failed to improve with po cephalexin     AoCKD     No abx allergies       -continue IV Vanc and ceftriaxone   -anticipate improvement in local soft tissue infection. If it fails to improve, would get MRI to evaluate for deep focus of infection / OM   -close monitoring of renal function on IV vanc   -trend ESR, CRP   -MRSA screen   -elevate the leg    Discussed with patient, questions addressed  D/w RN       Sera Hinton M.D. Thank you for the opportunity to participate in the care of your patient.     Please do not hesitate to contact me:   [phone] office

## 2022-06-26 VITALS
TEMPERATURE: 98.2 F | SYSTOLIC BLOOD PRESSURE: 95 MMHG | DIASTOLIC BLOOD PRESSURE: 51 MMHG | BODY MASS INDEX: 28.24 KG/M2 | HEIGHT: 71 IN | WEIGHT: 201.72 LBS | OXYGEN SATURATION: 97 % | HEART RATE: 58 BPM | RESPIRATION RATE: 18 BRPM

## 2022-06-26 PROBLEM — N18.32 STAGE 3B CHRONIC KIDNEY DISEASE (HCC): Status: ACTIVE | Noted: 2022-06-26

## 2022-06-26 LAB
ANION GAP SERPL CALCULATED.3IONS-SCNC: 10 MMOL/L (ref 3–16)
ANTI-NUCLEAR ANTIBODY (ANA): NEGATIVE
BUN BLDV-MCNC: 45 MG/DL (ref 7–20)
C-REACTIVE PROTEIN: 55.8 MG/L (ref 0–5.1)
CALCIUM SERPL-MCNC: 8.3 MG/DL (ref 8.3–10.6)
CHLORIDE BLD-SCNC: 106 MMOL/L (ref 99–110)
CO2: 22 MMOL/L (ref 21–32)
CREAT SERPL-MCNC: 2.6 MG/DL (ref 0.8–1.3)
GFR AFRICAN AMERICAN: 29
GFR NON-AFRICAN AMERICAN: 24
GLUCOSE BLD-MCNC: 130 MG/DL (ref 70–99)
GLUCOSE BLD-MCNC: 154 MG/DL (ref 70–99)
GLUCOSE BLD-MCNC: 183 MG/DL (ref 70–99)
GLUCOSE BLD-MCNC: 216 MG/DL (ref 70–99)
HIV AG/AB: NORMAL
HIV ANTIGEN: NORMAL
HIV-1 ANTIBODY: NORMAL
HIV-2 AB: NORMAL
PERFORMED ON: ABNORMAL
POTASSIUM REFLEX MAGNESIUM: 4.2 MMOL/L (ref 3.5–5.1)
SEDIMENTATION RATE, ERYTHROCYTE: 34 MM/HR (ref 0–20)
SODIUM BLD-SCNC: 138 MMOL/L (ref 136–145)
VANCOMYCIN RANDOM: 12.9 UG/ML

## 2022-06-26 PROCEDURE — 36415 COLL VENOUS BLD VENIPUNCTURE: CPT

## 2022-06-26 PROCEDURE — 86140 C-REACTIVE PROTEIN: CPT

## 2022-06-26 PROCEDURE — 2580000003 HC RX 258: Performed by: INTERNAL MEDICINE

## 2022-06-26 PROCEDURE — 6360000002 HC RX W HCPCS: Performed by: INTERNAL MEDICINE

## 2022-06-26 PROCEDURE — 80202 ASSAY OF VANCOMYCIN: CPT

## 2022-06-26 PROCEDURE — 80048 BASIC METABOLIC PNL TOTAL CA: CPT

## 2022-06-26 PROCEDURE — 85652 RBC SED RATE AUTOMATED: CPT

## 2022-06-26 PROCEDURE — 6370000000 HC RX 637 (ALT 250 FOR IP): Performed by: INTERNAL MEDICINE

## 2022-06-26 RX ORDER — ENOXAPARIN SODIUM 100 MG/ML
30 INJECTION SUBCUTANEOUS DAILY
Status: DISCONTINUED | OUTPATIENT
Start: 2022-06-27 | End: 2022-06-26 | Stop reason: HOSPADM

## 2022-06-26 RX ORDER — DOXYCYCLINE HYCLATE 100 MG
100 TABLET ORAL 2 TIMES DAILY
Qty: 14 TABLET | Refills: 0 | Status: SHIPPED | OUTPATIENT
Start: 2022-06-26 | End: 2022-07-03

## 2022-06-26 RX ADMIN — INSULIN GLARGINE 10 UNITS: 100 INJECTION, SOLUTION SUBCUTANEOUS at 13:46

## 2022-06-26 RX ADMIN — CEFTRIAXONE 1000 MG: 1 INJECTION, POWDER, FOR SOLUTION INTRAMUSCULAR; INTRAVENOUS at 11:53

## 2022-06-26 RX ADMIN — ASPIRIN 81 MG: 81 TABLET, COATED ORAL at 08:31

## 2022-06-26 RX ADMIN — ATORVASTATIN CALCIUM 20 MG: 20 TABLET, FILM COATED ORAL at 08:31

## 2022-06-26 RX ADMIN — SODIUM CHLORIDE, PRESERVATIVE FREE 10 ML: 5 INJECTION INTRAVENOUS at 08:32

## 2022-06-26 RX ADMIN — VANCOMYCIN HYDROCHLORIDE 1000 MG: 10 INJECTION, POWDER, LYOPHILIZED, FOR SOLUTION INTRAVENOUS at 13:44

## 2022-06-26 RX ADMIN — INSULIN LISPRO 2 UNITS: 100 INJECTION, SOLUTION INTRAVENOUS; SUBCUTANEOUS at 08:35

## 2022-06-26 RX ADMIN — INSULIN LISPRO 4 UNITS: 100 INJECTION, SOLUTION INTRAVENOUS; SUBCUTANEOUS at 11:55

## 2022-06-26 RX ADMIN — PREGABALIN 75 MG: 75 CAPSULE ORAL at 08:31

## 2022-06-26 RX ADMIN — ENOXAPARIN SODIUM 40 MG: 40 INJECTION SUBCUTANEOUS at 08:32

## 2022-06-26 ASSESSMENT — PAIN SCALES - GENERAL
PAINLEVEL_OUTOF10: 0

## 2022-06-26 NOTE — PROGRESS NOTES
Patient remains alert and oriented x4. VSS. RA. IV fluids infusing per order. All medications given without issue. No complaints of pain, discomfort, or nausea. Patient up to chair during shift. Patient able to stand and pivot from bed to chair with standby assistance. Patient discharged and scheduled to be transported home at [address] [place] [address]. Belongings gathered and packed. Bed is locked, in lowest position, with bed alarm on for safety. Call light within reach. No further needs at this time. Will continue to monitor.

## 2022-06-26 NOTE — PROGRESS NOTES
Clinical Pharmacy Progress Note    Vancomycin - Management by Pharmacy    Consult Date(s): 06/24/22  Consulting Provider(s): Bishop    Assessment / Plan:  1)  LLE BKA site cellulitis - Vancomycin   Concurrent Antimicrobials: None   Day of Vanc Therapy / Ordered Duration: 3 of 7   Current Dosing Method: Intermittent Dosing by Levels in setting of CKD (baseline SCr ~ 1.4)  o Therapeutic Goal: Trough ~15 mg/L  o Random level this AM = 12.9 mg/L. Will give 1000mg IV x1 today. o Will check random level in AM tomorrow.  Will continue to monitor clinical condition and make adjustments to regimen as appropriate. Please call with questions--  Thanks--  Jessica Contreras, PharmD, [facility]  [id number] ([facility])   6/26/2022 12:43 PM        Interval update:  SCr still elevated but stable at 2.6. Subjective/Objective:  Brandon Penn is a 66 y.o. male with a PMHx significant for diabetes, metastatis chondroblastic osteosarcoma (s/p L BKA), and trachooesopheaal fistula who is admitted for swelling and pain of left BKA site concerning for cellulitis and MILEY on CKD. Pharmacy is consulted to dose vancomycin     Ht Readings from Last 1 Encounters:   06/24/22 5' 11\" (1.803 m)     Wt Readings from Last 1 Encounters:   06/24/22 201 lb 11.5 oz (91.5 kg)       Current & Prior Antimicrobial Regimen(s):  Vancomycin - Pharmacy to dose   Intermittent dosing (6/24-current)    Date Vancomycin Level Vancomycin Dose   6/24  1500mg   6/25 17.4 mcg/mL 1000mg   6/26 12.9 mcg/mL      Cultures & Sensitivities:    Date Site Micro Susceptibility / Result   6/25 MRSA Nasal PCR negative            Labs / Ancillary Data:    Estimated Creatinine Clearance: 29 mL/min (A) (based on SCr of 2.6 mg/dL (H)).     Recent Labs     06/24/22  1729 06/25/22  0612 06/26/22  1006   CREATININE 2.5* 2.6* 2.6*   BUN 39* 43* 45*   WBC 6.3 5.2  --

## 2022-06-26 NOTE — PROGRESS NOTES
Received phone call from emergency contact, Jenise Christian, she states patient's son has been attempting to call him and unable to reach him. She also states patient can get confused in the hospital sometimes (currently A&O4) and that son just wants to check in on him. This RN helped patient phone his son at this time. Continue to monitor.

## 2022-06-26 NOTE — PLAN OF CARE
Problem: Safety - Adult  Goal: Free from fall injury  Outcome: Progressing  Safety precautions in place, call light within reach.

## 2022-06-26 NOTE — CARE COORDINATION
Needed    LOC at discharge: Not Applicable  LLOYD Completed: Not Indicated    Notification completed in HENS/PAS?:  Not Applicable    IMM Completed:   Not Indicated    Transportation:  Transportation PLAN for discharge: EMS transportation   Mode of Transport: Ambulance stretcher - BLS  Reason for medical transport: Other: L BKA, needs help with stairs  Name of Transport Company: [facility]   Phone: [phone]  Time of Transport: 7:30pm    Transport form completed: Yes    Additional CM Notes: DC home on oral abx, no needs. SW scheduled transport with [facility],  time 7:30pm.    1600: SW updated that home health care order has been placed for pt. SW sent referral to [facility]. The Plan for Transition of Care is related to the following treatment goals of Cellulitis [L03.90]    The Patient and/or patient representative Lizandro Walton and his family were provided with a choice of provider and agrees with the discharge plan Yes    Freedom of choice list was provided with basic dialogue that supports the patient's individualized plan of care/goals and shares the quality data associated with the providers.  Not Indicated    Care Transitions patient: No    OLAF Zuluaga  [facility]  Case Management Department  Ph: [phone]  Fax: [phone]

## 2022-06-26 NOTE — PROGRESS NOTES
[facility] NEPHROLOGY    [facility]              [phone]                       Plan :     UA reviewed  Protein to creatinine ratio low. Renal ultrasound done in April did not show any acute etiology. Follow  KRYSTAL SPEP UPEP free light chains. Patient's electrolytes are reasonably stable at this time no need for any bicarb supplementation. Creatinine stable   Rest of the labs can be looked at as an outpatient. Patient afebrile to get discharged from a Beltran Swetha scenario can be discharged from nephrology standpoint of view with follow-up in the office with Dr. Silva Fuelling if still here tomorrow we will follow with above labs. We will try to get records from with see that however creatinine has been trending as an outpatient. follow complements HIV and hepatitis serologies as well     Assessment :     Acute on chronic kidney disease/chronic kidney disease:  Not a very clear etiology due to somewhat paucity of the data. Patient was on Leopold Lemons in the past and has been treated for his malignancy.   There was an ultrasound done by [facility] use chronic kidney disease couple of months prior to this hospital admission patient does have underlying diabetes as well along with chronic diuretic use off and on        [facility] Nephrology would like to thank Aristides Ramos MD   for opportunity to serve this patient      Please call with questions at-   24 Hrs Answering service [phone] or  7 am- 5 pm via Perfect serve or cell phone  Cheri Harrington MD          CC/reason for consult :     Acute/chronic kidney     HPI :     Tobe Siemens is a 66 y.o. 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 At home he started having issues with worsening lower extremity edema and swelling. Onset was subtle progressively got worse associated with significant swelling and redness nothing was making it better or worse. Emergency he was found to have significantly elevated creatinine compared to his old baseline that we have in our system in [facility]. Given the patient's elevated creatinine nephrology is been consulted to assist with     Interval History:     -Feels okay    ROS:     Seen with-in the room    positives in bold   Constitutional:  fever, chills, weakness, weight change, fatigue  Skin:  rash, pruritus, hair loss, bruising, dry skin, petechiae  Head, Face, Neck   headaches, swelling,  cervical adenopathy  Respiratory: shortness of breath, cough, or wheezing  Cardiovascular: chest pain, palpitations, dizzy, edema  Gastrointestinal: nausea, vomiting, diarrhea, constipation,belly pain    Yellow skin, blood in stool  Musculoskeletal:  back pain, muscle weakness, gait problems,       joint pain or swelling. Genitourinary:  dysuria, poor urine flow, flank pain, blood in urine  Neurologic:  vertigo, TIA'S, syncope, seizures, focal weakness  Psychosocial:  insomnia, anxiety, or depression. Additional positive findings:                          All other remaining systems are negative. PMH/PSH/SH/Family History:     Past Medical History:   Diagnosis Date    Arthritis     left knee and hands bilateral    Chondroblastic osteosarcoma ([facility] [address] 09/29/2014    Left distal tibia    Diabetes mellitus ([facility] [address]     type 2 1999    Hyperlipidemia     Unspecified cerebral artery occlusion with cerebral infarction 01/01/2008       Past Surgical History:   Procedure Laterality Date    ABDOMEN SURGERY      CHOLECYSTECTOMY      CHOLECYSTECTOMY  1977    Laparoscopic    COLONOSCOPY      COLONOSCOPY      X3.  Last one February 2014    ENDOSCOPY, COLON, DIAGNOSTIC      Upper GI prior to Cholecystectomy    FOREIGN BODY REMOVAL      LEG AMPUTATION BELOW KNEE Left 14    LEG SURGERY Left 14    biopsy left lower leg    LIPOMA RESECTION      OTHER SURGICAL HISTORY Left 10/22/2014    LEFT SUBCLAVIAN PORT REMOVAL              OTHER SURGICAL HISTORY Right 10/27/14    INSERTION OF SINGLE LUMEN PORT A CATH    TONSILLECTOMY      TONSILLECTOMY      childhood    TUNNELED VENOUS PORT PLACEMENT Right     Power Port       Social History     Socioeconomic History    Marital status: Single     Spouse name: Not on file    Number of children: Not on file    Years of education: Not on file    Highest education level: Not on file   Occupational History    Not on file   Tobacco Use    Smoking status: Former Smoker     Packs/day: 1.00     Years: 12.00     Pack years: 12.00     Quit date: 1982     Years since quittin.5    Smokeless tobacco: Not on file    Tobacco comment: quit    Substance and Sexual Activity    Alcohol use: Yes     Comment: occasional    Drug use: No    Sexual activity: Not Currently   Other Topics Concern    Not on file   Social History Narrative    ** Merged History Encounter **          Social Determinants of Health     Financial Resource Strain:     Difficulty of Paying Living Expenses: Not on file   Food Insecurity:     Worried About [address] in the Last Year: Not on file    [address] in the Last Year: Not on file   Transportation Needs:     Lack of Transportation (Medical): Not on file    Lack of Transportation (Non-Medical):  Not on file   Physical Activity:     Days of Exercise per Week: Not on file    Minutes of Exercise per Session: Not on file   Stress:     Feeling of Stress : Not on file   Social Connections:     Frequency of Communication with Friends and Family: Not on file    Frequency of Social Gatherings with Friends and Family: Not on file    Attends Jewish Services: Not on file    Active Member of Clubs or Organizations: Not on file    Attends Club or Organization Meetings: Not on file    Marital Status: Not on file   Intimate Partner Violence:     Fear of Current or Ex-Partner: Not on file    Emotionally Abused: Not on file    Physically Abused: Not on file    Sexually Abused: Not on file   Housing Stability:     Unable to Pay for Housing in the Last Year: Not on file    Number of Places Lived in the Last Year: Not on file    Unstable Housing in the Last Year: Not on file           Problem Relation Age of Onset    Emphysema Mother     Other Brother           Medication:      cefTRIAXone (ROCEPHIN) IV  1,000 mg IntraVENous Q24H    aspirin  81 mg Oral Daily    atorvastatin  20 mg Oral Daily    pregabalin  75 mg Oral BID    sodium chloride flush  5-40 mL IntraVENous 2 times per day    enoxaparin  40 mg SubCUTAneous Daily    vancomycin (VANCOCIN) intermittent dosing (placeholder)   Other RX Placeholder    insulin lispro  0-12 Units SubCUTAneous TID WC    insulin lispro  0-6 Units SubCUTAneous Nightly     dextromethorphan-guaiFENesin, calcium carbonate, melatonin, sodium chloride flush, sodium chloride, ondansetron **OR** ondansetron, polyethylene glycol, acetaminophen **OR** acetaminophen, glucose, dextrose bolus **OR** dextrose bolus, glucagon (rDNA), dextrose, HYDROmorphone, oxyCODONE-acetaminophen       Vitals :     Vitals:    06/26/22 0745   BP: 102/63   Pulse: 58   Resp: 18   Temp: 98.3 °F (36.8 °C)   SpO2: 95%       I & O :       Intake/Output Summary (Last 24 hours) at 6/26/2022 3966  Last data filed at 6/26/2022 7532  Gross per 24 hour   Intake 2171.17 ml   Output 1000 ml   Net 1171.17 ml        Physical Examination :     General appearance: Anxious- no, distressed- no, in good spirits- yes  HEENT: Lips- normal, teeth- ok , oral mucosa- moist  Neck : Mass- no, appears symmetrical, JVD- not visible  Respiratory: Respiratory effort-not labored, wheeze- no, crackles - no  Cardiovascular:  Bvdrejdwgat-D9-N5, Edema trace  Abdomen: visible mass- no, distention- no, scar- no, tenderness- no                            hepatosplenomegaly-  no  Musculoskeletal:  clubbing no,cyanosis- no , digital ischemia- no                           muscle strength- grossly normal , tone - grossly normal  Skin: rashes- no , ulcers- no, induration- no, tightening - no  Psychiatric:  Judgement and insight- normal           AAO X 3     LABS:     Recent Labs     06/24/22  1729 06/25/22  0612   WBC 6.3 5.2   HGB 12.2* 12.2*   HCT 36.2* 36.9*    241     Recent Labs     06/24/22  1729 06/25/22  0612    142   K 4.3 4.5    106   CO2 26 24   BUN 39* 43*   CREATININE 2.5* 2.6*   GLUCOSE 82 116*

## 2022-06-26 NOTE — DISCHARGE SUMMARY
Hospital Medicine Discharge Summary      Patient ID: Jamie Corley      Patient's PCP: Rejeana Lanes Date: 6/24/2022     Discharge Date:   6/26/2022    Admitting Physician: Jooj Blake MD    Discharge Physician: Jojo Blake MD     Discharge Diagnoses: Active Hospital Problems    Diagnosis Date Noted    Stage 3b chronic kidney disease ([facility] [address] [N18.32] 06/26/2022     Priority: Medium    Cellulitis [L03.90] 06/24/2022     Priority: Medium         The patient was seen and examined on day of discharge and this discharge summary is in conjunction with any daily progress note from day of discharge. Hospital Course: The patient is a 66 y.o. male with past medical history of chondroblastic osteosarcoma of lower extremity, s/p BKA, metastatic to lung and head and neck, used to be on Keytruda, currently being managed by [facility] who presented with worsening edema of left lower extremity and erythema. Patient was started as outpatient on Keflex with minimal improvement. Hospital course:  1. Left lower extremity cellulitis:  Failed Keflex as outpatient. Xrays negative for bone pathology. Infectious diseases consulted  Treated with IV vancomycin and ceftriaxone  Cellulitis much improved  Discharged on doxycycline for 7 more days     2. History of left distal tibia osteosarcoma, metastatic to lung and head and neck, status post Keytruda: Continue follow-up with [facility] as previously scheduled     3. Progressive CKD stage IV:  Patient follows with nephrology through [place] system.   Creatinine has been worsening  Nephrology consulted. Creatinine remained stable at 2.3  We will continue work-up for renal disease as outpatient    4. DM type II:  Stopped home metformin due to progressive CKD. Continue long-acting insulin. Addendum for the purposes of documentation:  Patient had left lower lobe lung nodule resected, positive for sarcoma.      Consults:     IP CONSULT TO HOSPITALIST  IP CONSULT TO PHARMACY  PHARMACY TO DOSE VANCOMYCIN  IP CONSULT TO PHARMACY  IP CONSULT TO INFECTIOUS DISEASES  IP CONSULT TO NEPHROLOGY  IP CONSULT TO HOME CARE NEEDS      Disposition: home with home care    Discharged Condition: Stable    Code Status: Full Code    Activity: activity as tolerated    Diet: diabetic diet    Follow Up: Primary Care Physician in one week    Exam:     General appearance: No apparent distress, appears stated age and cooperative. Lungs: Coarse breath sound bilaterally  Heart: Regular rate and rhythm with Normal S1/S2 without  murmurs, rubs or gallops, point of maximum impulse non-displaced  Abdomen: Soft, non-tender or non-distended without rigidity or guarding and positive bowel sounds all four quadrants. Extremities: Status post left BKA, edema improved  Skin: resolved erythema of left lower stump, skin crusting present, no active drainage  Neurologic: Alert and oriented X 3, grossly non-focal.  Mental status: Alert, oriented, thought content appropriate. Labs:  For convenience and continuity at follow-up the following most recent labs are provided:    CBC:   Lab Results   Component Value Date    WBC 5.2 06/25/2022    HGB 12.2 06/25/2022    HCT 36.9 06/25/2022     06/25/2022       RENAL:   Lab Results   Component Value Date     06/26/2022    K 4.2 06/26/2022     06/26/2022    CO2 22 06/26/2022    BUN 45 06/26/2022    CREATININE 2.6 06/26/2022           Discharge Medications:   Current Discharge Medication List           Details   doxycycline hyclate (VIBRA-TABS) 100 MG tablet Take 1 tablet by mouth 2 times daily for 7 days  Qty: 14 tablet, Refills: 0              Details   melatonin 10 MG CAPS capsule Take 10 mg by mouth nightly as needed      insulin glargine (LANTUS;BASAGLAR) 100 UNIT/ML injection pen Inject 19 Units into the skin daily      diphenhydrAMINE-APAP, sleep, (TYLENOL PM EXTRA STRENGTH)  MG tablet Take 1 tablet by mouth nightly as needed for Sleep atorvastatin (LIPITOR) 20 MG tablet Take 20 mg by mouth daily      pregabalin (LYRICA) 150 MG capsule Take 150 mg by mouth 2 times daily. aspirin 81 MG tablet Take 81 mg by mouth daily      vitamin D (CHOLECALCIFEROL) 1000 UNIT TABS tablet Take 1,000 Units by mouth daily Indications: 2 tablets daily                Time Spent on discharge is more than 30 minutes in the examination, evaluation, counseling and review of medications and discharge plan. Signed:  Hernando Rice MD   6/26/2022      Thank you Jada Diaz for the opportunity to be involved in this patient's care. If you have any questions or concerns please feel free to contact me at [phone].

## 2022-06-27 LAB
CREATININE URINE: 76.3 MG/DL (ref 39–259)
PROTEIN PROTEIN: 10 MG/DL
PROTEIN/CREAT RATIO: 0.1 MG/DL

## 2022-06-28 LAB
KAPPA, FREE LIGHT CHAINS, SERUM: 32.93 MG/L (ref 3.3–19.4)
KAPPA/LAMBDA RATIO: 0.48 (ref 0.26–1.65)
KAPPA/LAMBDA TEST COMMENT: ABNORMAL
LAMBDA, FREE LIGHT CHAINS, SERUM: 68.24 MG/L (ref 5.71–26.3)

## 2022-06-29 LAB
IGA: 330 MG/DL (ref 70–400)
IGG: 653 MG/DL (ref 700–1600)
IGM: 83 MG/DL (ref 40–230)
SPE/IFE INTERPRETATION: NORMAL

## 2022-06-30 LAB
ALBUMIN SERPL-MCNC: 2.6 G/DL (ref 3.1–4.9)
ALPHA-1-GLOBULIN: 0.3 G/DL (ref 0.2–0.4)
ALPHA-2-GLOBULIN: 1 G/DL (ref 0.4–1.1)
BETA GLOBULIN: 0.8 G/DL (ref 0.9–1.6)
GAMMA GLOBULIN: 0.9 G/DL (ref 0.6–1.8)
M SPIKE 1 SERUM PROTEIN ELEC: 0.3 G/DL
TOTAL PROTEIN: 5.6 G/DL (ref 6.4–8.2)

## 2022-07-30 ENCOUNTER — HOSPITAL ENCOUNTER (INPATIENT)
Age: 72
LOS: 9 days | Discharge: NURSING, REHAB OR LONG-TERM CARE | DRG: 853 | End: 2022-08-08
Attending: EMERGENCY MEDICINE | Admitting: INTERNAL MEDICINE
Payer: OTHER GOVERNMENT

## 2022-07-30 ENCOUNTER — APPOINTMENT (OUTPATIENT)
Dept: GENERAL RADIOLOGY | Age: 72
DRG: 853 | End: 2022-07-30
Payer: OTHER GOVERNMENT

## 2022-07-30 DIAGNOSIS — A41.9 SEPTICEMIA (HCC): Primary | ICD-10-CM

## 2022-07-30 LAB
A/G RATIO: 1.8 (ref 1.1–2.2)
ALBUMIN SERPL-MCNC: 4.2 G/DL (ref 3.4–5)
ALP BLD-CCNC: 73 U/L (ref 40–129)
ALT SERPL-CCNC: 26 U/L (ref 10–40)
ANION GAP SERPL CALCULATED.3IONS-SCNC: 11 MMOL/L (ref 3–16)
AST SERPL-CCNC: 22 U/L (ref 15–37)
BACTERIA: ABNORMAL /HPF
BASE EXCESS VENOUS: -0.5 MMOL/L (ref -2–3)
BASOPHILS ABSOLUTE: 0 K/UL (ref 0–0.2)
BASOPHILS RELATIVE PERCENT: 0.4 %
BILIRUB SERPL-MCNC: 0.6 MG/DL (ref 0–1)
BILIRUBIN URINE: NEGATIVE
BLOOD, URINE: ABNORMAL
BUN BLDV-MCNC: 33 MG/DL (ref 7–20)
CALCIUM SERPL-MCNC: 8.8 MG/DL (ref 8.3–10.6)
CARBOXYHEMOGLOBIN: 1.4 % (ref 0–1.5)
CHLORIDE BLD-SCNC: 102 MMOL/L (ref 99–110)
CLARITY: CLEAR
CO2: 24 MMOL/L (ref 21–32)
COLOR: YELLOW
CREAT SERPL-MCNC: 2.8 MG/DL (ref 0.8–1.3)
EOSINOPHILS ABSOLUTE: 0 K/UL (ref 0–0.6)
EOSINOPHILS RELATIVE PERCENT: 0.3 %
GFR AFRICAN AMERICAN: 27
GFR NON-AFRICAN AMERICAN: 22
GLUCOSE BLD-MCNC: 125 MG/DL (ref 70–99)
GLUCOSE URINE: 250 MG/DL
HCO3 VENOUS: 24.8 MMOL/L (ref 24–28)
HCT VFR BLD CALC: 36.3 % (ref 40.5–52.5)
HEMOGLOBIN, VEN, REDUCED: 42.8 %
HEMOGLOBIN: 12.7 G/DL (ref 13.5–17.5)
INFLUENZA A: NOT DETECTED
INFLUENZA B: NOT DETECTED
KETONES, URINE: NEGATIVE MG/DL
LACTIC ACID, SEPSIS: 1.3 MMOL/L (ref 0.4–1.9)
LEUKOCYTE ESTERASE, URINE: NEGATIVE
LYMPHOCYTES ABSOLUTE: 0.2 K/UL (ref 1–5.1)
LYMPHOCYTES RELATIVE PERCENT: 3.3 %
MCH RBC QN AUTO: 34.9 PG (ref 26–34)
MCHC RBC AUTO-ENTMCNC: 34.9 G/DL (ref 31–36)
MCV RBC AUTO: 100.2 FL (ref 80–100)
METHEMOGLOBIN VENOUS: 0.7 % (ref 0–1.5)
MICROSCOPIC EXAMINATION: YES
MONOCYTES ABSOLUTE: 0.5 K/UL (ref 0–1.3)
MONOCYTES RELATIVE PERCENT: 8.9 %
NEUTROPHILS ABSOLUTE: 5.4 K/UL (ref 1.7–7.7)
NEUTROPHILS RELATIVE PERCENT: 87.1 %
NITRITE, URINE: NEGATIVE
O2 SAT, VEN: 56 %
PCO2, VEN: 42.4 MMHG (ref 41–51)
PDW BLD-RTO: 13.2 % (ref 12.4–15.4)
PH UA: 6 (ref 5–8)
PH VENOUS: 7.38 (ref 7.35–7.45)
PLATELET # BLD: 183 K/UL (ref 135–450)
PMV BLD AUTO: 8.4 FL (ref 5–10.5)
PO2, VEN: <30 MMHG (ref 25–40)
POTASSIUM REFLEX MAGNESIUM: 4.7 MMOL/L (ref 3.5–5.1)
PROTEIN UA: NEGATIVE MG/DL
RBC # BLD: 3.62 M/UL (ref 4.2–5.9)
RBC UA: ABNORMAL /HPF (ref 0–4)
SARS-COV-2 RNA, RT PCR: DETECTED
SODIUM BLD-SCNC: 137 MMOL/L (ref 136–145)
SPECIFIC GRAVITY UA: 1.02 (ref 1–1.03)
TCO2 CALC VENOUS: 26 MMOL/L
TOTAL PROTEIN: 6.5 G/DL (ref 6.4–8.2)
TROPONIN: <0.01 NG/ML
URINE TYPE: ABNORMAL
UROBILINOGEN, URINE: 0.2 E.U./DL
WBC # BLD: 6.2 K/UL (ref 4–11)
WBC UA: ABNORMAL /HPF (ref 0–5)

## 2022-07-30 PROCEDURE — 84484 ASSAY OF TROPONIN QUANT: CPT

## 2022-07-30 PROCEDURE — 2580000003 HC RX 258: Performed by: EMERGENCY MEDICINE

## 2022-07-30 PROCEDURE — 83605 ASSAY OF LACTIC ACID: CPT

## 2022-07-30 PROCEDURE — 93005 ELECTROCARDIOGRAM TRACING: CPT | Performed by: EMERGENCY MEDICINE

## 2022-07-30 PROCEDURE — 96367 TX/PROPH/DG ADDL SEQ IV INF: CPT

## 2022-07-30 PROCEDURE — 84145 PROCALCITONIN (PCT): CPT

## 2022-07-30 PROCEDURE — 81001 URINALYSIS AUTO W/SCOPE: CPT

## 2022-07-30 PROCEDURE — 71045 X-RAY EXAM CHEST 1 VIEW: CPT

## 2022-07-30 PROCEDURE — 80053 COMPREHEN METABOLIC PANEL: CPT

## 2022-07-30 PROCEDURE — 87040 BLOOD CULTURE FOR BACTERIA: CPT

## 2022-07-30 PROCEDURE — 82803 BLOOD GASES ANY COMBINATION: CPT

## 2022-07-30 PROCEDURE — 96365 THER/PROPH/DIAG IV INF INIT: CPT

## 2022-07-30 PROCEDURE — 6360000002 HC RX W HCPCS: Performed by: EMERGENCY MEDICINE

## 2022-07-30 PROCEDURE — 36415 COLL VENOUS BLD VENIPUNCTURE: CPT

## 2022-07-30 PROCEDURE — 1200000000 HC SEMI PRIVATE

## 2022-07-30 PROCEDURE — 87636 SARSCOV2 & INF A&B AMP PRB: CPT

## 2022-07-30 PROCEDURE — 99285 EMERGENCY DEPT VISIT HI MDM: CPT

## 2022-07-30 PROCEDURE — 85025 COMPLETE CBC W/AUTO DIFF WBC: CPT

## 2022-07-30 RX ORDER — SODIUM CHLORIDE, SODIUM LACTATE, POTASSIUM CHLORIDE, AND CALCIUM CHLORIDE .6; .31; .03; .02 G/100ML; G/100ML; G/100ML; G/100ML
30 INJECTION, SOLUTION INTRAVENOUS ONCE
Status: COMPLETED | OUTPATIENT
Start: 2022-07-30 | End: 2022-07-30

## 2022-07-30 RX ORDER — SODIUM CHLORIDE 0.9 % (FLUSH) 0.9 %
5-40 SYRINGE (ML) INJECTION PRN
Status: DISCONTINUED | OUTPATIENT
Start: 2022-07-30 | End: 2022-08-08 | Stop reason: HOSPADM

## 2022-07-30 RX ORDER — DEXTROSE MONOHYDRATE 100 MG/ML
INJECTION, SOLUTION INTRAVENOUS CONTINUOUS PRN
Status: DISCONTINUED | OUTPATIENT
Start: 2022-07-30 | End: 2022-08-08 | Stop reason: HOSPADM

## 2022-07-30 RX ORDER — INSULIN LISPRO 100 [IU]/ML
0-4 INJECTION, SOLUTION INTRAVENOUS; SUBCUTANEOUS
Status: DISCONTINUED | OUTPATIENT
Start: 2022-07-31 | End: 2022-08-03

## 2022-07-30 RX ORDER — SODIUM CHLORIDE 9 MG/ML
INJECTION, SOLUTION INTRAVENOUS PRN
Status: DISCONTINUED | OUTPATIENT
Start: 2022-07-30 | End: 2022-08-08 | Stop reason: HOSPADM

## 2022-07-30 RX ORDER — INSULIN LISPRO 100 [IU]/ML
0-4 INJECTION, SOLUTION INTRAVENOUS; SUBCUTANEOUS NIGHTLY
Status: DISCONTINUED | OUTPATIENT
Start: 2022-07-30 | End: 2022-08-03

## 2022-07-30 RX ORDER — SODIUM CHLORIDE 0.9 % (FLUSH) 0.9 %
5-40 SYRINGE (ML) INJECTION EVERY 12 HOURS SCHEDULED
Status: DISCONTINUED | OUTPATIENT
Start: 2022-07-30 | End: 2022-08-08 | Stop reason: HOSPADM

## 2022-07-30 RX ADMIN — SODIUM CHLORIDE, POTASSIUM CHLORIDE, SODIUM LACTATE AND CALCIUM CHLORIDE 1000 ML: 600; 310; 30; 20 INJECTION, SOLUTION INTRAVENOUS at 19:55

## 2022-07-30 RX ADMIN — CEFEPIME 2000 MG: 2 INJECTION, POWDER, FOR SOLUTION INTRAMUSCULAR; INTRAVENOUS at 20:03

## 2022-07-30 RX ADMIN — VANCOMYCIN HYDROCHLORIDE 2000 MG: 10 INJECTION, POWDER, LYOPHILIZED, FOR SOLUTION INTRAVENOUS at 21:00

## 2022-07-30 NOTE — Clinical Note
Discharge Plan[de-identified] Home with Office Follow-up   Telemetry/Cardiac Monitoring Required?: Yes
Ambulatory

## 2022-07-30 NOTE — ED PROVIDER NOTES
[address] [facility]          ATTENDING PHYSICIAN NOTE       Date of evaluation: 7/30/2022    Chief Complaint     Fever (Fall at home, denies hitting head, family concerned for decrease in energy )      History of Present Illness     Lianna Bishop is a 66 y.o. Johnita Haley a past medical history of Chondroblastic osteosarcoma of the lower extremity status post BKA with metastases to the lung, head/neck who presents to the emergency department today with fever. He noticed this last night. He has had a cough without sputum production. He has been hospitalized in the past for cellulitis due to the stump on his left lower extremity. He states this is resolved and continues to improve. No chest pain, shortness of breath, abdominal pain, nausea, vomiting, diarrhea, constipation, urinary symptoms. Review of Systems     As documented in HPI, otherwise all other systems were reviewed and negative    Past Medical, Surgical, Family, and Social History     He has a past medical history of Arthritis, Chondroblastic osteosarcoma ([address] Diabetes mellitus ([facility] [address] Hyperlipidemia, and Unspecified cerebral artery occlusion with cerebral infarction. He has a past surgical history that includes Cholecystectomy; lipoma resection (2013); Foreign Body Removal; Colonoscopy; Leg Surgery (Left, 8/18/14); Tonsillectomy; other surgical history (Left, 10/22/2014); other surgical history (Right, 10/27/14); Tonsillectomy; Abdomen surgery; Cholecystectomy (1977); Colonoscopy; Endoscopy, colon, diagnostic; Tunneled venous port placement (Right); and Leg amputation below knee (Left, 12/08/14). His family history includes Emphysema in his mother; Other in his brother. He reports that he quit smoking about 40 years ago. His smoking use included cigarettes. He has a 12.00 pack-year smoking history. He has never used smokeless tobacco. He reports current alcohol use.  He reports that he does not use drugs.    Medications     Previous Medications    ASPIRIN 81 MG TABLET    Take 81 mg by mouth daily    ATORVASTATIN (LIPITOR) 20 MG TABLET    Take 20 mg by mouth daily    DIPHENHYDRAMINE-APAP, SLEEP, (TYLENOL PM EXTRA STRENGTH)  MG TABLET    Take 1 tablet by mouth nightly as needed for Sleep    INSULIN GLARGINE (LANTUS;BASAGLAR) 100 UNIT/ML INJECTION PEN    Inject 19 Units into the skin daily    MELATONIN 10 MG CAPS CAPSULE    Take 10 mg by mouth nightly as needed    PREGABALIN (LYRICA) 150 MG CAPSULE    Take 150 mg by mouth 2 times daily. VITAMIN D (CHOLECALCIFEROL) 1000 UNIT TABS TABLET    Take 1,000 Units by mouth daily Indications: 2 tablets daily       Allergies     He is allergic to compazine [prochlorperazine maleate]. Physical Exam     INITIAL VITALS: BP: (!) 93/53, Temp: (!) 100.6 °F (38.1 °C), Heart Rate: 68, Resp: 18, SpO2: 93 %   General: No acute distress  HEENT: head is atraumatic, pupils equal round and reactive to light, sclera are clear, oropharynx is nonerythematous  Neck: Supple, no lymphadenopathy  Chest: Nonlabored respirations, equal chest rise bilaterally, no accessory muscle use  Cardiovascular: Normal rate, regular rhythm, 2+ radial pulses bilaterally  Abdominal: Soft, nontender, nondistended, no rebound or guarding  Back: No CVA tenderness bilaterally  Skin: Warm, dry, well-perfused, scabbing to the stump of his left AKA. Musculoskeletal: Left lower extremity AKA. No obvious deformities, no tenderness to palpation diffusely  Neurologic: Alert and oriented x4, speech is clear and intact without dysarthria, moving all extremities normally  Psych: Appropriate mood and affect  Diagnostic Results     EKG   Twelve-lead EKG performed on 7/30/2022 at 1941 hrs. Sinus rhythm at a rate of 74. Left axis deviation. Poor R wave progression. No significant Q waves noted. No ST segment changes noted. No T wave inversions noted. QTc normal at 406. QRS normal at 104.   Peer interval normal 138. No STEMI. RADIOLOGY:  XR CHEST PORTABLE   Final Result   1. No acute disease.           LABS:   Results for orders placed or performed during the hospital encounter of 07/30/22   Lactate, Sepsis   Result Value Ref Range    Lactic Acid, Sepsis 1.3 0.4 - 1.9 mmol/L   Blood gas, venous   Result Value Ref Range    pH, Fredo 7.376 7.350 - 7.450    pCO2, Fredo 42.4 41.0 - 51.0 mmHg    pO2, Fredo <30.0 25.0 - 40.0 mmHg    HCO3, Venous 24.8 24.0 - 28.0 mmol/L    Base Excess, Fredo -0.5 -2.0 - 3.0 mmol/L    O2 Sat, Fredo 56 Not established %    Carboxyhemoglobin 1.4 0.0 - 1.5 %    MetHgb, Fredo 0.7 0.0 - 1.5 %    TC02 (Calc), Fredo 26 mmol/L    Hemoglobin, Fredo, Reduced 42.80 %   CBC with Auto Differential   Result Value Ref Range    WBC 6.2 4.0 - 11.0 K/uL    RBC 3.62 (L) 4.20 - 5.90 M/uL    Hemoglobin 12.7 (L) 13.5 - 17.5 g/dL    Hematocrit 36.3 (L) 40.5 - 52.5 %    .2 (H) 80.0 - 100.0 fL    MCH 34.9 (H) 26.0 - 34.0 pg    MCHC 34.9 31.0 - 36.0 g/dL    RDW 13.2 12.4 - 15.4 %    Platelets 060 490 - 168 K/uL    MPV 8.4 5.0 - 10.5 fL    Neutrophils % 87.1 %    Lymphocytes % 3.3 %    Monocytes % 8.9 %    Eosinophils % 0.3 %    Basophils % 0.4 %    Neutrophils Absolute 5.4 1.7 - 7.7 K/uL    Lymphocytes Absolute 0.2 (L) 1.0 - 5.1 K/uL    Monocytes Absolute 0.5 0.0 - 1.3 K/uL    Eosinophils Absolute 0.0 0.0 - 0.6 K/uL    Basophils Absolute 0.0 0.0 - 0.2 K/uL   Comprehensive Metabolic Panel w/ Reflex to MG   Result Value Ref Range    Sodium 137 136 - 145 mmol/L    Potassium reflex Magnesium 4.7 3.5 - 5.1 mmol/L    Chloride 102 99 - 110 mmol/L    CO2 24 21 - 32 mmol/L    Anion Gap 11 3 - 16    Glucose 125 (H) 70 - 99 mg/dL    BUN 33 (H) 7 - 20 mg/dL    Creatinine 2.8 (H) 0.8 - 1.3 mg/dL    GFR Non-African American 22 (A) >60    GFR  27 (A) >60    Calcium 8.8 8.3 - 10.6 mg/dL    Total Protein 6.5 6.4 - 8.2 g/dL    Albumin 4.2 3.4 - 5.0 g/dL    Albumin/Globulin Ratio 1.8 1.1 - 2.2    Total Bilirubin 0.6 0.0 - 1.0 mg/dL    Alkaline Phosphatase 73 40 - 129 U/L    ALT 26 10 - 40 U/L    AST 22 15 - 37 U/L   Troponin   Result Value Ref Range    Troponin <0.01 <0.01 ng/mL   Urinalysis with Microscopic   Result Value Ref Range    Color, UA Yellow Straw/Yellow    Clarity, UA Clear Clear    Glucose, Ur 250 (A) Negative mg/dL    Bilirubin Urine Negative Negative    Ketones, Urine Negative Negative mg/dL    Specific Gravity, UA 1.020 1.005 - 1.030    Blood, Urine SMALL (A) Negative    pH, UA 6.0 5.0 - 8.0    Protein, UA Negative Negative mg/dL    Urobilinogen, Urine 0.2 <2.0 E.U./dL    Nitrite, Urine Negative Negative    Leukocyte Esterase, Urine Negative Negative    Microscopic Examination YES     Urine Type Other     WBC, UA 0-2 0 - 5 /HPF    RBC, UA 3-4 0 - 4 /HPF    Bacteria, UA 1+ (A) None Seen /HPF       ED BEDSIDE ULTRASOUND:  No results found. RECENT VITALS:  BP: (!) 93/53,Temp: (!) 100.6 °F (38.1 °C), Heart Rate: 68, Resp: 18, SpO2: 93 %     Procedures     Not applicable    ED Course     Nursing Notes, Past Medical Hx, Past Surgical Hx, Social Hx,Allergies, and Family Hx were reviewed. patient was given the following medications:  Orders Placed This Encounter   Medications    sodium chloride flush 0.9 % injection 5-40 mL    sodium chloride flush 0.9 % injection 5-40 mL    0.9 % sodium chloride infusion    lactated ringers bolus     Order Specific Question:   Was full 30mL/kg fluid bolus ordered? Answer:   Yes    cefepime (MAXIPIME) 2000 mg IVPB minibag     Order Specific Question:   Antimicrobial Indications     Answer: Other     Order Specific Question:   Other Abx Indication     Answer: Suspected Sepsis of Skin or Soft Tissue Origin    vancomycin (VANCOCIN) 2,000 mg in dextrose 5 % 500 mL IVPB     Order Specific Question:   Antimicrobial Indications     Answer: Other     Order Specific Question:   Other Abx Indication     Answer:    Suspected Sepsis of Skin or Soft Tissue Origin    insulin lispro (1 Unit Dial) 0-4 Units    insulin lispro (1 Unit Dial) 0-4 Units    glucose chewable tablet 16 g    OR Linked Order Group     dextrose bolus 10% 125 mL     dextrose bolus 10% 250 mL    glucagon (rDNA) injection 1 mg    dextrose 10 % infusion       CONSULTS:  IP CONSULT TO HOSPITALIST  IP CONSULT TO PHARMACY    MEDICAL DECISIONMAKING / ASSESSMENT / PLAN     Differential Diagnosis: Pneumonia, UTI, COVID, bacteremia, sepsis, dehydration, electrolyte abnormality, renal failure    Yoko Lockhart is a 66 y.o. Metz Solum a past medical history of Chondroblastic osteosarcoma of the lower extremity status post BKA with metastases to the lung, head/neck who presents to the emergency department today with fever. He arrives to the emergency department his ABCs intact. He is hypotensive and febrile on arrival.  He has a left AKA with scabbing noted to the stump. No other abnormalities noted on exam.  Chest x-ray showed no acute cardiopulmonary abnormalities. Lactate is normal.  Labs significant for an elevated creatinine which is consistent with his prior values. UA negative for UTI. He received 30 mL/kg of IV fluids. He was also given vancomycin and cefepime. On repeat evaluation, there was some improvement of his blood pressure. I discussed his case with the hospitalist who agreed to admit him for sepsis without a source. He is admitted in stable condition. Clinical Impression     1. Septicemia ([facility] [address]        Disposition     PATIENT REFERRED TO:  No follow-up provider specified.     DISCHARGE MEDICATIONS:  New Prescriptions    No medications on file       DISPOSITION Admitted 07/30/2022 09:29:59 PM         Jamie Campoverde MD  07/30/22 2835

## 2022-07-31 LAB
A/G RATIO: 2.2 (ref 1.1–2.2)
ALBUMIN SERPL-MCNC: 3.7 G/DL (ref 3.4–5)
ALP BLD-CCNC: 63 U/L (ref 40–129)
ALT SERPL-CCNC: 28 U/L (ref 10–40)
ANION GAP SERPL CALCULATED.3IONS-SCNC: 11 MMOL/L (ref 3–16)
AST SERPL-CCNC: 38 U/L (ref 15–37)
BASOPHILS ABSOLUTE: 0 K/UL (ref 0–0.2)
BASOPHILS RELATIVE PERCENT: 0.1 %
BILIRUB SERPL-MCNC: 0.6 MG/DL (ref 0–1)
BUN BLDV-MCNC: 36 MG/DL (ref 7–20)
C-REACTIVE PROTEIN: 34.5 MG/L (ref 0–5.1)
CALCIUM SERPL-MCNC: 8.4 MG/DL (ref 8.3–10.6)
CHLORIDE BLD-SCNC: 102 MMOL/L (ref 99–110)
CO2: 24 MMOL/L (ref 21–32)
CREAT SERPL-MCNC: 2.9 MG/DL (ref 0.8–1.3)
EKG ATRIAL RATE: 74 BPM
EKG DIAGNOSIS: NORMAL
EKG P AXIS: 51 DEGREES
EKG P-R INTERVAL: 138 MS
EKG Q-T INTERVAL: 366 MS
EKG QRS DURATION: 104 MS
EKG QTC CALCULATION (BAZETT): 406 MS
EKG R AXIS: -66 DEGREES
EKG T AXIS: 27 DEGREES
EKG VENTRICULAR RATE: 74 BPM
EOSINOPHILS ABSOLUTE: 0 K/UL (ref 0–0.6)
EOSINOPHILS RELATIVE PERCENT: 0.1 %
GFR AFRICAN AMERICAN: 26
GFR NON-AFRICAN AMERICAN: 21
GLUCOSE BLD-MCNC: 105 MG/DL (ref 70–99)
GLUCOSE BLD-MCNC: 127 MG/DL (ref 70–99)
GLUCOSE BLD-MCNC: 134 MG/DL (ref 70–99)
GLUCOSE BLD-MCNC: 135 MG/DL (ref 70–99)
GLUCOSE BLD-MCNC: 205 MG/DL (ref 70–99)
GLUCOSE BLD-MCNC: 56 MG/DL (ref 70–99)
HCT VFR BLD CALC: 32.9 % (ref 40.5–52.5)
HEMOGLOBIN: 11.3 G/DL (ref 13.5–17.5)
LACTIC ACID, SEPSIS: 2.4 MMOL/L (ref 0.4–1.9)
LACTIC ACID: 1.2 MMOL/L (ref 0.4–2)
LACTIC ACID: 1.3 MMOL/L (ref 0.4–2)
LACTIC ACID: 2.5 MMOL/L (ref 0.4–2)
LYMPHOCYTES ABSOLUTE: 0.4 K/UL (ref 1–5.1)
LYMPHOCYTES RELATIVE PERCENT: 8.2 %
MCH RBC QN AUTO: 34.5 PG (ref 26–34)
MCHC RBC AUTO-ENTMCNC: 34.3 G/DL (ref 31–36)
MCV RBC AUTO: 100.8 FL (ref 80–100)
MONOCYTES ABSOLUTE: 0.7 K/UL (ref 0–1.3)
MONOCYTES RELATIVE PERCENT: 15.3 %
NEUTROPHILS ABSOLUTE: 3.5 K/UL (ref 1.7–7.7)
NEUTROPHILS RELATIVE PERCENT: 76.3 %
PDW BLD-RTO: 13.5 % (ref 12.4–15.4)
PERFORMED ON: ABNORMAL
PHOSPHORUS: 3.2 MG/DL (ref 2.5–4.9)
PLATELET # BLD: 172 K/UL (ref 135–450)
PMV BLD AUTO: 8.2 FL (ref 5–10.5)
POTASSIUM REFLEX MAGNESIUM: 4.4 MMOL/L (ref 3.5–5.1)
POTASSIUM SERPL-SCNC: 4.4 MMOL/L (ref 3.5–5.1)
PROCALCITONIN: 0.32 NG/ML (ref 0–0.15)
RBC # BLD: 3.26 M/UL (ref 4.2–5.9)
SEDIMENTATION RATE, ERYTHROCYTE: 5 MM/HR (ref 0–20)
SODIUM BLD-SCNC: 137 MMOL/L (ref 136–145)
TOTAL PROTEIN: 5.4 G/DL (ref 6.4–8.2)
WBC # BLD: 4.6 K/UL (ref 4–11)

## 2022-07-31 PROCEDURE — 6360000002 HC RX W HCPCS: Performed by: INTERNAL MEDICINE

## 2022-07-31 PROCEDURE — 6360000002 HC RX W HCPCS

## 2022-07-31 PROCEDURE — 2580000003 HC RX 258: Performed by: STUDENT IN AN ORGANIZED HEALTH CARE EDUCATION/TRAINING PROGRAM

## 2022-07-31 PROCEDURE — 2580000003 HC RX 258

## 2022-07-31 PROCEDURE — 1200000000 HC SEMI PRIVATE

## 2022-07-31 PROCEDURE — 2580000003 HC RX 258: Performed by: INTERNAL MEDICINE

## 2022-07-31 PROCEDURE — 92610 EVALUATE SWALLOWING FUNCTION: CPT

## 2022-07-31 PROCEDURE — 87040 BLOOD CULTURE FOR BACTERIA: CPT

## 2022-07-31 PROCEDURE — 85652 RBC SED RATE AUTOMATED: CPT

## 2022-07-31 PROCEDURE — 86140 C-REACTIVE PROTEIN: CPT

## 2022-07-31 PROCEDURE — 2500000003 HC RX 250 WO HCPCS

## 2022-07-31 PROCEDURE — 80053 COMPREHEN METABOLIC PANEL: CPT

## 2022-07-31 PROCEDURE — 83605 ASSAY OF LACTIC ACID: CPT

## 2022-07-31 PROCEDURE — 36415 COLL VENOUS BLD VENIPUNCTURE: CPT

## 2022-07-31 PROCEDURE — 6370000000 HC RX 637 (ALT 250 FOR IP)

## 2022-07-31 PROCEDURE — 85025 COMPLETE CBC W/AUTO DIFF WBC: CPT

## 2022-07-31 RX ORDER — SODIUM CHLORIDE 9 MG/ML
INJECTION, SOLUTION INTRAVENOUS PRN
Status: DISCONTINUED | OUTPATIENT
Start: 2022-07-31 | End: 2022-08-08 | Stop reason: HOSPADM

## 2022-07-31 RX ORDER — SODIUM CHLORIDE 0.9 % (FLUSH) 0.9 %
5-40 SYRINGE (ML) INJECTION PRN
Status: DISCONTINUED | OUTPATIENT
Start: 2022-07-31 | End: 2022-08-08 | Stop reason: HOSPADM

## 2022-07-31 RX ORDER — HEPARIN SODIUM 5000 [USP'U]/ML
5000 INJECTION, SOLUTION INTRAVENOUS; SUBCUTANEOUS EVERY 8 HOURS SCHEDULED
Status: DISCONTINUED | OUTPATIENT
Start: 2022-07-31 | End: 2022-08-08 | Stop reason: HOSPADM

## 2022-07-31 RX ORDER — ONDANSETRON 2 MG/ML
4 INJECTION INTRAMUSCULAR; INTRAVENOUS EVERY 6 HOURS PRN
Status: DISCONTINUED | OUTPATIENT
Start: 2022-07-31 | End: 2022-08-08 | Stop reason: HOSPADM

## 2022-07-31 RX ORDER — ONDANSETRON 4 MG/1
4 TABLET, ORALLY DISINTEGRATING ORAL EVERY 8 HOURS PRN
Status: DISCONTINUED | OUTPATIENT
Start: 2022-07-31 | End: 2022-08-08 | Stop reason: HOSPADM

## 2022-07-31 RX ORDER — ASPIRIN 81 MG/1
81 TABLET ORAL DAILY
Status: DISCONTINUED | OUTPATIENT
Start: 2022-07-31 | End: 2022-08-08 | Stop reason: HOSPADM

## 2022-07-31 RX ORDER — POLYETHYLENE GLYCOL 3350 17 G/17G
17 POWDER, FOR SOLUTION ORAL DAILY PRN
Status: DISCONTINUED | OUTPATIENT
Start: 2022-07-31 | End: 2022-08-08 | Stop reason: HOSPADM

## 2022-07-31 RX ORDER — SODIUM CHLORIDE 9 MG/ML
INJECTION, SOLUTION INTRAVENOUS CONTINUOUS
Status: DISCONTINUED | OUTPATIENT
Start: 2022-07-31 | End: 2022-08-01

## 2022-07-31 RX ORDER — ACETAMINOPHEN 650 MG/1
650 SUPPOSITORY RECTAL EVERY 6 HOURS PRN
Status: DISCONTINUED | OUTPATIENT
Start: 2022-07-31 | End: 2022-08-08 | Stop reason: HOSPADM

## 2022-07-31 RX ORDER — DIPHENHYDRAMINE HCL 25 MG
25 TABLET ORAL NIGHTLY PRN
Status: DISCONTINUED | OUTPATIENT
Start: 2022-07-31 | End: 2022-08-08 | Stop reason: HOSPADM

## 2022-07-31 RX ORDER — ATORVASTATIN CALCIUM 20 MG/1
20 TABLET, FILM COATED ORAL DAILY
Status: DISCONTINUED | OUTPATIENT
Start: 2022-07-31 | End: 2022-08-08 | Stop reason: HOSPADM

## 2022-07-31 RX ORDER — MECOBALAMIN 5000 MCG
10 TABLET,DISINTEGRATING ORAL NIGHTLY PRN
Status: DISCONTINUED | OUTPATIENT
Start: 2022-07-31 | End: 2022-08-08 | Stop reason: HOSPADM

## 2022-07-31 RX ORDER — BENZONATATE 100 MG/1
100 CAPSULE ORAL 3 TIMES DAILY PRN
Status: DISCONTINUED | OUTPATIENT
Start: 2022-07-31 | End: 2022-08-08 | Stop reason: HOSPADM

## 2022-07-31 RX ORDER — SODIUM CHLORIDE 0.9 % (FLUSH) 0.9 %
5-40 SYRINGE (ML) INJECTION EVERY 12 HOURS SCHEDULED
Status: DISCONTINUED | OUTPATIENT
Start: 2022-07-31 | End: 2022-08-08 | Stop reason: HOSPADM

## 2022-07-31 RX ORDER — ACETAMINOPHEN 325 MG/1
650 TABLET ORAL EVERY 6 HOURS PRN
Status: DISCONTINUED | OUTPATIENT
Start: 2022-07-31 | End: 2022-08-08 | Stop reason: HOSPADM

## 2022-07-31 RX ORDER — DEXTROSE MONOHYDRATE 25 G/50ML
50 INJECTION, SOLUTION INTRAVENOUS ONCE
Status: COMPLETED | OUTPATIENT
Start: 2022-07-31 | End: 2022-07-31

## 2022-07-31 RX ADMIN — CEFTAROLINE FOSAMIL 300 MG: 600 POWDER, FOR SOLUTION INTRAVENOUS at 10:14

## 2022-07-31 RX ADMIN — DEXTROSE MONOHYDRATE 50 ML: 25 INJECTION, SOLUTION INTRAVENOUS at 06:50

## 2022-07-31 RX ADMIN — CEFTAROLINE FOSAMIL 300 MG: 600 POWDER, FOR SOLUTION INTRAVENOUS at 20:38

## 2022-07-31 RX ADMIN — Medication 5 ML: at 03:52

## 2022-07-31 RX ADMIN — SODIUM CHLORIDE, PRESERVATIVE FREE 5 ML: 5 INJECTION INTRAVENOUS at 20:42

## 2022-07-31 RX ADMIN — HEPARIN SODIUM 5000 UNITS: 5000 INJECTION INTRAVENOUS; SUBCUTANEOUS at 06:05

## 2022-07-31 RX ADMIN — HEPARIN SODIUM 5000 UNITS: 5000 INJECTION INTRAVENOUS; SUBCUTANEOUS at 13:45

## 2022-07-31 RX ADMIN — ACETAMINOPHEN 650 MG: 325 TABLET ORAL at 23:40

## 2022-07-31 RX ADMIN — SODIUM CHLORIDE: 9 INJECTION, SOLUTION INTRAVENOUS at 20:44

## 2022-07-31 RX ADMIN — SODIUM CHLORIDE: 9 INJECTION, SOLUTION INTRAVENOUS at 10:13

## 2022-07-31 RX ADMIN — ATORVASTATIN CALCIUM 20 MG: 20 TABLET, FILM COATED ORAL at 10:14

## 2022-07-31 RX ADMIN — ACETAMINOPHEN 650 MG: 325 TABLET ORAL at 03:42

## 2022-07-31 RX ADMIN — SODIUM CHLORIDE: 9 INJECTION, SOLUTION INTRAVENOUS at 13:43

## 2022-07-31 RX ADMIN — SODIUM CHLORIDE: 9 INJECTION, SOLUTION INTRAVENOUS at 03:43

## 2022-07-31 RX ADMIN — HEPARIN SODIUM 5000 UNITS: 5000 INJECTION INTRAVENOUS; SUBCUTANEOUS at 22:15

## 2022-07-31 RX ADMIN — ASPIRIN 81 MG: 81 TABLET, COATED ORAL at 10:14

## 2022-07-31 RX ADMIN — CEFTRIAXONE 1000 MG: 1 INJECTION, POWDER, FOR SOLUTION INTRAMUSCULAR; INTRAVENOUS at 04:05

## 2022-07-31 ASSESSMENT — PAIN SCALES - GENERAL
PAINLEVEL_OUTOF10: 0

## 2022-07-31 NOTE — CONSULTS
Clinical Pharmacy Progress Note  Medication History     Admit Date: 7/30/2022    Pharmacy consulted to verify home medication list by Dr. Shelia Beasley. List of of current medications patient is taking is complete. Home Medication list in EPIC updated to reflect changes noted below. Source of information: telephone interview with patient    Patient's home pharmacy: [facility]       Current Outpatient Medications   Medication Instructions    aspirin 81 mg, Oral, DAILY    atorvastatin (LIPITOR) 20 mg, Oral, DAILY    diphenhydrAMINE-APAP, sleep, (TYLENOL PM EXTRA STRENGTH)  MG tablet 1 tablet, Oral, NIGHTLY PRN    insulin glargine (LANTUS;BASAGLAR) 19 Units, SubCUTAneous, DAILY    melatonin 10 mg, Oral, NIGHTLY PRN    pregabalin (LYRICA) 150 mg, Oral, 2 TIMES DAILY    vitamin D (CHOLECALCIFEROL) 1,000 Units, Oral, DAILY       Please call with any questions.   Toy ThompsonD, BCPS  Wireless: [id number]   7/31/2022 11:33 AM

## 2022-07-31 NOTE — H&P
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 Cj Lacy [facility], type 2 diabetes mellitus, hyperlipidemia, CKD stage III, HLD who presented to the ED with complaints of a fever. He reports that he checks his temperature daily and decided to come to the ED when he had a temperature of 100.2 today. He also reports feeling weak and had a fall today while shifting himself from the chair to the bed. Review of systems positive for body aches and fever. He denies chills, chest pain, shortness of, abdominal pain, changes in bowel habits, dysuria. Physical exam revealed rhonchorous lungs. Abdomen distended, chronic hernia, nontender. BKA of the left leg. Strikingly, the skin below the right knee extending towards the ankle was markedly warm without any erythema. Patient denies any recent swelling or pain in the leg. Multiple excoriations seen on the skin in the LE. On arrival to the ED, febrile to 100.6, BP 93/53, reportedly tachycardic, was on room air. Work-up in the ED revealing a slight rise in baseline creatinine. Normal leukocyte count as well as normal hemoglobin levels. UA unremarkable for a UTI. VBG normal.  Chest x-ray did not show any acute abnormality. Patient received sepsis bolus as well as Vancomycin and cefepime in the ED. Patient admitted for concern of an infectious etiology behind the weakness and fever. Blood cultures sent, covid swab done prior to admission. [facility].      Past Medical History:        Diagnosis Date    Arthritis     left knee and hands bilateral    Chondroblastic osteosarcoma ([facility] [address] 09/29/2014    Left distal tibia    Diabetes mellitus ([facility] [address]     type 2 1999    Hyperlipidemia     Unspecified cerebral artery occlusion with cerebral infarction 01/01/2008       Past Surgical History:        Procedure Laterality Date    ABDOMEN SURGERY      CHOLECYSTECTOMY      CHOLECYSTECTOMY  1977    Laparoscopic    COLONOSCOPY      COLONOSCOPY      X3. Last one February 2014    ENDOSCOPY, COLON, DIAGNOSTIC      Upper GI prior to Cholecystectomy    FOREIGN BODY REMOVAL      LEG AMPUTATION BELOW KNEE Left 12/08/14    LEG SURGERY Left 8/18/14    biopsy left lower leg    LIPOMA RESECTION  2013    OTHER SURGICAL HISTORY Left 10/22/2014    LEFT SUBCLAVIAN PORT REMOVAL              OTHER SURGICAL HISTORY Right 10/27/14    INSERTION OF SINGLE LUMEN PORT A CATH    TONSILLECTOMY      TONSILLECTOMY      childhood    TUNNELED VENOUS PORT PLACEMENT Right     Power Port       Medications Priorto Admission:    Not in a hospital admission. Allergies:  Compazine [prochlorperazine maleate]    Social History:   TOBACCO:   reports that he quit smoking about 40 years ago. His smoking use included cigarettes. He has a 12.00 pack-year smoking history. He has never used smokeless tobacco.  ETOH:   reports current alcohol use. DRUGS : None. Patient currently lives with family son. Family History:       Problem Relation Age of Onset    Emphysema Mother     Other Brother        Review of Systems    ROS: A 10 point review of systems was conducted, significant findings as noted in HPI. Physical Exam  Constitutional:       General: He is not in acute distress. Appearance: He is not diaphoretic. HENT:      Nose: No congestion or rhinorrhea. Eyes:      Extraocular Movements: Extraocular movements intact. Pupils: Pupils are equal, round, and reactive to light. Cardiovascular:      Rate and Rhythm: Normal rate and regular rhythm. Heart sounds: No murmur heard. No friction rub. Pulmonary:      Effort: No respiratory distress. Breath sounds: Rhonchi present.    Abdominal: General: There is distension. Tenderness: There is no abdominal tenderness. There is no guarding or rebound. Hernia: A hernia is present. Musculoskeletal:         General: Signs of injury present. Right lower leg: Edema present. Comments: BKA left leg   Skin:     General: Skin is warm. Findings: Rash present. Neurological:      Mental Status: He is alert and oriented to person, place, and time. Psychiatric:         Mood and Affect: Mood normal.         Behavior: Behavior normal.         Thought Content: Thought content normal.         Judgment: Judgment normal.     Physical exam:       Vitals:    07/30/22 1834   BP: (!) 93/53   Pulse: 68   Resp: 18   Temp: (!) 100.6 °F (38.1 °C)   SpO2: 93%       DATA:    Labs:  CBC:   Recent Labs     07/30/22 1927   WBC 6.2   HGB 12.7*   HCT 36.3*          BMP:   Recent Labs     07/30/22 1927      K 4.7      CO2 24   BUN 33*   CREATININE 2.8*   GLUCOSE 125*     LFT's:   Recent Labs     07/30/22 1927   AST 22   ALT 26   BILITOT 0.6   ALKPHOS 73     Troponin:   Recent Labs     07/30/22 1927   TROPONINI <0.01     BNP:No results for input(s): BNP in the last 72 hours. ABGs: No results for input(s): PHART, WHD7UMY, PO2ART in the last 72 hours. INR: No results for input(s): INR in the last 72 hours. U/A:  Recent Labs     07/30/22 1927   COLORU Yellow   PHUR 6.0   WBCUA 0-2   RBCUA 3-4   BACTERIA 1+*   CLARITYU Clear   SPECGRAV 1.020   LEUKOCYTESUR Negative   UROBILINOGEN 0.2   BILIRUBINUR Negative   BLOODU SMALL*   GLUCOSEU 250*       XR CHEST PORTABLE   Final Result   1. No acute disease. ASSESSMENT AND PLAN:    fever, hypotension:  Suspicion of sepsis:  Patient met SIRS criterion on arrival. Lactate wnl, no leukocytosis. Patient with symptoms of cough, weakness, body aches, concerning for a possible viral infection. Lab work essentially unremarkable, no leukocytosis, UA unremarkable. CXR unremarkable.    S/p sepsis bolus, Vancomycin and Cefepime in the ED. Workup: Blood cultures sent, procalcitonin. SIRS-CoV-2 detected. Plan:  Continue soft fluids. Suspicion of developing cellulitis of the right leg. MILEY on CKD Stage III vs  AIN:  BUN 33, Cr 2.8 respectively, slight rise from baseline Cr 2.5. S/p sepsis bolus in the ED. Continue fluids. Patient follows up with [facility] nephrology outpatient. Rule out COVID:  Symptoms of weakness, severe body aches, cough. No complaints of SOB, patient currently on room air. CBC unremarkable. CXR unremarkable. Awaiting results    Chronic Medical Conditions:    T2DM: Patient on 19 units of Lantus nightly. Starting patient on low-dose siding scale insulin. POCT before meals and at bedtime. Hyperlipidemia: On Lipitor. Neuropathic pain: Continued home Lyrica 150 mg BID. TE fistula s/p treatment of metastatic osteosarcoma. No aspiration events reported. Metastatic Osteosarcoma:  Patient follows up with [facility]. Sathya Samson from [facility]. Previous treatment:  Adriamycin/cisplatin with intermitted high dose methotrexate followed by amputation. Max/CDDP Q21 days x 4 post -op 2/3/15 - 4/7/2015  Current therapy:   Gemcitabine D1,8 + Docetaxel D8 Q21D. Total cycle 21, on cycle 8. C1D1 04/17/2019    Will discuss with attending physician Dr. Miko Perkins. Code Status:Full code  FEN: NPO, patient on thickened liquids diet.    PPX: subQ heparin  DISPO: Deirdre Alegre MD  7/30/2022,  10:06 PM

## 2022-07-31 NOTE — PLAN OF CARE
Problem: Discharge Planning  Goal: Discharge to home or other facility with appropriate resources  Outcome: Progressing  Flowsheets  Taken 7/31/2022 0145  Discharge to home or other facility with appropriate resources:   Identify barriers to discharge with patient and caregiver   Arrange for needed discharge resources and transportation as appropriate  Taken 7/31/2022 0122  Discharge to home or other facility with appropriate resources:   Identify barriers to discharge with patient and caregiver   Arrange for needed discharge resources and transportation as appropriate     Problem: Safety - Adult  Goal: Free from fall injury  Outcome: Progressing

## 2022-07-31 NOTE — PROGRESS NOTES
Progress Note    Admit Date: 7/30/2022  Day: 1  Diet: ADULT DIET; Full Liquid; 4 carb choices (60 gm/meal)    CC: fever    Interval history:   Patient resting comfortably in bed. Not complaining of any symptoms but does have dry cough, crackles on auscultation. Lower extremity feels hot and edematous. Alert, oriented and appropriately answering questions. 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 Bre Pinto [facility], type 2 diabetes mellitus, hyperlipidemia, CKD stage III, HLD who presented to the ED with complaints of a fever. He reports that he checks his temperature daily and decided to come to the ED when he had a temperature of 100.2 today. He also reports feeling weak and had a fall today while shifting himself from the chair to the bed. Review of systems positive for body aches and fever. He denies chills, chest pain, shortness of, abdominal pain, changes in bowel habits, dysuria. Physical exam revealed rhonchorous lungs. Abdomen distended, chronic hernia, nontender. BKA of the left leg. Strikingly, the skin below the right knee extending towards the ankle was markedly warm without any erythema. Patient denies any recent swelling or pain in the leg. Multiple excoriations seen on the skin in the LE. On arrival to the ED, febrile to 100.6, BP 93/53, reportedly tachycardic, was on room air. Work-up in the ED revealing a slight rise in baseline creatinine. Normal leukocyte count as well as normal hemoglobin levels. UA unremarkable for a UTI. VBG normal.  Chest x-ray did not show any acute abnormality. Patient received sepsis bolus as well as Vancomycin and cefepime in the ED. Patient admitted for concern of an infectious etiology behind the weakness and fever. Blood cultures sent, covid swab done prior to admission.  Patient admitted to [facility] for further management. Medications:     Scheduled Meds:   aspirin  81 mg Oral Daily    atorvastatin  20 mg Oral Daily    sodium chloride flush  5-40 mL IntraVENous 2 times per day    heparin (porcine)  5,000 Units SubCUTAneous 3 times per day    cefTRIAXone (ROCEPHIN) IV  1,000 mg IntraVENous Q24H    sodium chloride flush  5-40 mL IntraVENous 2 times per day    insulin lispro  0-4 Units SubCUTAneous TID WC    insulin lispro  0-4 Units SubCUTAneous Nightly     Continuous Infusions:   sodium chloride      sodium chloride 100 mL/hr at 07/31/22 0343    sodium chloride      dextrose       PRN Meds:diphenhydrAMINE, melatonin, sodium chloride flush, sodium chloride, ondansetron **OR** ondansetron, polyethylene glycol, acetaminophen **OR** acetaminophen, sodium chloride flush, sodium chloride, glucose, dextrose bolus **OR** dextrose bolus, glucagon (rDNA), dextrose    Objective:   Vitals:   T-max:  Patient Vitals for the past 8 hrs:   BP Temp Temp src Pulse Resp SpO2 Weight   07/31/22 0633 -- -- -- 63 -- -- --   07/31/22 0609 -- -- -- -- -- -- 210 lb 8.6 oz (95.5 kg)   07/31/22 0604 -- 99.7 °F (37.6 °C) -- -- -- -- --   07/31/22 0339 (!) 92/55 (!) 102.9 °F (39.4 °C) Oral 62 22 92 % --   07/31/22 0103 (!) 97/46 99.9 °F (37.7 °C) Oral 63 20 100 % --   07/31/22 0015 -- 99.6 °F (37.6 °C) Oral -- -- -- --       Intake/Output Summary (Last 24 hours) at 7/31/2022 5100  Last data filed at 7/31/2022 5026  Gross per 24 hour   Intake 340 ml   Output 100 ml   Net 240 ml       Review of Systems    Physical Exam  Constitutional:       General: He is not in acute distress. Appearance: Normal appearance. He is not toxic-appearing. HENT:      Head: Normocephalic and atraumatic. Eyes:      Extraocular Movements: Extraocular movements intact. Pupils: Pupils are equal, round, and reactive to light. Cardiovascular:      Rate and Rhythm: Normal rate and regular rhythm. Pulses: Normal pulses.    Pulmonary: Effort: Pulmonary effort is normal.      Breath sounds: Rales present. Abdominal:      General: Abdomen is flat. Bowel sounds are normal. There is no distension. Tenderness: There is no abdominal tenderness. Musculoskeletal:      Right lower leg: Edema present. Comments: L BKA   Neurological:      Mental Status: He is alert. LABS:    CBC:   Recent Labs     07/30/22 1927 07/31/22 0451   WBC 6.2 4.6   HGB 12.7* 11.3*   HCT 36.3* 32.9*    172   .2* 100.8*     Renal:    Recent Labs     07/30/22 1927 07/31/22 0451    137   K 4.7 4.4    102   CO2 24 24   BUN 33* 36*   CREATININE 2.8* 2.9*   GLUCOSE 125* 105*   CALCIUM 8.8 8.4   PHOS  --  3.2   ANIONGAP 11 11     Hepatic:   Recent Labs     07/30/22 1927 07/31/22 0451   AST 22 38*   ALT 26 28   BILITOT 0.6 0.6   PROT 6.5 5.4*   LABALBU 4.2 3.7   ALKPHOS 73 63     Troponin:   Recent Labs     07/30/22 1927   TROPONINI <0.01     BNP: No results for input(s): BNP in the last 72 hours. Lipids: No results for input(s): CHOL, HDL in the last 72 hours. Invalid input(s): LDLCALCU, TRIGLYCERIDE  ABGs:  No results for input(s): PHART, OWK6ANB, PO2ART, VQQ2SHX, BEART, THGBART, Q5PVWITM, MLS5HZH in the last 72 hours. INR: No results for input(s): INR in the last 72 hours. Lactate: No results for input(s): LACTATE in the last 72 hours. Cultures:  -----------------------------------------------------------------  RAD:   XR CHEST PORTABLE   Final Result   1. No acute disease. VL Extremity Venous Right    (Results Pending)       Assessment/Plan:       Sepsis 2/2 suspected COVID PNA vs cellulitis  Patient met SIRS criterion on arrival. Febrile and hypotensive, lactic acid normal.  Patient with symptoms of cough, weakness, body aches, concerning for a suspected respiratory infection. S/p sepsis bolus, Vancomycin and Cefepime in the ED.   -follow Bcx2, procalcitonin mildly elevated at 0.3  -supplemental O2 as needed, currently saturating well on RA  -initiate decadron if hypoxia develops  -IVF  -Ceftaroline (7/31)       CKD IV  History of AIN from [facility] [place]  S/p sepsis bolus in the ED. Continue fluids. Patient follows up with [facility] nephrology outpatient.  -monitor Cr, UOP  -avoid nephrotoxin agents       Chronic Medical Conditions:     T2DM: Patient on 19 units of Lantus nightly. Starting patient on low-dose siding scale insulin. POCT before meals and at bedtime. Hyperlipidemia: On Lipitor. Neuropathic pain: Continued home Lyrica 150 mg BID. TE fistula s/p treatment of metastatic osteosarcoma. No aspiration events reported. Metastatic Osteosarcoma:  Patient follows up with Dr. Sonu Butterfield from [facility]. Previous treatment:  Adriamycin/cisplatin with intermitted high dose methotrexate followed by amputation. Max/CDDP Q21 days x 4 post -op 2/3/15 - 4/7/2015  Current therapy:  Gemcitabine D1,8 + Docetaxel D8 Q21D. Total cycle 21, on cycle 8. C1D1 04/17/2019     Will discuss with attending physician Dr. Trent Luu.      Code Status: Full  FEN: thick liquids  PPX: subq heparin  DISPO: Italia Hanley MD, PGY-2  07/31/22  8:11 AM    This patient has been staffed and discussed with Eleonora Smith MD.

## 2022-07-31 NOTE — CONSULTS
[facility]    [facility]              [phone]                       Plan :     Patient's last ultrasound was negative. Blood pressures are holding steady. Continue with gentle IV fluids. At this point in time we will hold all antihypertensives. Will let the patient's blood pressure go up. Encourage oral intake. May have to switch the fluids to Plasma-Lyte if the bicarb with potassium subsequently a problem. Treatment of underlying infection. Assessment :     Acute on chronic kidney disease stage IV:  Baseline creatinine has been around 2.4. Creatinine at the time of admission was 2.9. Patient's chronic kidney disease worse thought secondary to underlying diabetes with progressive disease along with Immunotherapy and previous diuretic use  Acute decompensation is likely related to relative hypotension    Hypertension:  [place] control at this time           [facility] would like to thank Rolando Mcgowan MD   for opportunity to serve this patient      Please call with questions at-   24 Hrs Answering service [phone] or  7 am- 5 pm via Perfect serve or cell phone  Mary Jane Santana MD          CC/reason for consult :     MILEY/CKD     HPI :     Debora Dominguez is a 66 y.o. male ppast medical history significant for diabetes, chondroblastic osteosarcoma of left lower extremity s/p BKA in the past along with history of metastatic disease to lung and head and neck previously exposed to [facility] by [facility] ER along with history of some degree of chronic kidney disease in the past does not follow with a nephrologist there is some paucity of labs between 2020 and 2022 with 1 labs in the Care Everywhere was admitted in June 2022 with swelling and was found to have significant edema. Along with worsening kidney function. Patient was discharged with outpatient follow-up he did not make it to any of his outpatient follow-ups.   Presented to the hospital again this time with fever. Patient's temperature was running around 120s. Onset of the symptoms several progressively got worse associated with significant weakness and a fall. Nothing was making the symptoms better or worse. At the time of presentation to emergency room he was found to have hypotension with fever along with tachycardia. Was admitted to hospital for further evaluation and management. He was also found to have worsening Creatinine  Presents to the hospital again this time with significant chronic kidney disease with mild worsening of the kidney function. Given the patient's chronic kidney disease with worsening creatinine nephrology is been consulted to assist with care     Interval History:     - feels ok     ROS:     Seen with- no family in the room      positives in bold   Constitutional:  fever, chills, weakness, weight change, fatigue  Skin:  rash, pruritus, hair loss, bruising, dry skin, petechiae  Head, Face, Neck   headaches, swelling,  cervical adenopathy  Respiratory: shortness of breath, cough, or wheezing  Cardiovascular: chest pain, palpitations, dizzy, edema  Gastrointestinal: nausea, vomiting, diarrhea, constipation,belly pain    Yellow skin, blood in stool  Musculoskeletal:  back pain, muscle weakness, gait problems,       joint pain or swelling. Genitourinary:  dysuria, poor urine flow, flank pain, blood in urine  Neurologic:  vertigo, TIA'S, syncope, seizures, focal weakness  Psychosocial:  insomnia, anxiety, or depression. Additional positive findings:                          All other remaining systems are negative.         PMH/PSH/SH/Family History:     Past Medical History:   Diagnosis Date    Arthritis     left knee and hands bilateral    Chondroblastic osteosarcoma ([facility] [address] 09/29/2014    Left distal tibia    Diabetes mellitus ([facility] [address]     type 2 1999    Hyperlipidemia     Unspecified cerebral artery occlusion with cerebral infarction 01/01/2008       Past Surgical History:   Procedure Laterality Date    ABDOMEN SURGERY      CHOLECYSTECTOMY      CHOLECYSTECTOMY      Laparoscopic    COLONOSCOPY      COLONOSCOPY      X3.  Last one 2014    ENDOSCOPY, COLON, DIAGNOSTIC      Upper GI prior to Cholecystectomy    FOREIGN BODY REMOVAL      LEG AMPUTATION BELOW KNEE Left 14    LEG SURGERY Left 14    biopsy left lower leg    LIPOMA RESECTION      OTHER SURGICAL HISTORY Left 10/22/2014    LEFT SUBCLAVIAN PORT REMOVAL              OTHER SURGICAL HISTORY Right 10/27/14    INSERTION OF SINGLE LUMEN PORT A CATH    TONSILLECTOMY      TONSILLECTOMY      childhood    TUNNELED VENOUS PORT PLACEMENT Right     Power Port       Social History     Socioeconomic History    Marital status: Single     Spouse name: Not on file    Number of children: Not on file    Years of education: Not on file    Highest education level: Not on file   Occupational History    Not on file   Tobacco Use    Smoking status: Former     Packs/day: 1.00     Years: 12.00     Pack years: 12.00     Types: Cigarettes     Quit date: 1982     Years since quittin.6    Smokeless tobacco: Never    Tobacco comments:     quit    Substance and Sexual Activity    Alcohol use: Yes     Comment: beer daily    Drug use: No    Sexual activity: Not Currently   Other Topics Concern    Not on file   Social History Narrative    ** Merged History Encounter **          Social Determinants of Health     Financial Resource Strain: Not on file   Food Insecurity: Not on file   Transportation Needs: Not on file   Physical Activity: Not on file   Stress: Not on file   Social Connections: Not on file   Intimate Partner Violence: Not on file   Housing Stability: Not on file           Problem Relation Age of Onset    Emphysema Mother     Other Brother           Medication:      aspirin  81 mg Oral Daily    atorvastatin  20 mg Oral Daily    sodium chloride flush  5-40 mL IntraVENous 2 times per day    heparin (porcine)  5,000 Units SubCUTAneous 3 times per day    ceftaroline fosamil (TEFLARO) IVPB  300 mg IntraVENous Q12H    sodium chloride flush  5-40 mL IntraVENous 2 times per day    insulin lispro  0-4 Units SubCUTAneous TID WC    insulin lispro  0-4 Units SubCUTAneous Nightly     diphenhydrAMINE, melatonin, sodium chloride flush, sodium chloride, ondansetron **OR** ondansetron, polyethylene glycol, acetaminophen **OR** acetaminophen, sodium chloride flush, sodium chloride, glucose, dextrose bolus **OR** dextrose bolus, glucagon (rDNA), dextrose       Vitals :     BP (!) 93/51   Pulse 58   Temp (!) 100.8 °F (38.2 °C) (Oral)   Resp 18   Ht 6' (1.829 m)   Wt 210 lb 8.6 oz (95.5 kg)   SpO2 100%   BMI 28.55 kg/m²     I & O :       Intake/Output Summary (Last 24 hours) at 7/31/2022 1453  Last data filed at 7/31/2022 1202  Gross per 24 hour   Intake 340 ml   Output 375 ml   Net -35 ml        Physical Examination :     General appearance: Anxious- no, distressed- no, in good spirits- yes  HEENT: Lips- normal, teeth- ok , oral mucosa- moist  Neck : Mass- no, appears symmetrical, JVD- not visible  Respiratory: Respiratory effort- , wheeze- no, crackles - no  Cardiovascular:  Ausculation- S1S2, Edema positive   Abdomen: visible mass- no, distention- no, scar- no, tenderness- no                            hepatosplenomegaly-  no  Musculoskeletal:  clubbing no,cyanosis- no , digital ischemia- no                           muscle strength- grossly normal , tone - grossly normal  Skin: rashes- no , ulcers- no, induration- no, tightening - no  Psychiatric:  mood stable effect normal      LABS:     Recent Labs     07/30/22 1927 07/31/22  0451   WBC 6.2 4.6   HGB 12.7* 11.3*   HCT 36.3* 32.9*    172     Recent Labs     07/30/22 1927 07/31/22  0451    137   K 4.7 4.4  4.4    102   CO2 24 24   BUN 33* 36*   CREATININE 2.8* 2.9*   GLUCOSE 125* 105*   PHOS  --  3.2

## 2022-07-31 NOTE — PROGRESS NOTES
4 Eyes Admission Assessment     I agree as the admission nurse that 2 RN's have performed a thorough Head to Toe Skin Assessment on the patient. ALL assessment sites listed below have been assessed on admission. Areas assessed by both nurses:   [x]   Head, Face, and Ears   [x]   Shoulders, Back, and Chest  [x]   Arms, Elbows, and Hands   [x]   Coccyx, Sacrum, and Ischium  [x]   Legs, Feet, and Heels        Does the Patient have Skin Breakdown?   No         Eddie Prevention initiated:  No   Wound Care Orders initiated:  No      [facility] nurse consulted for Pressure Injury (Stage 3,4, Unstageable, DTI, NWPT, and Complex wounds) or Eddie score 18 or lower:  No      Nurse 1 eSignature: Electronically signed by Norma Dee RN on 7/31/22 at 1:12 AM EDT    **SHARE this note so that the co-signing nurse is able to place an eSignature**    Nurse 2 eSignature: Electronically signed by Luly Rushing RN on 7/31/22 at 12:58 AM EDT

## 2022-07-31 NOTE — PROGRESS NOTES
Speech Language Pathology  Facility/Department: [place] [id number] [facility]   CLINICAL BEDSIDE SWALLOW EVALUATION / Discharge    NAME: Kendal Henderson  :   MRN: [id number]    ADMISSION DATE: 2022  ADMITTING DIAGNOSIS: has Chondroblastic osteosarcoma ([facility] Micturition syncope; Fever and chills; Diabetes ([facility] Left BKA; Port-A-Cath in place; Cellulitis; Stage 3b chronic kidney disease ([facility] and Sepsis ([facility] on their problem list.  ONSET DATE: h/o dysphagia since     Recent Chest Xray/CT of Chest: (22)  1. No acute disease. Date of Eval: 2022  Evaluating Therapist: Lindi Goltz, SLP    Current Diet level:  Current Diet :  (full liquid)      Primary Complaint  Patient Complaint: None re: dysphagia, endorsed swallow at baseline    Pain:  Intermittent shooting pains, resolved without intervention    Reason for Referral  Kendal Henderson was referred for a bedside swallow evaluation to assess the efficiency of his swallow function, identify signs and symptoms of aspiration and make recommendations regarding safe dietary consistencies, effective compensatory strategies, and safe eating environment. 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 Pt on mildly thick liquids at baseline but endorsed drinking some thin liquids; utilizes chin tuck as needed. Oral phase grossly WFL for textures analyzed. Pt with intermittent coughing with liquids. Recommend regular consistency + mildly thick liquids. Pt would benefit from repeat instrumental swallow evaluation. As pt's dysphagia is chronic and no acute changes reported by pt, recommend pt complete FEES or MBS as OP with treating SLP. No further acute SLP needs at this time.  Pt is at increased aspiration PNA risk given dysphagia and current infection - it is imperative pt maintains yet as had to cxl appt. Pt denies any PNA hx.  Consistencies Administered: Regular;Mildly Thick - cup; Thin - cup;Pureed           Vision/Hearing  Vision  Vision: Within Functional Limits  Hearing  Hearing: Within functional limits    Oral Motor Deficits  Oral/Motor  Oral Hygiene: Moist;Clean  WFL    Oral Phase Dysfunction  Oral Phase  Oral Phase - Comment: WFL     Indicators of Pharyngeal Phase Dysfunction   Pharyngeal Phase   Pharyngeal Phase: Known dysfunction; coughing with liquids    Prognosis  Individuals consulted  Consulted and agree with results and recommendations: Patient;RN  RN Name: Diya Denise    Education  Patient Education: Role of SLP, purpose of visit, increased aspiration PNA risk, importance of oral care with recommended method and frequency, need for repeat instrumental evaluation, and rec to f/u with OP SLP. Pt endorsed understanding and reported desire to f/u with treating SLP vs completing repeat instrumental evaluation during IP stay. Patient Education Response: Verbalizes understanding  Safety Devices in place: Yes  Type of devices:  All fall risk precautions in place       Therapy Time  SLP Individual Minutes  Time In: 1000  Time Out: 1010  Minutes: 10  SLP Co-Treatment Minutes  Time In: 0000  Time Out: 0000  Minutes: 0  SLP Total Treatment Time  Timed Code Treatment Minutes: 0 Minutes  Total Treatment Time: [address] [place] Samantha Quintanilla MA CCC-SLP; [id number]  Speech-Language Pathologist  Pager # [phone]  Phone # [phone]  Office # [phone]

## 2022-08-01 ENCOUNTER — APPOINTMENT (OUTPATIENT)
Dept: CT IMAGING | Age: 72
DRG: 853 | End: 2022-08-01
Payer: OTHER GOVERNMENT

## 2022-08-01 ENCOUNTER — APPOINTMENT (OUTPATIENT)
Dept: VASCULAR LAB | Age: 72
DRG: 853 | End: 2022-08-01
Payer: OTHER GOVERNMENT

## 2022-08-01 LAB
ALBUMIN SERPL-MCNC: 3.2 G/DL (ref 3.4–5)
ANION GAP SERPL CALCULATED.3IONS-SCNC: 14 MMOL/L (ref 3–16)
BASOPHILS ABSOLUTE: 0 K/UL (ref 0–0.2)
BASOPHILS RELATIVE PERCENT: 0.1 %
BUN BLDV-MCNC: 44 MG/DL (ref 7–20)
C DIFF TOXIN/ANTIGEN: NORMAL
CALCIUM SERPL-MCNC: 8 MG/DL (ref 8.3–10.6)
CHLORIDE BLD-SCNC: 105 MMOL/L (ref 99–110)
CO2: 19 MMOL/L (ref 21–32)
CREAT SERPL-MCNC: 3.5 MG/DL (ref 0.8–1.3)
EOSINOPHILS ABSOLUTE: 0 K/UL (ref 0–0.6)
EOSINOPHILS RELATIVE PERCENT: 0 %
GFR AFRICAN AMERICAN: 21
GFR NON-AFRICAN AMERICAN: 17
GLUCOSE BLD-MCNC: 126 MG/DL (ref 70–99)
GLUCOSE BLD-MCNC: 130 MG/DL (ref 70–99)
GLUCOSE BLD-MCNC: 139 MG/DL (ref 70–99)
GLUCOSE BLD-MCNC: 152 MG/DL (ref 70–99)
GLUCOSE BLD-MCNC: 164 MG/DL (ref 70–99)
HCT VFR BLD CALC: 32.2 % (ref 40.5–52.5)
HEMOGLOBIN: 11.3 G/DL (ref 13.5–17.5)
INR BLD: 1.23 (ref 0.87–1.14)
LYMPHOCYTES ABSOLUTE: 0.4 K/UL (ref 1–5.1)
LYMPHOCYTES RELATIVE PERCENT: 7.2 %
MCH RBC QN AUTO: 35.5 PG (ref 26–34)
MCHC RBC AUTO-ENTMCNC: 35 G/DL (ref 31–36)
MCV RBC AUTO: 101.6 FL (ref 80–100)
MONOCYTES ABSOLUTE: 0.5 K/UL (ref 0–1.3)
MONOCYTES RELATIVE PERCENT: 8.1 %
NEUTROPHILS ABSOLUTE: 5.2 K/UL (ref 1.7–7.7)
NEUTROPHILS RELATIVE PERCENT: 84.6 %
PDW BLD-RTO: 13.8 % (ref 12.4–15.4)
PERFORMED ON: ABNORMAL
PHOSPHORUS: 3.6 MG/DL (ref 2.5–4.9)
PLATELET # BLD: 145 K/UL (ref 135–450)
PMV BLD AUTO: 8.1 FL (ref 5–10.5)
POTASSIUM SERPL-SCNC: 3.9 MMOL/L (ref 3.5–5.1)
PROTHROMBIN TIME: 15.4 SEC (ref 11.7–14.5)
RBC # BLD: 3.17 M/UL (ref 4.2–5.9)
SODIUM BLD-SCNC: 138 MMOL/L (ref 136–145)
WBC # BLD: 6.1 K/UL (ref 4–11)
WHITE BLOOD CELLS (WBC), STOOL: ABNORMAL

## 2022-08-01 PROCEDURE — 97530 THERAPEUTIC ACTIVITIES: CPT

## 2022-08-01 PROCEDURE — 2580000003 HC RX 258: Performed by: INTERNAL MEDICINE

## 2022-08-01 PROCEDURE — 6360000002 HC RX W HCPCS

## 2022-08-01 PROCEDURE — 2580000003 HC RX 258: Performed by: STUDENT IN AN ORGANIZED HEALTH CARE EDUCATION/TRAINING PROGRAM

## 2022-08-01 PROCEDURE — 71250 CT THORAX DX C-: CPT

## 2022-08-01 PROCEDURE — 87449 NOS EACH ORGANISM AG IA: CPT

## 2022-08-01 PROCEDURE — 36415 COLL VENOUS BLD VENIPUNCTURE: CPT

## 2022-08-01 PROCEDURE — 80053 COMPREHEN METABOLIC PANEL: CPT

## 2022-08-01 PROCEDURE — 6370000000 HC RX 637 (ALT 250 FOR IP): Performed by: INTERNAL MEDICINE

## 2022-08-01 PROCEDURE — 87324 CLOSTRIDIUM AG IA: CPT

## 2022-08-01 PROCEDURE — 1200000000 HC SEMI PRIVATE

## 2022-08-01 PROCEDURE — 87340 HEPATITIS B SURFACE AG IA: CPT

## 2022-08-01 PROCEDURE — 2580000003 HC RX 258

## 2022-08-01 PROCEDURE — 87641 MR-STAPH DNA AMP PROBE: CPT

## 2022-08-01 PROCEDURE — 87506 IADNA-DNA/RNA PROBE TQ 6-11: CPT

## 2022-08-01 PROCEDURE — 82533 TOTAL CORTISOL: CPT

## 2022-08-01 PROCEDURE — 6360000002 HC RX W HCPCS: Performed by: INTERNAL MEDICINE

## 2022-08-01 PROCEDURE — 83630 LACTOFERRIN FECAL (QUAL): CPT

## 2022-08-01 PROCEDURE — 6370000000 HC RX 637 (ALT 250 FOR IP)

## 2022-08-01 PROCEDURE — 93005 ELECTROCARDIOGRAM TRACING: CPT | Performed by: STUDENT IN AN ORGANIZED HEALTH CARE EDUCATION/TRAINING PROGRAM

## 2022-08-01 PROCEDURE — 85025 COMPLETE CBC W/AUTO DIFF WBC: CPT

## 2022-08-01 PROCEDURE — 93971 EXTREMITY STUDY: CPT

## 2022-08-01 PROCEDURE — 97535 SELF CARE MNGMENT TRAINING: CPT

## 2022-08-01 PROCEDURE — 97166 OT EVAL MOD COMPLEX 45 MIN: CPT

## 2022-08-01 PROCEDURE — 97162 PT EVAL MOD COMPLEX 30 MIN: CPT

## 2022-08-01 PROCEDURE — 85610 PROTHROMBIN TIME: CPT

## 2022-08-01 RX ORDER — MIDODRINE HYDROCHLORIDE 5 MG/1
10 TABLET ORAL
Status: DISCONTINUED | OUTPATIENT
Start: 2022-08-01 | End: 2022-08-02

## 2022-08-01 RX ORDER — SODIUM CHLORIDE, SODIUM GLUCONATE, SODIUM ACETATE, POTASSIUM CHLORIDE AND MAGNESIUM CHLORIDE 526; 502; 368; 37; 30 MG/100ML; MG/100ML; MG/100ML; MG/100ML; MG/100ML
INJECTION, SOLUTION INTRAVENOUS CONTINUOUS
Status: DISCONTINUED | OUTPATIENT
Start: 2022-08-01 | End: 2022-08-02

## 2022-08-01 RX ADMIN — SODIUM CHLORIDE 30 ML: 9 INJECTION, SOLUTION INTRAVENOUS at 19:10

## 2022-08-01 RX ADMIN — AZITHROMYCIN DIHYDRATE 500 MG: 500 INJECTION, POWDER, LYOPHILIZED, FOR SOLUTION INTRAVENOUS at 19:12

## 2022-08-01 RX ADMIN — SODIUM CHLORIDE, PRESERVATIVE FREE 10 ML: 5 INJECTION INTRAVENOUS at 08:31

## 2022-08-01 RX ADMIN — ASPIRIN 81 MG: 81 TABLET, COATED ORAL at 08:30

## 2022-08-01 RX ADMIN — SODIUM CHLORIDE: 9 INJECTION, SOLUTION INTRAVENOUS at 05:34

## 2022-08-01 RX ADMIN — MIDODRINE HYDROCHLORIDE 10 MG: 5 TABLET ORAL at 14:45

## 2022-08-01 RX ADMIN — BENZONATATE 100 MG: 100 CAPSULE ORAL at 08:30

## 2022-08-01 RX ADMIN — SODIUM CHLORIDE, PRESERVATIVE FREE 10 ML: 5 INJECTION INTRAVENOUS at 20:50

## 2022-08-01 RX ADMIN — SODIUM CHLORIDE 10 ML: 9 INJECTION, SOLUTION INTRAVENOUS at 22:46

## 2022-08-01 RX ADMIN — CEFTAROLINE FOSAMIL 300 MG: 600 POWDER, FOR SOLUTION INTRAVENOUS at 22:47

## 2022-08-01 RX ADMIN — HEPARIN SODIUM 5000 UNITS: 5000 INJECTION INTRAVENOUS; SUBCUTANEOUS at 14:46

## 2022-08-01 RX ADMIN — ATORVASTATIN CALCIUM 20 MG: 20 TABLET, FILM COATED ORAL at 08:30

## 2022-08-01 RX ADMIN — HEPARIN SODIUM 5000 UNITS: 5000 INJECTION INTRAVENOUS; SUBCUTANEOUS at 22:36

## 2022-08-01 RX ADMIN — SODIUM CHLORIDE, SODIUM GLUCONATE, SODIUM ACETATE, POTASSIUM CHLORIDE AND MAGNESIUM CHLORIDE: 526; 502; 368; 37; 30 INJECTION, SOLUTION INTRAVENOUS at 14:26

## 2022-08-01 RX ADMIN — HEPARIN SODIUM 5000 UNITS: 5000 INJECTION INTRAVENOUS; SUBCUTANEOUS at 05:35

## 2022-08-01 RX ADMIN — MIDODRINE HYDROCHLORIDE 10 MG: 5 TABLET ORAL at 19:05

## 2022-08-01 RX ADMIN — BENZONATATE 100 MG: 100 CAPSULE ORAL at 00:09

## 2022-08-01 RX ADMIN — CEFTAROLINE FOSAMIL 300 MG: 600 POWDER, FOR SOLUTION INTRAVENOUS at 11:45

## 2022-08-01 ASSESSMENT — PAIN - FUNCTIONAL ASSESSMENT
PAIN_FUNCTIONAL_ASSESSMENT: PREVENTS OR INTERFERES SOME ACTIVE ACTIVITIES AND ADLS
PAIN_FUNCTIONAL_ASSESSMENT: PREVENTS OR INTERFERES SOME ACTIVE ACTIVITIES AND ADLS

## 2022-08-01 ASSESSMENT — PAIN DESCRIPTION - DESCRIPTORS
DESCRIPTORS: SHOOTING;OTHER (COMMENT)
DESCRIPTORS: ACHING

## 2022-08-01 ASSESSMENT — PAIN SCALES - GENERAL
PAINLEVEL_OUTOF10: 3
PAINLEVEL_OUTOF10: 0
PAINLEVEL_OUTOF10: 0
PAINLEVEL_OUTOF10: 4
PAINLEVEL_OUTOF10: 0
PAINLEVEL_OUTOF10: 0

## 2022-08-01 ASSESSMENT — PAIN DESCRIPTION - LOCATION
LOCATION: BACK
LOCATION: LEG

## 2022-08-01 ASSESSMENT — PAIN DESCRIPTION - FREQUENCY
FREQUENCY: INTERMITTENT
FREQUENCY: INTERMITTENT

## 2022-08-01 ASSESSMENT — PAIN DESCRIPTION - ORIENTATION
ORIENTATION: LEFT
ORIENTATION: LOWER

## 2022-08-01 NOTE — PROGRESS NOTES
[facility]    [facility] [place] [facility]              [phone]                       Plan :     And has now peaked to 3.5  Was 2.9 on admission  Blood pressure low at 91 systolic  Made at least 251 mL of urine yesterday  Bicarbonate slightly low at 19 on NS  No edema  No uremia  BP still low      Change fluids to Plasma-Lyte  Meds reviewed  Risk of needing dialysis this admission explained  Check hep B   Start midodrine to help BP - which may help kidneys, can cause   Urinary retention,monitor for that    Dr King Ambrocio will be covering beginning 8/2/22  Asked to check labs early am             Assessment :     Acute on chronic kidney disease stage IV:  Baseline creatinine has been around 2.4. Creatinine at the time of admission was 2.9.   Patient's chronic kidney disease worse thought secondary to underlying diabetes with progressive disease along with Immunotherapy and previous diuretic use  Acute decompensation is likely related to relative hypotension    Has a history of AIN from [place]    Hypertension:  [place] control at this time    Type 2 diabetes mellitus  Metastatic osteosarcoma  Neuropathy      [facility] would like to thank Katina Flannery MD   for opportunity to serve this patient      Please call with questions at-   24 Hrs Answering service [phone] or  7 am- 5 pm via Perfect serve or cell phone  Lucie Marsh MD          CC/reason for consult :     MILEY/CKD     HPI :     Miriam Madrid is a 66 y.o. male ppast medical history significant for diabetes, chondroblastic osteosarcoma of left lower extremity s/p BKA in the past along with history of metastatic disease to lung and head and neck previously exposed to [place] by [facility] along with history of some degree of chronic kidney disease in the past does not follow with a nephrologist there is some paucity of labs between 2020 and 2022 with 1 labs in the [place] was admitted in June 2022 with swelling and was found to have significant edema. Along with worsening kidney function. Patient was discharged with outpatient follow-up he did not make it to any of his outpatient follow-ups. Presented to the hospital again this time with fever. Patient's temperature was running around 120s. Onset of the symptoms several progressively got worse associated with significant weakness and a fall. Nothing was making the symptoms better or worse. At the time of presentation to emergency room he was found to have hypotension with fever along with tachycardia. Was admitted to hospital for further evaluation and management. He was also found to have worsening Creatinine  Presents to the hospital again this time with significant chronic kidney disease with mild worsening of the kidney function. Given the patient's chronic kidney disease with worsening creatinine nephrology is been consulted to assist with care     Interval History:     - feels ok     ROS:     Seen with- no family in the room      positives in bold   Constitutional:  fever, chills, weakness, weight change, fatigue  Skin:  rash, pruritus, hair loss, bruising, dry skin, petechiae  Head, Face, Neck   headaches, swelling,  cervical adenopathy  Respiratory: shortness of breath, cough, or wheezing  Cardiovascular: chest pain, palpitations, dizzy, edema  Gastrointestinal: nausea, vomiting, diarrhea, constipation,belly pain    Yellow skin, blood in stool  Musculoskeletal:  back pain, muscle weakness, gait problems,       joint pain or swelling. Genitourinary:  dysuria, poor urine flow, flank pain, blood in urine  Neurologic:  vertigo, TIA'S, syncope, seizures, focal weakness  Psychosocial:  insomnia, anxiety, or depression. Additional positive findings:                          All other remaining systems are negative.         PMH/PSH/SH/Family History:     Past Medical History:   Diagnosis Date    Arthritis     left knee and hands bilateral    Chondroblastic osteosarcoma ([facility] 2014    Left distal tibia    [facility]     type 2 1999    Hyperlipidemia     Unspecified cerebral artery occlusion with cerebral infarction 2008       Past Surgical History:   Procedure Laterality Date    ABDOMEN SURGERY      CHOLECYSTECTOMY      CHOLECYSTECTOMY      Laparoscopic    COLONOSCOPY      COLONOSCOPY      X3.  Last one 2014    ENDOSCOPY, COLON, DIAGNOSTIC      Upper GI prior to Cholecystectomy    FOREIGN BODY REMOVAL      LEG AMPUTATION BELOW KNEE Left 14    LEG SURGERY Left 14    biopsy left lower leg    LIPOMA RESECTION      OTHER SURGICAL HISTORY Left 10/22/2014    LEFT SUBCLAVIAN PORT REMOVAL              OTHER SURGICAL HISTORY Right 10/27/14    INSERTION OF SINGLE LUMEN PORT A CATH    TONSILLECTOMY      TONSILLECTOMY      childhood    TUNNELED VENOUS PORT PLACEMENT Right     Power Port       Social History     Socioeconomic History    Marital status: Single     Spouse name: Not on file    Number of children: Not on file    Years of education: Not on file    Highest education level: Not on file   Occupational History    Not on file   Tobacco Use    Smoking status: Former     Packs/day: 1.00     Years: 12.00     Pack years: 12.00     Types: Cigarettes     Quit date: 1982     Years since quittin.6    Smokeless tobacco: Never    Tobacco comments:     quit    Substance and Sexual Activity    Alcohol use: Yes     Comment: beer daily    Drug use: No    Sexual activity: Not Currently   Other Topics Concern    Not on file   Social History Narrative    ** Merged History Encounter **          Social Determinants of Health     Financial Resource Strain: Not on file   Food Insecurity: Not on file   Transportation Needs: Not on file   Physical Activity: Not on file   Stress: Not on file   Social Connections: Not on file   Intimate Partner Violence: Not on file   Housing Stability: Not on file           Problem Relation Age of Onset Emphysema Mother     Other Brother           Medication:      aspirin  81 mg Oral Daily    atorvastatin  20 mg Oral Daily    sodium chloride flush  5-40 mL IntraVENous 2 times per day    heparin (porcine)  5,000 Units SubCUTAneous 3 times per day    ceftaroline fosamil (TEFLARO) IVPB  300 mg IntraVENous Q12H    sodium chloride flush  5-40 mL IntraVENous 2 times per day    insulin lispro  0-4 Units SubCUTAneous TID WC    insulin lispro  0-4 Units SubCUTAneous Nightly     diphenhydrAMINE, melatonin, sodium chloride flush, sodium chloride, ondansetron **OR** ondansetron, polyethylene glycol, acetaminophen **OR** acetaminophen, benzonatate, sodium chloride flush, sodium chloride, glucose, dextrose bolus **OR** dextrose bolus, glucagon (rDNA), dextrose       Vitals :     BP (!) 91/51   Pulse 55   Temp 99.7 °F (37.6 °C) (Oral)   Resp 16   Ht 6' (1.829 m)   Wt 215 lb 13.3 oz (97.9 kg)   SpO2 93%   BMI 29.27 kg/m²     I & O :       Intake/Output Summary (Last 24 hours) at 8/1/2022 0755  Last data filed at 8/1/2022 1737  Gross per 24 hour   Intake 360 ml   Output 775 ml   Net -415 ml          Physical Examination :     General appearance: Anxious- no, distressed- no, in good spirits- yes  HEENT: Lips- normal, teeth- ok , oral mucosa- moist  Neck : Mass- no, appears symmetrical, JVD- not visible  Respiratory: Respiratory effort- , wheeze- no, crackles - no  Cardiovascular:  Ausculation- S1S2, Edema positive   Abdomen: visible mass- no, distention- no, scar- no, tenderness- no                            hepatosplenomegaly-  no  Musculoskeletal:  clubbing no,cyanosis- no , digital ischemia- no                           muscle strength- grossly normal , tone - grossly normal  Right BKA   Skin: rashes- no , ulcers- no, induration- no, tightening - no  Psychiatric:  mood stable effect normal      LABS:     Recent Labs     07/30/22  1927 07/31/22  0451 08/01/22  0440   WBC 6.2 4.6 6.1   HGB 12.7* 11.3* 11.3*   HCT 36.3* 32.9* 32.2*    172 145       Recent Labs     07/30/22  1927 07/31/22  0451 08/01/22  0440    137 138   K 4.7 4.4  4.4 3.9    102 105   CO2 24 24 19*   BUN 33* 36* 44*   CREATININE 2.8* 2.9* 3.5*   GLUCOSE 125* 105* 139*   PHOS  --  3.2 3.6

## 2022-08-01 NOTE — FLOWSHEET NOTE
08/01/22 1449   Encounter Summary   Encounter Overview/Reason  Attempted Encounter  (I will return to the room.  Staff was in the room.)

## 2022-08-01 NOTE — PROGRESS NOTES
Physical Therapy  Facility/Department: [place] [address] [facility]  Physical Therapy Initial Assessment and Treatment    Name: Anna Vera  :   MRN: [id number]  Date of Service: 2022    Discharge Recommendations:    Anna Vera scored a 9/24 on the AM-PAC short mobility form. Current research shows that an AM-PAC score of 17 or less is typically not associated with a discharge to the patient's home setting. Based on the patient's AM-PAC score and their current functional mobility deficits, it is recommended that the patient have 5-7 sessions per week of Physical Therapy at d/c to increase the patient's independence. At this time, this patient demonstrates complex nursing, medical, and rehabilitative needs, and would benefit from intensive rehabilitation services upon discharge from the Inpatient setting. This patient demonstrates the ability to participate in and benefit from an intensive therapy program with a coordinated interdisciplinary team approach to foster frequent, structured, and documented communication among disciplines, who will work together to establish, prioritize, and achieve treatment goals. Please see assessment section for further patient specific details. If patient discharges prior to next session this note will serve as a discharge summary. Please see below for the latest assessment towards goals. PT Equipment Recommendations  Equipment Needed: No      Patient Diagnosis(es): The encounter diagnosis was Septicemia ([facility] Past Medical History:  has a past medical history of Arthritis, Chondroblastic osteosarcoma ([facility] Diabetes mellitus ([facility] Hyperlipidemia, and Unspecified cerebral artery occlusion with cerebral infarction. 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 Assessment   Assessment: Pt is normally independent at home with ADL, self care, WC mobility and transfers. Currently needs A x 2 for bed to chair. Recommend continued therapies to maximize mobility and safety, independence  Treatment Diagnosis: Decreased functional mobility following admission for Septicemia  Barriers to Learning: none noted  Requires PT Follow-Up: Yes  Activity Tolerance  Activity Tolerance: Patient limited by endurance     Plan   Plan  Plan:  (2-5)  Current Treatment Recommendations: Strengthening, ROM, Functional mobility training, Transfer training, Safety education & training  Safety Devices  Type of Devices: Call light within reach, Chair alarm in place, Nurse notified, Left in chair     Restrictions  Position Activity Restriction  Other position/activity restrictions: up with assist, droplet+ isolation     Subjective   General  Additional Pertinent Hx: Adm 7/30 with fever, CXR negative for acute disease  Referring Practitioner: Francis Tate  Diagnosis: Septicemia  Subjective  Subjective: Pt in bed upon PT entry, agreeable to working with PT           Social/Functional History  Social/Functional History  Lives With: Alone  Type of Home: Apartment  Home Layout:  (laundry in unit)  Home Access: Stairs to enter with rails  Entrance Stairs - Number of Steps: 3 steps with benson rails, then at ground level apartment.   Entrance Stairs - Rails: Both  Bathroom Shower/Tub: Tub/Shower unit  Bathroom Toilet: Handicap height  Bathroom Equipment: Shower chair, Toilet raiser, Grab bars around toilet  Bathroom Accessibility: Accessible  Home Equipment: Walker, rolling, [employer], [address], Wheelchair-manual  Has the patient had two or more falls in the past year or any fall with injury in the past year?: Yes (slid out of WC recently, but no other falls, no injury noted)  ADL Assistance: Independent (able to dress, bathe and toilet)  Homemaking Assistance: Independent (indep cooking cleaning, laundry)  Ambulation Assistance: Non-ambulatory (before leg got swollen, he used prosthesis and cane - last walking about 2 months ago. Has used wheelchair these past two months.)  Transfer Assistance: Independent  Active : Yes (normally does own grocery shopping - these past few months has had groceries delivered)  Occupation: Retired  Additional Comments: was getting therapy at home - working on hopping on right leg with walker. Vision/Hearing  WFL       Cognition   Orientation  Overall Orientation Status: Within Normal Limits  Cognition  Overall Cognitive Status: WNL     Objective   Heart Rate: 52  Heart Rate Source: Monitor  BP: (!) 92/55  BP Location: Right upper arm  BP Method: Automatic  Patient Position: Semi fowlers  MAP (Calculated): 67.33  Resp: 18  SpO2: 95 %  O2 Device: None (Room air)     Observation/Palpation  Observation: left bka      ROM -  WFL benson LE's  Strength -  At least 3+/5 functional for transfers with assist x 2      Bed mobility  Rolling to Left: Moderate assistance (with side roberts)  Rolling to Right: Moderate assistance (with side rail)  Supine to Sit: Stand by assistance (HOB elevated fully, very slow and effortful)  Transfers  Sit to Stand: 2 Person Assistance;Contact guard assistance;Maximum Assistance  Stand to sit: 2 Person Assistance;Contact guard assistance;Maximum Assistance  Bed to Chair: 2 Person Assistance;Contact guard assistance;Maximum assistance        Balance  Comments: Max assist x 1 and CG x 1 to guide to chair were needed for bed to chair lateral transfer.    AM-PAC Score  AM-PAC Inpatient Mobility Raw Score : 9 (08/01/22 1032)  AM-PAC Inpatient T-Scale Score : 30.55 (08/01/22 1032)  Mobility Inpatient CMS 0-100% Score: 81.38 (08/01/22 1032)  Mobility Inpatient CMS G-Code Modifier : JAVED (08/01/22 1032)   Goals  Short Term Goals  Time Frame for Short term goals: Discharge  Short term goal 1: Roll side to side with CG  Short term goal 2: Supine to sit with HOB flat  Short term goal 3: Bed to chair with min assist x 1  Patient Goals   Patient goals :  To return home       Education role of PT, safety, bed mobility, transfers      Therapy Time   Individual Concurrent Group Co-treatment   Time In 0908         Time Out 1010         Minutes 62             Timed Code Treatment Minutes:   47    Total Treatment Minutes:  [address] [place] [address]

## 2022-08-01 NOTE — PROGRESS NOTES
Occupational Therapy  Facility/Department: [place] [address] [facility]  Occupational Therapy Initial Assessment/ Treatment    Name: Debora Dominguez  :   MRN: [id number]  Date of Service: 2022    Discharge Recommendations:     OT Equipment Recommendations  Equipment Needed: No  Other: defer   Debora Dominguez scored a 16/24 on the AM-PAC ADL Inpatient form. Current research shows that an AM-PAC score of 17 or less is typically not associated with a discharge to the patient's home setting. Based on the patient's AM-PAC score and their current ADL deficits, it is recommended that the patient have 5-7 sessions per week of Occupational Therapy at d/c to increase the patient's independence. At this time, this patient demonstrates complex nursing, medical, and rehabilitative needs, and would benefit from intensive rehabilitation services upon discharge from the Inpatient setting. This patient demonstrates the ability to participate in and benefit from an intensive therapy program with a coordinated interdisciplinary team approach to foster frequent, structured, and documented communication among disciplines, who will work together to establish, prioritize, and achieve treatment goals. Please see assessment section for further patient specific details. If patient discharges prior to next session this note will serve as a discharge summary. Please see below for the latest assessment towards goals. Patient Diagnosis(es): The encounter diagnosis was Septicemia ([facility] Past Medical History:  has a past medical history of Arthritis, Chondroblastic osteosarcoma ([facility] Diabetes mellitus ([facility] Hyperlipidemia, and Unspecified cerebral artery occlusion with cerebral infarction. 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 Treatment Diagnosis: decreased ADLs and transfers secondary to COVID      Assessment   Performance deficits / Impairments: Decreased functional mobility ; Decreased endurance;Decreased coordination;Decreased ADL status; Decreased balance;Decreased strength;Decreased high-level IADLs;Decreased cognition  Assessment: Prior to admission pt was living at home alone, was independent with ADLs and IADLs from wc level; 2 months prior pt had been walking with his prosthetic leg but it has not been fitting recently and therefore had been wc level. Pt now presents below his baseline, demonstrating max 1/ CGA 1 for sit pivot transfers from bed to chair. Pt would benefit from continued OT while in acute care, AMPAC score indicates non homebound discharge. Continue OT POC. Treatment Diagnosis: decreased ADLs and transfers secondary to COVID  Prognosis: Good  Decision Making: Medium Complexity  REQUIRES OT FOLLOW-UP: Yes  Activity Tolerance  Activity Tolerance: Patient limited by fatigue  Activity Tolerance Comments: Pt reported mod to max fatigue after bed to chair transfer        Plan   Plan  Times per Week: 2-5  Times per Day: Daily  Current Treatment Recommendations: Strengthening, Balance training, Functional mobility training, Endurance training, Neuromuscular re-education, Cognitive reorientation, Self-Care / ADL, Patient/Caregiver education & training, Safety education & training, Coordination training     Restrictions  Position Activity Restriction  Other position/activity restrictions: up with assist, droplet+ isolation    Subjective   General  Chart Reviewed: Yes  Additional Pertinent Hx: Pt admitted to ED with c/o fever and weakness. Has history of CA with lung mets.  Pt had a fall prior to admitting sliding out of the chair. Pt found COVID +. PMHx includes: Arthritis, Chondroblastic osteosarcoma, Diabetes mellitus, Hyperlipidemia, and Unspecified cerebral artery occlusion with cerebral infarction. Family / Caregiver Present: No  Referring Practitioner: Dena Echeverria MD  Diagnosis: fever and chills  Subjective  Subjective: Pt semi supine in bed upon arrival, agreeable to OT eval and treat. Social/Functional History  Social/Functional History  Lives With: Alone  Type of Home: Apartment  Home Layout:  (laundry in unit)  Home Access: Stairs to enter with rails  Entrance Stairs - Number of Steps: 3 steps with benson rails, then at ground level apartment. Entrance Stairs - Rails: Both  Bathroom Shower/Tub: Tub/Shower unit  Bathroom Toilet: Handicap height  Bathroom Equipment: Shower chair, Toilet raiser, Grab bars around toilet  Bathroom Accessibility: Accessible  Home Equipment: Corey Rhein, rolling, Ebenezer Dates, Wheelchair-manual  Has the patient had two or more falls in the past year or any fall with injury in the past year?: Yes (slid out of WC recently, but no other falls, no injury noted)  ADL Assistance: Independent (able to dress, bathe and toilet)  Homemaking Assistance: Independent (indep cooking cleaning, laundry)  Ambulation Assistance: Non-ambulatory (before leg got swollen, he used prosthesis and cane - last walking about 2 months ago. Has used wheelchair these past two months.)  Transfer Assistance: Independent  Active : Yes (normally does own grocery shopping - these past few months has had groceries delivered)  Occupation: Retired  Additional Comments: was getting therapy at home - working on hopping on right leg with walker. Objective           Observation/Palpation  Observation: left bka  Safety Devices  Type of Devices: Call light within reach; Chair alarm in place;Nurse notified; Left in chair           ADL  Feeding: Beverage management; Thickened liquids;Setup (pt has been on thickened liquids prior to admission)  Grooming: Setup  Grooming Skilled Clinical Factors: washing face, combing hair  LE Dressing: Increased time to complete;Contact guard assistance  LE Dressing Skilled Clinical Factors: increased time to don R sock from EOB  Toileting: Dependent/Total  Toileting Skilled Clinical Factors: rolling in bed for meatal care s/p incontinent BM     Activity Tolerance  Activity Tolerance: Patient limited by endurance     Transfers  Stand Pivot Transfers: Maximum assistance;2 Person assistance;Contact guard assistance  Sit to stand: Maximum assistance;2 Person assistance;Contact guard assistance  Transfer Comments: pt performed sit pivot from bed to chair with max 1/ CGA 1 for steadying. Cognition  Overall Cognitive Status: WNL  Orientation  Overall Orientation Status: Within Normal Limits                  Education Given To: Patient  Education Provided: Role of Therapy;Plan of Care  Education Method: Verbal  Barriers to Learning: None  Education Outcome: Verbalized understanding            Treatment included functional transfer training, ADLs, and patient education. AM-PAC Score        AM-[facility] Inpatient Daily Activity Raw Score: 16 (08/01/22 1039)  AM-PAC Inpatient ADL T-Scale Score : 35.96 (08/01/22 1039)  ADL Inpatient CMS 0-100% Score: 53.32 (08/01/22 1039)  ADL Inpatient CMS G-Code Modifier : CK (08/01/22 1039)           Goals  Short Term Goals  Time Frame for Short term goals: by dc  Short Term Goal 1: Pt will complete LB dressing including pants with min A  Short Term Goal 2: Pt will complete BSC transfer with mod A  Short Term Goal 3: Pt will complete B UE HEP x 20 reps to improve activity tolerance for ADLs  Patient Goals   Patient goals : to go home       Therapy Time   Individual Concurrent Group Co-treatment   Time In 0909         Time Out 1010         Minutes 61         Timed Code Treatment Minutes: 46 Minutes (+15 min eval)     Dolly KENNY  [address] [facility], OTR/L [id number]

## 2022-08-01 NOTE — PLAN OF CARE
Problem: Discharge Planning  Goal: Discharge to home or other facility with appropriate resources  Outcome: Progressing  Flowsheets  Taken 8/1/2022 1817  Discharge to home or other facility with appropriate resources:   Identify barriers to discharge with patient and caregiver   Identify discharge learning needs (meds, wound care, etc)  Taken 8/1/2022 0822  Discharge to home or other facility with appropriate resources: Identify barriers to discharge with patient and caregiver     Problem: Skin/Tissue Integrity  Goal: Absence of new skin breakdown  Description: 1. Monitor for areas of redness and/or skin breakdown  2. Assess vascular access sites hourly  3. Every 4-6 hours minimum:  Change oxygen saturation probe site  4. Every 4-6 hours:  If on nasal continuous positive airway pressure, respiratory therapy assess nares and determine need for appliance change or resting period.   Outcome: Progressing  Note: Encouraging and assisting with patient turning every 2 hours to prevent skin breakdown     Problem: Safety - Adult  Goal: Free from fall injury  Outcome: Progressing  Flowsheets (Taken 8/1/2022 1815)  Free From Fall Injury: Instruct family/caregiver on patient safety  Note: Educated patient on falls precautions and interventions used to promote patient safety     Problem: Pain  Goal: Verbalizes/displays adequate comfort level or baseline comfort level  Outcome: Progressing  Flowsheets  Taken 8/1/2022 1817  Verbalizes/displays adequate comfort level or baseline comfort level:   Encourage patient to monitor pain and request assistance   Administer analgesics based on type and severity of pain and evaluate response   Assess pain using appropriate pain scale   Implement non-pharmacological measures as appropriate and evaluate response  Taken 8/1/2022 1608  Verbalizes/displays adequate comfort level or baseline comfort level: Encourage patient to monitor pain and request assistance  Taken 8/1/2022

## 2022-08-01 NOTE — PROGRESS NOTES
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 Riana Wills [facility], type 2 diabetes mellitus, hyperlipidemia, CKD stage III, HLD who presented to the ED with complaints of a fever. He reports that he checks his temperature daily and decided to come to the ED when he had a temperature of 100.2 today. He also reports feeling weak and had a fall today while shifting himself from the chair to the bed. Review of systems positive for body aches and fever. He denies chills, chest pain, shortness of, abdominal pain, changes in bowel habits, dysuria. Physical exam revealed rhonchorous lungs. Abdomen distended, chronic hernia, nontender. BKA of the left leg. Strikingly, the skin below the right knee extending towards the ankle was markedly warm without any erythema. Patient denies any recent swelling or pain in the leg. Multiple excoriations seen on the skin in the LE. On arrival to the ED, febrile to 100.6, BP 93/53, reportedly tachycardic, was on room air. Work-up in the ED revealing a slight rise in baseline creatinine. Normal leukocyte count as well as normal hemoglobin levels. UA unremarkable for a UTI. VBG normal.  Chest x-ray did not show any acute abnormality. Patient received sepsis bolus as well as Vancomycin and cefepime in the ED. Patient admitted for concern of an infectious etiology behind the weakness and fever. Blood cultures sent, covid swab done prior to admission.  Patient admitted to [facility] for further sounds: Rales present. Abdominal:      General: Abdomen is flat. Bowel sounds are normal. There is no distension. Tenderness: There is no abdominal tenderness. Musculoskeletal:      Right lower leg: Edema present. Comments: L ANTHONY with multiple excoriations and scabs near inferior end   Neurological:      Mental Status: He is alert. LABS:    CBC:   Recent Labs     07/30/22 1927 07/31/22 0451 08/01/22  0440   WBC 6.2 4.6 6.1   HGB 12.7* 11.3* 11.3*   HCT 36.3* 32.9* 32.2*    172 145   .2* 100.8* 101.6*       Renal:    Recent Labs     07/30/22 1927 07/31/22 0451 08/01/22  0440    137 138   K 4.7 4.4  4.4 3.9    102 105   CO2 24 24 19*   BUN 33* 36* 44*   CREATININE 2.8* 2.9* 3.5*   GLUCOSE 125* 105* 139*   CALCIUM 8.8 8.4 8.0*   PHOS  --  3.2 3.6   ANIONGAP 11 11 14       Hepatic:   Recent Labs     07/30/22 1927 07/31/22 0451 08/01/22  0440   AST 22 38* 59*   ALT 26 28 30   BILITOT 0.6 0.6 0.6   PROT 6.5 5.4* 5.4*   LABALBU 4.2 3.7 3.2*   ALKPHOS 73 63 54       Troponin:   Recent Labs     07/30/22 1927   TROPONINI <0.01       BNP: No results for input(s): BNP in the last 72 hours. Lipids: No results for input(s): CHOL, HDL in the last 72 hours. Invalid input(s): LDLCALCU, TRIGLYCERIDE  ABGs:  No results for input(s): PHART, APN0HLS, PO2ART, QLX1AJS, BEART, THGBART, V2VGEXCX, ZJR8PSR in the last 72 hours. INR: No results for input(s): INR in the last 72 hours. Lactate: No results for input(s): LACTATE in the last 72 hours. Cultures:  -----------------------------------------------------------------  RAD:   XR CHEST PORTABLE   Final Result   1. No acute disease.       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 Bre Pinto [facility], type 2 diabetes mellitus, hyperlipidemia, CKD stage III, HLD who presented to the ED with complaints of a fever. Sepsis 2/2 suspected COVID PNA vs cellulitis  Patient met SIRS criterion on arrival. Febrile and hypotensive, lactic acid normal.  Patient with symptoms of cough, weakness, body aches, concerning for a suspected respiratory infection. Given negative CXR findings, appears the COVID infection is confined to URT.   - S/p sepsis bolus, Vancomycin and Cefepime in the ED. - Bcx no growth to date; procalcitonin mildly elevated at 0.3  - supplemental O2 as needed, currently saturating well on RA  - Initiate decadron if hypoxia develops  - IVF  - Continue Ceftaroline   - Midodrine to help improve blood pressure    2. CKD IV - Up to 3.5 from 2.9 yesterday  History of AIN from [facility]  S/p sepsis bolus in the ED. Continue fluids. Patient follows up with [facility] nephrology outpatient. - monitor Cr, UOP  - avoid nephrotoxin agents  - Fluid changed to plasma-lyte  - Midodrine to help improve blood pressure    Chronic Medical Conditions:     3. T2DM: Patient on 19 units of Lantus nightly. Starting patient on low-dose siding scale insulin. POCT before meals and at bedtime. 4. Hyperlipidemia: On Lipitor. 5. Neuropathic pain: Continued home Lyrica 150 mg BID. 6. TE fistula s/p treatment of metastatic osteosarcoma. No aspiration events reported. 7.  Metastatic Osteosarcoma:  Patient follows up with Dr. Emmanuel Rice from [facility]. Previous treatment:  Adriamycin/cisplatin with intermitted high dose methotrexate followed by amputation. Max/CDDP Q21 days x 4 post -op 2/3/15 - 4/7/2015  Current therapy:  Gemcitabine D1,8 + Docetaxel D8 Q21D. Total cycle 21, on cycle 8. C1D1 04/17/2019     Will discuss with attending physician Dr. Theron Arriaza.      Code Status: Full  FEN: thick liquids  PPX: subq heparin  DISPO: Elle Montero MD, PGY-1  08/01/22  10:18 AM    This patient has been staffed and discussed with Parth Carreon MD.

## 2022-08-01 NOTE — CARE COORDINATION
Case Management Assessment           Initial Evaluation                Date / Time of Evaluation: 8/1/2022 3:30 PM                 Assessment Completed by: Emmanuel iDxon RN    Patient Name: Sejal Rodrigez     YOB: 1950  Diagnosis: Fever and chills [R50.9]  Septicemia ([address] [A41.9]  Sepsis [facility]) [A41.9]     Date / Time: 7/30/2022  6:27 PM    Patient Admission Status: Inpatient    If patient is discharged prior to next notation, then this note serves as note for discharge by case management. Current PCP: Antonio Puente [address] [place] [address] Patient: No    Chart Reviewed: Yes  Patient/ Family Interviewed: Yes    Initial assessment completed at bedside with: patient    Hospitalization in the last 30 days: No    Emergency Contacts:  Extended Emergency Contact Information  Primary Emergency Contact: [address] [facility] of [address] [place] [address] Phone: [phone]  Mobile Phone: [phone]  Relation: Domestic Partner  Secondary Emergency Contact: [address] [facility] of [address] [place] [address] Phone: [phone]  Mobile Phone: [phone]  Relation: Child    Advance Directives:   Code Status: Full Code    Healthcare Power of : Yes  Agent: Vanessa Cueva  Contact Number: [phone]    Copy present: Yes     In paper Chart: No    Scanned into EMR Yes    Financial  Payor: MEDICARE / Plan: MEDICARE PART A / Product Type: *No Product type* /     Pre-cert required for SNF: No    Pharmacy    [address] [phone]  [facility] [address]  Phone: [phone] Fax: [phone]      Potential assistance Purchasing Medications: Potential Assistance Purchasing Medications: No  Does Patient want to participate in local refill/ meds to beds program?: Yes    Meds To Beds General Rules:  1. Can ONLY be done Monday- Friday between 8:30am-5pm  2.  Prescription(s) must be in pharmacy by 3pm to be filled same day  3. Copy of patient's insurance/ prescription drug card and patient face sheet must be sent along with the prescription(s)  4. Cost of Rx cannot be added to hospital bill. If financial assistance is needed, please contact unit  or ;  or  CANNOT provide pharmacy voucher for patients co-pays  5. Patients can then  the prescription on their way out of the hospital at discharge, or pharmacy can deliver to the bedside if staff is available. (payment due at time of pick-up or delivery - cash, check, or card accepted)     Able to afford home medications/ co-pay costs: Yes    ADLS  Support Systems: Spouse/Significant Other, Children    PT AM-PAC: 9 /24  OT AM-PAC: 16 /24    [place]: from home alone  Steps: 3    Plans to RETURN to current housing: Yes  Barriers to RETURNING to current housing: medical clearance    Home Care Information  Currently ACTIVE with [address]: Yes  [address]: [place]omes  Phone: [phone]  Fax: [phone]          Durable Medical Equipment  DME Provider: n/a  Equipment: wheelchair    Home Oxygen and [address] prior to admission: No  [address]: Not Applicable      Dialysis  Active with HD/PD prior to admission: No  Nephrologist:     HD Center:  Not Applicable    DISCHARGE PLAN:  Disposition: Home with [address]: [address] for discharge: family     Factors facilitating achievement of predicted outcomes: Family support    Barriers to discharge: Medical complications    Additional Case Management Notes: Patient is from home alone, uses wheelchair and has prosthetic. Patient [facility], [facility], refuses SNF placement. Family to transport.     The Plan for Transition of Care is related to the following treatment goals of Fever and chills [R50.9]  Septicemia ([address] [A41.9]  Sepsis ([facility] [A41.9]    The Patient and/or patient representative Veena Ordonez and his family were provided with a choice of provider and agrees with the discharge plan Yes    Freedom of choice list was provided with basic dialogue that supports the patient's individualized plan of care/goals and shares the quality data associated with the providers.  Yes    Care Transition patient: No    Boom Waters RN  [facility]  Case Management Department  Ph: [phone]   Fax: [phone]

## 2022-08-02 ENCOUNTER — APPOINTMENT (OUTPATIENT)
Dept: ULTRASOUND IMAGING | Age: 72
DRG: 853 | End: 2022-08-02
Payer: OTHER GOVERNMENT

## 2022-08-02 LAB
BASOPHILS ABSOLUTE: 0 K/UL (ref 0–0.2)
BASOPHILS RELATIVE PERCENT: 0.2 %
C-REACTIVE PROTEIN: 85.8 MG/L (ref 0–5.1)
EKG ATRIAL RATE: 46 BPM
EKG DIAGNOSIS: NORMAL
EKG P AXIS: 36 DEGREES
EKG P-R INTERVAL: 146 MS
EKG Q-T INTERVAL: 436 MS
EKG QRS DURATION: 98 MS
EKG QTC CALCULATION (BAZETT): 381 MS
EKG R AXIS: -43 DEGREES
EKG T AXIS: 12 DEGREES
EKG VENTRICULAR RATE: 46 BPM
EOSINOPHILS ABSOLUTE: 0 K/UL (ref 0–0.6)
EOSINOPHILS RELATIVE PERCENT: 0 %
GI BACTERIAL PATHOGENS BY PCR: NORMAL
GLUCOSE BLD-MCNC: 127 MG/DL (ref 70–99)
GLUCOSE BLD-MCNC: 162 MG/DL (ref 70–99)
GLUCOSE BLD-MCNC: 205 MG/DL (ref 70–99)
GLUCOSE BLD-MCNC: 288 MG/DL (ref 70–99)
HCT VFR BLD CALC: 31.1 % (ref 40.5–52.5)
HEMOGLOBIN: 10.6 G/DL (ref 13.5–17.5)
HEPATITIS B SURFACE ANTIGEN INTERPRETATION: NORMAL
LYMPHOCYTES ABSOLUTE: 0.5 K/UL (ref 1–5.1)
LYMPHOCYTES RELATIVE PERCENT: 9.3 %
MCH RBC QN AUTO: 34.8 PG (ref 26–34)
MCHC RBC AUTO-ENTMCNC: 34.2 G/DL (ref 31–36)
MCV RBC AUTO: 101.5 FL (ref 80–100)
MONOCYTES ABSOLUTE: 0.4 K/UL (ref 0–1.3)
MONOCYTES RELATIVE PERCENT: 9 %
NEUTROPHILS ABSOLUTE: 4.1 K/UL (ref 1.7–7.7)
NEUTROPHILS RELATIVE PERCENT: 81.5 %
PDW BLD-RTO: 13.6 % (ref 12.4–15.4)
PERFORMED ON: ABNORMAL
PHOSPHORUS: 4.3 MG/DL (ref 2.5–4.9)
PLATELET # BLD: 135 K/UL (ref 135–450)
PMV BLD AUTO: 8.4 FL (ref 5–10.5)
RBC # BLD: 3.06 M/UL (ref 4.2–5.9)
WBC # BLD: 5 K/UL (ref 4–11)

## 2022-08-02 PROCEDURE — 2580000003 HC RX 258: Performed by: INTERNAL MEDICINE

## 2022-08-02 PROCEDURE — 36415 COLL VENOUS BLD VENIPUNCTURE: CPT

## 2022-08-02 PROCEDURE — 2580000003 HC RX 258

## 2022-08-02 PROCEDURE — 2500000003 HC RX 250 WO HCPCS: Performed by: STUDENT IN AN ORGANIZED HEALTH CARE EDUCATION/TRAINING PROGRAM

## 2022-08-02 PROCEDURE — 2060000000 HC ICU INTERMEDIATE R&B

## 2022-08-02 PROCEDURE — 6370000000 HC RX 637 (ALT 250 FOR IP)

## 2022-08-02 PROCEDURE — 97530 THERAPEUTIC ACTIVITIES: CPT

## 2022-08-02 PROCEDURE — 84100 ASSAY OF PHOSPHORUS: CPT

## 2022-08-02 PROCEDURE — 3E0333Z INTRODUCTION OF ANTI-INFLAMMATORY INTO PERIPHERAL VEIN, PERCUTANEOUS APPROACH: ICD-10-PCS | Performed by: INTERNAL MEDICINE

## 2022-08-02 PROCEDURE — 85025 COMPLETE CBC W/AUTO DIFF WBC: CPT

## 2022-08-02 PROCEDURE — 2580000003 HC RX 258: Performed by: STUDENT IN AN ORGANIZED HEALTH CARE EDUCATION/TRAINING PROGRAM

## 2022-08-02 PROCEDURE — 2700000000 HC OXYGEN THERAPY PER DAY

## 2022-08-02 PROCEDURE — 93010 ELECTROCARDIOGRAM REPORT: CPT | Performed by: INTERNAL MEDICINE

## 2022-08-02 PROCEDURE — 6360000002 HC RX W HCPCS: Performed by: INTERNAL MEDICINE

## 2022-08-02 PROCEDURE — XW0DXM6 INTRODUCTION OF BARICITINIB INTO MOUTH AND PHARYNX, EXTERNAL APPROACH, NEW TECHNOLOGY GROUP 6: ICD-10-PCS | Performed by: INTERNAL MEDICINE

## 2022-08-02 PROCEDURE — 80053 COMPREHEN METABOLIC PANEL: CPT

## 2022-08-02 PROCEDURE — 94761 N-INVAS EAR/PLS OXIMETRY MLT: CPT

## 2022-08-02 PROCEDURE — 6360000002 HC RX W HCPCS

## 2022-08-02 PROCEDURE — 97535 SELF CARE MNGMENT TRAINING: CPT

## 2022-08-02 PROCEDURE — 6370000000 HC RX 637 (ALT 250 FOR IP): Performed by: INTERNAL MEDICINE

## 2022-08-02 PROCEDURE — C9113 INJ PANTOPRAZOLE SODIUM, VIA: HCPCS | Performed by: INTERNAL MEDICINE

## 2022-08-02 PROCEDURE — 99223 1ST HOSP IP/OBS HIGH 75: CPT | Performed by: INTERNAL MEDICINE

## 2022-08-02 PROCEDURE — 86140 C-REACTIVE PROTEIN: CPT

## 2022-08-02 PROCEDURE — 97110 THERAPEUTIC EXERCISES: CPT

## 2022-08-02 PROCEDURE — 51798 US URINE CAPACITY MEASURE: CPT

## 2022-08-02 RX ORDER — SACCHAROMYCES BOULARDII 250 MG
250 CAPSULE ORAL 2 TIMES DAILY
Status: DISCONTINUED | OUTPATIENT
Start: 2022-08-02 | End: 2022-08-08 | Stop reason: HOSPADM

## 2022-08-02 RX ORDER — DEXAMETHASONE SODIUM PHOSPHATE 4 MG/ML
6 INJECTION, SOLUTION INTRA-ARTICULAR; INTRALESIONAL; INTRAMUSCULAR; INTRAVENOUS; SOFT TISSUE EVERY 24 HOURS
Status: DISCONTINUED | OUTPATIENT
Start: 2022-08-02 | End: 2022-08-04

## 2022-08-02 RX ORDER — PANTOPRAZOLE SODIUM 40 MG/10ML
40 INJECTION, POWDER, LYOPHILIZED, FOR SOLUTION INTRAVENOUS DAILY
Status: DISCONTINUED | OUTPATIENT
Start: 2022-08-02 | End: 2022-08-04

## 2022-08-02 RX ADMIN — SODIUM CHLORIDE, PRESERVATIVE FREE 10 ML: 5 INJECTION INTRAVENOUS at 09:40

## 2022-08-02 RX ADMIN — ATORVASTATIN CALCIUM 20 MG: 20 TABLET, FILM COATED ORAL at 09:37

## 2022-08-02 RX ADMIN — PANTOPRAZOLE SODIUM 40 MG: 40 INJECTION, POWDER, FOR SOLUTION INTRAVENOUS at 14:26

## 2022-08-02 RX ADMIN — HEPARIN SODIUM 5000 UNITS: 5000 INJECTION INTRAVENOUS; SUBCUTANEOUS at 12:46

## 2022-08-02 RX ADMIN — SODIUM BICARBONATE: 84 INJECTION, SOLUTION INTRAVENOUS at 23:17

## 2022-08-02 RX ADMIN — Medication 250 MG: at 12:44

## 2022-08-02 RX ADMIN — SODIUM CHLORIDE, SODIUM GLUCONATE, SODIUM ACETATE, POTASSIUM CHLORIDE AND MAGNESIUM CHLORIDE: 526; 502; 368; 37; 30 INJECTION, SOLUTION INTRAVENOUS at 02:41

## 2022-08-02 RX ADMIN — SODIUM CHLORIDE, PRESERVATIVE FREE 10 ML: 5 INJECTION INTRAVENOUS at 21:30

## 2022-08-02 RX ADMIN — CEFTAROLINE FOSAMIL 300 MG: 600 POWDER, FOR SOLUTION INTRAVENOUS at 10:50

## 2022-08-02 RX ADMIN — Medication 250 MG: at 21:30

## 2022-08-02 RX ADMIN — BARICITINIB 1 MG: 2 TABLET, FILM COATED ORAL at 14:26

## 2022-08-02 RX ADMIN — ASPIRIN 81 MG: 81 TABLET, COATED ORAL at 09:37

## 2022-08-02 RX ADMIN — HEPARIN SODIUM 5000 UNITS: 5000 INJECTION INTRAVENOUS; SUBCUTANEOUS at 06:16

## 2022-08-02 RX ADMIN — BENZONATATE 100 MG: 100 CAPSULE ORAL at 01:07

## 2022-08-02 RX ADMIN — AZITHROMYCIN DIHYDRATE 500 MG: 500 INJECTION, POWDER, LYOPHILIZED, FOR SOLUTION INTRAVENOUS at 19:10

## 2022-08-02 RX ADMIN — SODIUM BICARBONATE: 84 INJECTION, SOLUTION INTRAVENOUS at 12:30

## 2022-08-02 RX ADMIN — DEXAMETHASONE SODIUM PHOSPHATE 6 MG: 4 INJECTION, SOLUTION INTRAMUSCULAR; INTRAVENOUS at 12:44

## 2022-08-02 RX ADMIN — HEPARIN SODIUM 5000 UNITS: 5000 INJECTION INTRAVENOUS; SUBCUTANEOUS at 21:30

## 2022-08-02 ASSESSMENT — PAIN SCALES - GENERAL
PAINLEVEL_OUTOF10: 0

## 2022-08-02 ASSESSMENT — ENCOUNTER SYMPTOMS
CONSTIPATION: 0
CHEST TIGHTNESS: 0
SHORTNESS OF BREATH: 0
DIARRHEA: 1
WHEEZING: 0

## 2022-08-02 NOTE — PROGRESS NOTES
Progress Note    Admit Date: 7/30/2022  Day: 4  Diet: ADULT DIET; Regular; Mildly Thick (Nectar)    CC: Fever, fatigue    Interval history:   - Pt was seen and examined at bedside; he is satting comfortably on 2L NC, afebrile and hemodynamically stable with constant bradycardia but asymptomatic  - Pt denies any pain, shortness of breath, cough, but persistently brings up sputum  - Pt denies any pain on his RLE; ceftaroline changed to ceftriaxone and continues to be on azithromycin for atypical pneumonia  - Midodrine d/c for low HR  - Fluids changed to sodium bicarb due to persistent diarrhea  - Bladder scan pending to check for urinary retention  - Pt CRP is elevated to 85.8    HPI:   The patient is a 77-year-old male with past medical history of 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 Jesus [facility], type 2 diabetes mellitus, hyperlipidemia, CKD stage III, HLD who presented to the ED with complaints of a fever. He reports that he checks his temperature daily and decided to come to the ED when he had a temperature of 100.2 today. He also reports feeling weak and had a fall today while shifting himself from the chair to the bed. Review of systems positive for body aches and fever. He denies chills, chest pain, shortness of, abdominal pain, changes in bowel habits, dysuria. Physical exam revealed rhonchorous lungs. Abdomen distended, chronic hernia, nontender. BKA of the left leg. Strikingly, the skin below the right knee extending towards the ankle was markedly warm without any erythema. Patient denies any recent swelling or pain in the leg. Multiple excoriations seen on the skin in the LE. On arrival to the ED, febrile to 100.6, BP 93/53, reportedly tachycardic, was on room air. Work-up in the ED revealing a slight rise in baseline creatinine. Normal leukocyte count as well as normal hemoglobin levels.   UA unremarkable for a UTI. VBG normal.  Chest x-ray did not show any acute abnormality. Patient received sepsis bolus as well as Vancomycin and cefepime in the ED. Patient admitted for concern of an infectious etiology behind the weakness and fever. Blood cultures sent, covid swab done prior to admission. Patient admitted to [facility] for further management.       Medications:     Scheduled Meds:   [START ON 8/3/2022] cefTRIAXone (ROCEPHIN) IV  1,000 mg IntraVENous Q24H    dexamethasone  6 mg IntraVENous Q24H    pantoprazole  40 mg IntraVENous Daily    saccharomyces boulardii  250 mg Oral BID    baricitinib  1 mg Oral Daily    azithromycin  500 mg IntraVENous Q24H    aspirin  81 mg Oral Daily    atorvastatin  20 mg Oral Daily    sodium chloride flush  5-40 mL IntraVENous 2 times per day    heparin (porcine)  5,000 Units SubCUTAneous 3 times per day    sodium chloride flush  5-40 mL IntraVENous 2 times per day    insulin lispro  0-4 Units SubCUTAneous TID WC    insulin lispro  0-4 Units SubCUTAneous Nightly     Continuous Infusions:   sodium bicarbonate infusion 100 mL/hr at 08/02/22 1230    sodium chloride 10 mL (08/01/22 2246)    sodium chloride      dextrose       PRN Meds:diphenhydrAMINE, melatonin, sodium chloride flush, sodium chloride, ondansetron **OR** ondansetron, polyethylene glycol, acetaminophen **OR** acetaminophen, benzonatate, sodium chloride flush, sodium chloride, glucose, dextrose bolus **OR** dextrose bolus, glucagon (rDNA), dextrose    Objective:   Vitals:   T-max:  Patient Vitals for the past 8 hrs:   BP Temp Temp src Pulse Resp SpO2   08/02/22 1235 (!) 93/52 98.1 °F (36.7 °C) Oral (!) 35 18 96 %   08/02/22 0924 101/62 97.9 °F (36.6 °C) Oral (!) 42 18 94 %   08/02/22 0855 -- -- -- (!) 42 -- --       Intake/Output Summary (Last 24 hours) at 8/2/2022 1420  Last data filed at 8/2/2022 1018  Gross per 24 hour   Intake 420 ml   Output 750 ml   Net -330 ml       Review of Systems   Constitutional: Negative. Respiratory:  Negative for chest tightness, shortness of breath and wheezing. Cardiovascular:  Negative for chest pain and palpitations. Gastrointestinal:  Positive for diarrhea. Negative for constipation. Neurological:  Negative for headaches. Psychiatric/Behavioral: Negative. Physical Exam  Eyes:      Extraocular Movements: Extraocular movements intact. Pupils: Pupils are equal, round, and reactive to light. Cardiovascular:      Rate and Rhythm: Regular rhythm. Bradycardia present. Pulses: Normal pulses. Heart sounds: Normal heart sounds. No murmur heard. No gallop. Pulmonary:      Effort: Pulmonary effort is normal.      Breath sounds: Normal breath sounds. Abdominal:      General: Abdomen is flat. Palpations: Abdomen is soft. Musculoskeletal:      Comments: Left lower leg amputation   Neurological:      Mental Status: He is alert. Psychiatric:         Mood and Affect: Mood normal.         Behavior: Behavior normal.       LABS:    CBC:   Recent Labs     07/31/22 0451 08/01/22 0440 08/02/22 0455   WBC 4.6 6.1 5.0   HGB 11.3* 11.3* 10.6*   HCT 32.9* 32.2* 31.1*    145 135   .8* 101.6* 101.5*     Renal:    Recent Labs     07/31/22 0451 08/01/22 0440 08/02/22 0455    138 139   K 4.4  4.4 3.9 4.1    105 105   CO2 24 19* 20*   BUN 36* 44* 52*   CREATININE 2.9* 3.5* 3.9*   GLUCOSE 105* 139* 146*   CALCIUM 8.4 8.0* 8.0*   PHOS 3.2 3.6 4.3   ANIONGAP 11 14 14     Hepatic:   Recent Labs     07/31/22  0451 08/01/22 0440 08/02/22 0455   AST 38* 59* 63*   ALT 28 30 32   BILITOT 0.6 0.6 0.6   PROT 5.4* 5.4* 5.1*   LABALBU 3.7 3.2* 3.3*   ALKPHOS 63 54 51     Troponin:   Recent Labs     07/30/22 1927   TROPONINI <0.01     BNP: No results for input(s): BNP in the last 72 hours. Lipids: No results for input(s): CHOL, HDL in the last 72 hours.     Invalid input(s): LDLCALCU, TRIGLYCERIDE  ABGs:  No results for input(s): PHART, IID8SBZ, PO2ART, AVI0RHO, BEART, THGBART, R5JFBOFG, CHE7FZC in the last 72 hours. INR:   Recent Labs     08/01/22 2010   INR 1.23*     Lactate: No results for input(s): LACTATE in the last 72 hours. Cultures:  -----------------------------------------------------------------  RAD:   VL Extremity Venous Right   Final Result      CT CHEST WO CONTRAST   Final Result      Bilateral patchy parenchymal infiltrates are seen throughout both lungs, left greater than right. The appearance is suggestive of atypical pneumonia. Recommend follow-up imaging to verify resolution and exclude underlying nodularity. Coronary calcification. Thyromegaly with calcifications noted in the lower neck. XR CHEST PORTABLE   Final Result   1. No acute disease. 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 [facility], type 2 diabetes mellitus, hyperlipidemia, CKD stage III, HLD who presented to the ED with complaints of a fever. Sepsis 2/2 suspected COVID PNA vs cellulitis  Patient met SIRS criterion on arrival. Febrile and hypotensive, lactic acid normal.  Patient with symptoms of cough, weakness, body aches, concerning for a suspected respiratory infection. Given negative CXR findings, appears the COVID infection is confined to URT.  - S/p sepsis bolus in ED  - Bcx no growth to date; procalcitonin mildly elevated at 0.3  - supplemental O2 as needed, currently saturating well on 2L NC  - IVF: NaHCO3 @ 100  - Rocephin and azithromycin  - Midodrine d/c: for bradycardia     CKD IV - Up to 3.9 from 3.2 yesterday  History of AIN from [place]. Patient follows up with [facility] nephrology outpatient.   - monitor Cr, UOP  - avoid nephrotoxin agents  - Fluid changed to sodium bicarb   - Bladder scan pending: monitoring for urinary retention     Chronic Medical Conditions:     3. T2DM: Patient on 19 units of Lantus nightly. Starting patient on low-dose siding scale insulin. POCT before meals and at bedtime. 4. Hyperlipidemia: On Lipitor. 5. Neuropathic pain: Continued home Lyrica 150 mg BID. 6. TE fistula s/p treatment of metastatic osteosarcoma. No aspiration events reported. 7.  Metastatic Osteosarcoma:  Patient follows up with Dr. Yuniel Sanchez from [facility]. Previous treatment:  Adriamycin/cisplatin with intermitted high dose methotrexate followed by amputation. Max/CDDP Q21 days x 4 post -op 2/3/15 - 4/7/2015  Current therapy:  Gemcitabine D1,8 + Docetaxel D8 Q21D. Total cycle 21, on cycle 8.  C1D1 04/17/2019     Code Status: Full  FEN: thick liquids  PPX: subq heparin  DISPO: IVAN Zhou DO, PGY-1  08/02/22  2:20 PM    This patient has been staffed and discussed with Dennis Perez MD.

## 2022-08-02 NOTE — PROGRESS NOTES
Physical Therapy  Facility/Department: [place] [address] [facility]  Physical Therapy Daily Treatment    Name: Sejal Rodrigez  :   MRN: [id number]  Date of Service: 2022    Discharge Recommendations: Sejal Rodrigez scored a 10/24 on the AM-PAC short mobility form. Current research shows that an AM-PAC score of 17 or less is typically not associated with a discharge to the patient's home setting. Based on the patient's AM-PAC score and their current functional mobility deficits, it is recommended that the patient have 5-7 sessions per week of Physical Therapy at d/c to increase the patient's independence. At this time, this patient demonstrates complex nursing, medical, and rehabilitative needs, and would benefit from intensive rehabilitation services upon discharge from the Inpatient setting. This patient demonstrates the ability to participate in and benefit from an intensive therapy program with a coordinated interdisciplinary team approach to foster frequent, structured, and documented communication among disciplines, who will work together to establish, prioritize, and achieve treatment goals. Please see assessment section for further patient specific details. If patient discharges prior to next session this note will serve as a discharge summary. Please see below for the latest assessment towards goals. PT Equipment Recommendations  Equipment Needed: No      Patient Diagnosis(es): The encounter diagnosis was Septicemia ([facility] Past Medical History:  has a past medical history of Arthritis, Chondroblastic osteosarcoma ([facility] [facility] Diabetes mellitus ([facility] [facility] Hyperlipidemia, and Unspecified cerebral artery occlusion with cerebral infarction. Past Surgical History:  has a past surgical history that includes Cholecystectomy; lipoma resection (); Foreign Body Removal; Colonoscopy; Leg Surgery (Left, 14);  Tonsillectomy; other surgical history (Left, 10/22/2014); other surgical history (Right, no other falls, no injury noted)  ADL Assistance: Independent (able to dress, bathe and toilet)  Homemaking Assistance: Independent (indep cooking cleaning, laundry)  Ambulation Assistance: Non-ambulatory (before leg got swollen, he used prosthesis and cane - last walking about 2 months ago. Has used wheelchair these past two months.)  Transfer Assistance: Independent  Active : Yes (normally does own grocery shopping - these past few months has had groceries delivered)  Occupation: Retired  Additional Comments: was getting therapy at home - working on hopping on right leg with walker. Vision/Hearing  Hearing  Hearing: Within functional limits    Cognition         Objective   Heart Rate: (!) 35 (asymptomatic)  Heart Rate Source: Monitor  BP: (!) 93/52  BP Location: Right upper arm  BP Method: Automatic  Patient Position: Semi fowlers  MAP (Calculated): 65.67  Resp: 18  SpO2: 96 %  O2 Device: None (Room air)        Gross Assessment  AROM: Generally decreased, functional (RLE)  Strength: Generally decreased, functional (RLE)                    Bed mobility  Supine to Sit: Minimal assistance (Hand held assist)  Transfers  Sit to Stand: Minimal Assistance;2 Person Assistance [place] Rogelio Steady)  Stand to sit: Minimal Assistance;2 Person Assistance [place] Rogelio Steady)  Bed to Chair: Minimal assistance;2 Person Assistance [place] Rogelio Steady)        Balance  Sitting - Static: Good  Sitting - Dynamic: Fair;+  Standing - Static: Fair;+  Standing - Dynamic: Fair  Comments: Standing in [facility] steady multiple trials able to stand 2 minutes 30 seconds in the [facility] steady.   Exercise Treatment: multiple sit<>stands, Standing tolerance        OutComes Score                                                  AM-PAC Score  AM-PAC Inpatient Mobility Raw Score : 10 (08/02/22 1505)  AM-PAC Inpatient T-Scale Score : 32.29 (08/02/22 1505)  Mobility Inpatient CMS 0-100% Score: 76.75 (08/02/22 1505)  Mobility Inpatient CMS G-Code Modifier : CL (08/02/22 1505)          Tinneti Score       Goals  Short Term Goals  Time Frame for Short term goals: Discharge- all on going  Short term goal 1: Roll side to side with CG  Short term goal 2: Supine to sit with HOB flat, SBA  Short term goal 3: Bed to chair with min assist x 1  Patient Goals   Patient goals :  To return home       Education  Patient Education  Education Given To: Patient  Education Provided: Role of Therapy  Education Method: Demonstration  Barriers to Learning: None  Education Outcome: Verbalized understanding      Therapy Time   Individual Concurrent Group Co-treatment   Time In 1320         Time Out 1414         Minutes 2 [place], [place]

## 2022-08-02 NOTE — ACP (ADVANCE CARE PLANNING)
Advance Care Planning     Advance Care Planning Inpatient Note  Spiritual Care Department    Today's Date: 8/2/2022  Unit: [facility]    Received request from [employer]. Upon review of chart and communication with care team, patient's decision making abilities are not in question. . Patient was/were present in the room during visit. Goals of ACP Conversation:  Discuss advance care planning documents  Facilitate a discussion related to patient's goals of care as they align with the patient's values and beliefs. Health Care Decision Makers:       Primary Decision Maker: Shirin Saeed Child - [phone]    Secondary Decision Maker: Elza Powers - Domestic Partner - [phone]  Summary:    [address]    Advance Care Planning Documents (Patient Wishes): Anatomical Gift/Organ Donation     Assessment:  The patient stated that he has had good care. He believes in Mother Dene Re as a spiritual option. Interventions:  Discussed and provided education on state decision maker hierarchy    Care Preferences Communicated: The medical staff will address this information. Outcomes/Plan:  Existing advance directive reviewed with patient; no changes to patient's previously recorded wishes.     Electronically signed by Belia Orr, [address] on 8/2/2022 at 3:42 PM

## 2022-08-02 NOTE — PROGRESS NOTES
Pt's heart rate has been sustaining in the 30's. Pt remains asymptomatic. On-call resident notified. STAT EKG ordered.  Electronically signed by Reyes Paterson, RN on 8/2/2022 at 2:23 AM

## 2022-08-02 NOTE — PROGRESS NOTES
Occupational Therapy  Facility/Department: [place] [address] [facility]  Daily Treatment    Name: Brandon Penn  :   MRN: [id number]  Date of Service: 2022    Discharge Recommendations:  Brandon Penn scored a 14/24 on the AM-PAC ADL Inpatient form. Current research shows that an AM-PAC score of 17 or less is typically not associated with a discharge to the patient's home setting. Based on the patient's AM-PAC score and their current ADL deficits, it is recommended that the patient have 3-5 sessions per week of Occupational Therapy at d/c to increase the patient's independence. Please see assessment section for further patient specific details. If patient discharges prior to next session this note will serve as a discharge summary. Please see below for the latest assessment towards goals. OT Equipment Recommendations  Equipment Needed: No  Other: defer       Patient Diagnosis(es): The encounter diagnosis was Septicemia ([facility] [address] Past Medical History:  has a past medical history of Arthritis, Chondroblastic osteosarcoma ([facility] [address] Diabetes mellitus ([facility] [address] Hyperlipidemia, and Unspecified cerebral artery occlusion with cerebral infarction. Past Surgical History:  has a past surgical history that includes Cholecystectomy; lipoma resection (); Foreign Body Removal; Colonoscopy; Leg Surgery (Left, 14); Tonsillectomy; other surgical history (Left, 10/22/2014); other surgical history (Right, 10/27/14); Tonsillectomy; Abdomen surgery; Cholecystectomy (); Colonoscopy; Endoscopy, colon, diagnostic; Tunneled venous port placement (Right); and Leg amputation below knee (Left, 14). Treatment Diagnosis: decreased ADLs and transfers secondary to COVID    Assessment   Performance deficits / Impairments: Decreased functional mobility ; Decreased endurance;Decreased coordination;Decreased ADL status; Decreased balance;Decreased strength;Decreased high-level IADLs;Decreased cognition  Assessment: Prior to admission pt was living at home alone, was independent with ADLs and IADLs from wc level; 2 months prior pt had been walking with his prosthetic leg but it has not been fitting recently and therefore had been wc level. Pt now presents below his baseline, demonstrating min A with Wen Salas for sit to stand, able to maintain static standing balance up to 2.5 mins with CGA. SPT not addressed today as pt requesting to get to toilet. Pt would benefit from continued OT while in acute care, AMPAC score indicates non homebound discharge. Continue OT POC. Treatment Diagnosis: decreased ADLs and transfers secondary to COVID  Prognosis: Good  REQUIRES OT FOLLOW-UP: Yes  Activity Tolerance  Activity Tolerance: Patient Tolerated treatment well        Plan   Plan  Times per Week: 2-5  Times per Day: Daily  Current Treatment Recommendations: Strengthening, Balance training, Functional mobility training, Endurance training, Neuromuscular re-education, Cognitive reorientation, Self-Care / ADL, Patient/Caregiver education & training, Safety education & training, Coordination training     Restrictions  Position Activity Restriction  Other position/activity restrictions: up with assist, droplet+ isolation    Subjective   General  Chart Reviewed: Yes  Patient assessed for rehabilitation services?: Yes  Additional Pertinent Hx: Pt admitted to ED with c/o fever and weakness. Has history of CA with lung mets. Pt had a fall prior to admitting sliding out of the chair. Pt found COVID +. PMHx includes: Arthritis, Chondroblastic osteosarcoma, Diabetes mellitus, Hyperlipidemia, and Unspecified cerebral artery occlusion with cerebral infarction. Family / Caregiver Present: No  Referring Practitioner: Miriam Bates MD  Diagnosis: fever and chills, COVID 19  Subjective  Subjective: Pt supine in bed upon arrival, agreeable to OT treatment.        Social/Functional History  Social/Functional History  Lives With: Alone  Type of Home: [facility]  with Anthoston Waldo)  Stand to sit: Dependent/Total (min A with Anthoston Waldo for controlled descent)    Therapeutic Activity:  Static stands in [place] Waldo with min A with seated rest between for ADL and to build functional standing activity tolerance - 45s to 1min 4x, 1.5 min 3x, 2.5 mins 1x    Hearing  Hearing: Within functional limits    Cognition  Overall Cognitive Status: WNL  Orientation  Overall Orientation Status: Within Normal Limits                                        Education: Role of OT, safe t/f training, safe use of DME, awareness of deficits, discharge planning, ADL as therapeutic exercise, importance of OOB, energy conservation techniques       AM-PAC Score        AM-PAC Inpatient Daily Activity Raw Score: 14 (08/02/22 1546)  AM-PAC Inpatient ADL T-Scale Score : 33.39 (08/02/22 1546)  ADL Inpatient CMS 0-100% Score: 59.67 (08/02/22 1546)  ADL Inpatient CMS G-Code Modifier : CK (08/02/22 1546)       Goals  Short Term Goals  Time Frame for Short term goals: by dc - cont all goals 8/2  Short Term Goal 1: Pt will complete LB dressing including pants with min A  Short Term Goal 2: Pt will complete BSC transfer with mod A  Short Term Goal 3: Pt will complete B UE HEP x 20 reps to improve activity tolerance for ADLs  Patient Goals   Patient goals : to go home       Therapy Time   Individual Concurrent Group Co-treatment   Time In 1322         Time Out 1415         Minutes 53         Timed Code Treatment Minutes: 48 Minutes       If patient is discharged prior to next treatment session, this note will serve as the discharge summary.   Arron Patel OTR/L #[id number]

## 2022-08-02 NOTE — CONSULTS
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 Consuelo Cleaning is a 75-year-old man with history of osteosarcoma. He is status posttreatment as shown above. Pathology he required a left below the knee amputation. He has known metastatic disease which has been stable since he received pembrolizumab. This was held in April 2022 for worsening renal failure. He has a known tracheal esophageal fistula. He is followed by Dr. Prerna Lujan of ENT at [facility]. He has had numerous discussions regarding surgery versus sleeve versus observation. Is been relatively asymptomatic except cough. He has been maintaining his weight. He has been using a thick diet and maneuvers to prevent further aspiration or leakage and has been doing well up until this time. He presented to the emergency department with fever and was found to be hypotensive and tachycardic with an elevated lactic acid. He was admitted for presumptive treatment of sepsis. He was determined to be COVID-positive. He is also noted to have worsening of his baseline kidney function and now noted to be bradycardic. He does not have any new difficulty swallowing. He is on room air.     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  Last one February 2014    ENDOSCOPY, COLON, DIAGNOSTIC      Upper GI prior to Cholecystectomy    FOREIGN BODY REMOVAL      LEG AMPUTATION BELOW KNEE Left 12/08/14    LEG SURGERY Left 8/18/14    biopsy left lower leg    LIPOMA RESECTION  2013    OTHER SURGICAL HISTORY Left 10/22/2014    LEFT SUBCLAVIAN PORT REMOVAL              OTHER SURGICAL HISTORY Right 10/27/14    INSERTION OF SINGLE LUMEN PORT A CATH    TONSILLECTOMY      TONSILLECTOMY      childhood    TUNNELED VENOUS PORT PLACEMENT Right     Power Port       Current Medications:    Current Facility-Administered Medications: sodium bicarbonate 150 mEq in dextrose 5 % 1,000 mL infusion, , IntraVENous, Continuous  [START ON 8/3/2022] cefTRIAXone (ROCEPHIN) 1,000 mg in dextrose 5 % 50 mL IVPB mini-bag, 1,000 mg, IntraVENous, Q24H  dexamethasone (DECADRON) injection 6 mg, 6 mg, IntraVENous, Q24H  pantoprazole (PROTONIX) injection 40 mg, 40 mg, IntraVENous, Daily  saccharomyces boulardii (FLORASTOR) capsule 250 mg, 250 mg, Oral, BID  baricitinib (OLUMIANT) tablet 1 mg, 1 mg, Oral, Daily  azithromycin (ZITHROMAX) 500 mg in dextrose 5 % 250 mL IVPB, 500 mg, IntraVENous, Q24H  aspirin EC tablet 81 mg, 81 mg, Oral, Daily  atorvastatin (LIPITOR) tablet 20 mg, 20 mg, Oral, Daily  diphenhydrAMINE (BENADRYL) tablet 25 mg, 25 mg, Oral, Nightly PRN  melatonin disintegrating tablet 10 mg, 10 mg, Oral, Nightly PRN  sodium chloride flush 0.9 % injection 5-40 mL, 5-40 mL, IntraVENous, 2 times per day  sodium chloride flush 0.9 % injection 5-40 mL, 5-40 mL, IntraVENous, PRN  0.9 % sodium chloride infusion, , IntraVENous, PRN  ondansetron (ZOFRAN-ODT) disintegrating tablet 4 mg, 4 mg, Oral, Q8H PRN **OR** ondansetron (ZOFRAN) injection 4 mg, 4 mg, IntraVENous, Q6H PRN  polyethylene glycol (GLYCOLAX) packet 17 g, 17 g, Oral, Daily PRN  acetaminophen (TYLENOL) tablet 650 mg, 650 mg, Oral, Q6H PRN **OR** acetaminophen (TYLENOL) suppository 650 mg, 650 mg, Rectal, Q6H PRN  heparin (porcine) injection 5,000 Units, 5,000 Units, SubCUTAneous, 3 times per day  benzonatate (TESSALON) capsule 100 mg, 100 mg, Oral, TID PRN  sodium chloride flush 0.9 % injection 5-40 mL, 5-40 mL, IntraVENous, 2 times per day  sodium chloride flush 0.9 % injection 5-40 mL, 5-40 mL, IntraVENous, PRN  0.9 % sodium chloride infusion, , IntraVENous, PRN  insulin lispro (1 Unit Dial) 0-4 Units, 0-4 Units, SubCUTAneous, TID WC  insulin lispro (1 Unit Dial) 0-4 Units, 0-4 Units, SubCUTAneous, Nightly  glucose chewable tablet 16 g, 4 tablet, Oral, PRN  dextrose bolus 10% 125 mL, 125 mL, IntraVENous, PRN **OR** dextrose bolus 10% 250 mL, 250 mL, IntraVENous, PRN  glucagon (rDNA) injection 1 mg, 1 mg, SubCUTAneous, PRN  dextrose 10 % infusion, , IntraVENous, Continuous PRN  Facility-Administered Medications Ordered in Other Encounters: dexamethasone (DECADRON) injection 10 mg, 10 mg, IntraVENous, PRN  Allergies:  Compazine [prochlorperazine maleate]    Social History:      Social History     Socioeconomic History    Marital status: Single     Spouse name: Not on file    Number of children: Not on file    Years of education: Not on file    Highest education level: Not on file   Occupational History    Not on file   Tobacco Use    Smoking status: Former     Packs/day: 1.00     Years: 12.00     Pack years: 12.00     Types: Cigarettes     Quit date: 1982     Years since quittin.6    Smokeless tobacco: Never    Tobacco comments:     quit    Substance and Sexual Activity    Alcohol use: Yes     Comment: beer daily    Drug use: No    Sexual activity: Not Currently   Other Topics Concern    Not on file   Social History Narrative ** Merged History Encounter **          Social Determinants of Health     Financial Resource Strain: Not on file   Food Insecurity: Not on file   Transportation Needs: Not on file   Physical Activity: Not on file   Stress: Not on file   Social Connections: Not on file   Intimate Partner Violence: Not on file   Housing Stability: Not on file          Family History:         Problem Relation Age of Onset    Emphysema Mother     Other Brother          REVIEW OF SYSTEMS:      Constitutional: there has been no unanticipated weight loss. There's been no change in energy level, sleep pattern, or activity level. Eyes: No visual changes or diplopia. No scleral icterus. ENT: No Headaches, hearing loss or vertigo. No mouth sores or sore throat. Cardiovascular: No chest pain, dyspnea on exertion, palpitations or loss of consciousness. No cough, hemoptysis, pleuritic pain, or phlebitis. Respiratory: No cough or wheezing, no sputum production. No hematemesis. Gastrointestinal: No abdominal pain, appetite loss, blood in stools. No change in bowel or bladder habits. Genitourinary: No dysuria, trouble voiding, or hematuria. Musculoskeletal:  No gait disturbance, weakness or joint complaints. Integumentary: No rash or pruritis. Neurological: No headache, diplopia, change in muscle strength, numbness or tingling. No change in gait, balance, coordination, mood, affect, memory, mentation, behavior. Psychiatric: No anxiety, or depression. Endocrine: No temperature intolerance. No excessive thirst, fluid intake, or urination. No tremor. Hematologic/Lymphatic: No abnormal bruising or bleeding, blood clots or swollen lymph nodes. Allergic/Immunologic: No nasal congestion or hives.      PHYSICAL EXAM:      Vitals:  BP (!) 93/52   Pulse (!) 35 Comment: asymptomatic  Temp 98.1 °F (36.7 °C) (Oral)   Resp 18   Ht 6' (1.829 m)   Wt 208 lb 8.9 oz (94.6 kg)   SpO2 96%   BMI 28.29 kg/m²   Wt Readings from Last 3 Encounters:   08/02/22 208 lb 8.9 oz (94.6 kg)   06/24/22 201 lb 11.5 oz (91.5 kg)   08/07/19 194 lb (88 kg)       CONSTITUTIONAL:  awake, alert, cooperative, no apparent distress, and appears stated age NAD  EYES:  Lids and lashes normal, pupils equal, round and reactive to light, extra ocular muscles intact, sclera clear, conjunctiva normal  ENT:  Normocephalic, without obvious abnormality, atramatic, sinuses nontender on palpation, external ears without lesions, oral pharynx with moist mucus membranes, tonsils without erythema or exudates, gums normal and good dentition. NECK:  Supple, symmetrical, trachea midline, no adenopathy, thyroid symmetric, not enlarged and no tenderness, skin normal  HEMATOLOGIC/LYMPHATICS:  no cervical lymphadenopathy, no supraclavicular lymphadenopathy, no axillary lymphadenopathy and no inguinal lymphadenopathy  BACK:  Symmetric, no curvature, spinous processes are non-tender on palpation, paraspinous muscles are non-tender on palpation, no costal vertebral tenderness  LUNGS:  No increased work of breathing, good air exchange, clear to auscultation bilaterally, no crackles or wheezing  CARDIOVASCULAR:  , regular rate and rhythm, normal S1 and S2, no S3 or S4, and no murmur noted  ABDOMEN:  No scars, normal bowel sounds, soft, non-distended, non-tender, no masses palpated, no hepatosplenomegally  MUSCULOSKELETAL:  There is no redness, warmth, or swelling of the joints. Full range of motion noted. Status post left BKA. Edema in the right lower extremity. There is crusting and scabbing at the stump on the left. NEUROLOGIC:  Awake, alert, oriented to name, place and time. Cranial nerves II-XII are grossly intact. Motor is 5 out of 5 bilaterally.   SKIN:  no bruising or bleeding    DATA:        PT/INR:  No results found for: PTINR  PTT:    Lab Results   Component Value Date/Time    APTT 33.5 12/01/2014 03:35 AM     CMP:    Lab Results   Component Value Date/Time     08/02/2022 04:55 AM    K 4.1 08/02/2022 04:55 AM    K 4.4 07/31/2022 04:51 AM     08/02/2022 04:55 AM    CO2 20 08/02/2022 04:55 AM    BUN 52 08/02/2022 04:55 AM    PROT 5.1 08/02/2022 04:55 AM    PROT 7.0 07/07/2017 02:59 PM     Magnesium:    Lab Results   Component Value Date/Time    MG 1.6 06/16/2015 01:44 PM     Phosphorus:  No components found for: PO4  Calcium:  No results found for: CA  CBC:    Lab Results   Component Value Date/Time    WBC 5.0 08/02/2022 04:55 AM    RBC 3.06 08/02/2022 04:55 AM    RBC 4.70 07/07/2017 02:59 PM    HGB 10.6 08/02/2022 04:55 AM    HCT 31.1 08/02/2022 04:55 AM    .5 08/02/2022 04:55 AM    RDW 13.6 08/02/2022 04:55 AM     08/02/2022 04:55 AM     DIFF:    Lab Results   Component Value Date/Time    .5 08/02/2022 04:55 AM    RDW 13.6 08/02/2022 04:55 AM      LDH:    Lab Results   Component Value Date/Time     07/07/2017 02:59 PM     Uric Acid:  No components found for: URIC      RADIOLOGY:      CT CHEST WO CONTRAST    Result Date: 8/1/2022  EXAM: CT CHEST WITHOUT CONTRAST INDICATION: Fever and rales COMPARISON: None TECHNIQUE: CT of the chest was performed without contrast. Axial images, multiplanar reformatted images and axial maximum intensity projection images provided for review. Individualized dose optimization technique was used in order to meet ALARA standards for radiation dose reduction. In addition to vendor specific dose reduction algorithms, the dose reduction techniques vary based on the specific scanner utilized but frequently include automated exposure control, adjustment of the mA and/or kV  according to patient size, and use of iterative reconstruction technique. CONTRAST: None. FINDINGS: LUNGS AND AIRWAYS: Airways are patent. Patchy bilateral parenchymal infiltrates are seen throughout both lungs suggestive of atypical pneumonia. PLEURA: No pleural effusions or pleural thickening.  HEART AND GREAT VESSELS: Heart size is borderline prominent. Coronary calcifications are present. ADENOPATHY/MEDIASTINUM: No adenopathy. CHEST WALL / LOWER NECK: Thyroid gland is diffusely enlarged with progressive calcifications noted in lower neck. UPPER ABDOMEN: No significant abnormality. BONES: No significant abnormality. Bilateral patchy parenchymal infiltrates are seen throughout both lungs, left greater than right. The appearance is suggestive of atypical pneumonia. Recommend follow-up imaging to verify resolution and exclude underlying nodularity. Coronary calcification. Thyromegaly with calcifications noted in the lower neck. XR CHEST PORTABLE    Result Date: 7/30/2022  History: Fever. Hypotension. AP chest. COMPARISON: 11/19/2014. FINDINGS: Normal heart size. No effusion or consolidation. No acute osseous abnormality. Mediastinal contours are unremarkable. Right upper chest power injectable port with tip in the midsuperior vena cava. 1. No acute disease. VL Extremity Venous Right    Result Date: 8/2/2022  Lower Extremities DVT Study  Demographics   Patient Name       Yana Peacham   Date of Study      08/01/2022        Gender              Male   Patient Number     [id number]        Date of Birth       1950   Visit Number       [id number]         Age                 66 year(s)   Accession Number   [id number]        Room Number         0668   Corporate ID       [id number]          JEFFRY Jarrell,                                                           RVT   Ordering Physician Keshia Dawn MD, [facility]                                       Physician           Amandeep Jones MD  Procedure Type of Study:   Veins:Lower Extremities DVT Study, VL EXTREMITY VENOUS DUPLEX RIGHT. Vascular Sonographer Report  Indications for Study:Swelling.  Additional Indications:Inpatient is somewhat a poor historian, with covid, study done bedside. Patient has right leg swelling and pain for an unknown number of days. Patient has a left below knee amputation. Patient does mention having shrapnel in the right leg since fighting in [facility]. Venous Duplex Scan: B-mode imaging of the deep and superficial veins, with compression maneuvers, including color and Doppler spectral waveform analysis. Impressions Right Impression No evidence of deep or superficial venous thrombosis in the right lower extremity. Pulsatile venous flow most commonly associated with fluid volume overload. Left Impression No evidence of deep venous thrombosis in the left common femoral vein. Pulsatile venous flow most commonly associated with fluid volume overload. Conclusions   Summary   No evidence of deep vein or superficial thrombosis involving the right lower  extremity and the contralateral proximal common femoral vein. Pulsatile venous flow most commonly associated with fluid volume overload. Signature   ------------------------------------------------------------------  Electronically signed by Sera Simental MD (Interpreting  physician) on 08/02/2022 at 07:58 AM  ------------------------------------------------------------------  Patient Status:Routine. Study [facility] - Vascular Lab. Technical Quality:Adequate visualization. Risk Factors History +------------------+----------+-----------------------------------+ ! Diagnosis         ! Date      ! Comments                           ! +------------------+----------+-----------------------------------+ ! Other             ! !H/O chondroblastic osteosarcoma, OA! +------------------+----------+-----------------------------------+ ! Previous Procedure! 12/08/2014! Left BKA                           ! +------------------+----------+-----------------------------------+ ! Stroke            !          !2008                               !  +------------------+----------+-----------------------------------+ Velocities are measured in cm/s ; Diameters are measured in mm Right Lower Extremities DVT Study Measurements Right 2D Measurements +------------------------+----------+---------------+----------+ ! Location                ! Visualized! Compressibility! Thrombosis! +------------------------+----------+---------------+----------+ ! Sapheno Femoral Junction! Yes       ! Yes            ! None      ! +------------------------+----------+---------------+----------+ ! GSV Thigh               ! Yes       ! Yes            ! None      ! +------------------------+----------+---------------+----------+ ! Common Femoral          !Yes       ! Yes            ! None      ! +------------------------+----------+---------------+----------+ ! Femoral                 !Yes       ! Yes            ! None      ! +------------------------+----------+---------------+----------+ ! Prox Femoral            !Yes       ! Yes            ! None      ! +------------------------+----------+---------------+----------+ ! Mid Femoral             !Yes       ! Yes            ! None      ! +------------------------+----------+---------------+----------+ ! Dist Femoral            !Yes       ! Yes            ! None      ! +------------------------+----------+---------------+----------+ ! Deep Femoral            !Yes       ! Yes            ! None      ! +------------------------+----------+---------------+----------+ ! Popliteal               !Yes       ! Yes            ! None      ! +------------------------+----------+---------------+----------+ ! GSV Below Knee          ! Yes       ! Yes            ! None      ! +------------------------+----------+---------------+----------+ ! Gastroc                 ! Yes       ! Yes            ! None      ! +------------------------+----------+---------------+----------+ ! Soleal                  !Yes       ! Yes            ! None      ! +------------------------+----------+---------------+----------+ ! PTV                     ! Yes       ! Yes            ! None ! +------------------------+----------+---------------+----------+ ! Peroneal                !Yes       ! Yes            ! None      ! +------------------------+----------+---------------+----------+ ! GSV Calf                ! Yes       ! Yes            ! None      ! +------------------------+----------+---------------+----------+ ! SSV                     ! Yes       ! Yes            ! None      ! +------------------------+----------+---------------+----------+ Right Doppler Measurements +------------------------+---------+------+------------+ ! Location                ! Signal   !Reflux! Reflux (sec)! +------------------------+---------+------+------------+ ! Sapheno Femoral Junction! Phasic   ! No    !            ! +------------------------+---------+------+------------+ ! Common Femoral          !Pulsatile! No    !            ! +------------------------+---------+------+------------+ ! Femoral                 !Pulsatile! No    !            ! +------------------------+---------+------+------------+ ! Deep Femoral            !Pulsatile! No    !            ! +------------------------+---------+------+------------+ ! Popliteal               !Pulsatile! No    !            ! +------------------------+---------+------+------------+ Left Lower Extremities DVT Study Measurements Left 2D Measurements +--------------+----------+---------------+----------+ ! Location      ! Visualized! Compressibility! Thrombosis! +--------------+----------+---------------+----------+ ! Common Femoral!Yes       ! Yes            ! None      ! +--------------+----------+---------------+----------+ Left Doppler Measurements +--------------+---------+------+------------+ ! Location      ! Signal   !Reflux! Reflux (sec)! +--------------+---------+------+------------+ ! Common Femoral!Pulsatile! No    !            ! +--------------+---------+------+------------+      IMPRESSION/PLAN:      Osteosarcoma - He has known metastatic disease to the mediastinum with subsequent tracheoesophageal fistula. Previously had disease documented in the left axilla and cervical lymph nodes as well as a pulmonary nodule. He had responded extremely well to pembrolizumab immunotherapy as mentioned above. Last dose was April 6, 2022. This was held with worsening renal function. His disease remains in remission. Chronic kidney disease- Baseline creatinine from his last office visit on May 25, 2022 was 2.5. He had a trial of prednisone after stopping the pembrolizumab without effect. Tracheoesophageal fistula-CT currently looks consistent with COVID-pneumonia. If his pulmonary status worsens I would add therapy to cover anaerobes and therefore aspiration pneumonia with his known fistula.     Milla Javed MD  [facility]  Please contact me through [address]

## 2022-08-02 NOTE — FLOWSHEET NOTE
08/02/22 1620   Encounter Summary   Encounter Overview/Reason  Initial Encounter   Service Provided For: Patient   Referral/Consult From: Other (comment)  (I talked with the patient during our ACP conversation.)   Support System Significant other;Children   Complexity of Encounter Moderate   Begin Time 1550   End Time  1621   Total Time Calculated 31 min   Encounter    Type Initial Screen/Assessment   Advance Care Planning   Type ACP conversation; Completed AD/ACP document(s)  (The patient already has a HCPOA.)   Assessment/Intervention/Outcome   Assessment Calm;Coping   Intervention Active listening;Discussed belief system/Sabianism practices/kaylyn; Explored/Affirmed feelings, thoughts, concerns;Nurtured Hope   Outcome Coping;Encouraged;Engaged in conversation;Expressed Gratitude;Receptive   Plan and Referrals   Plan/Referrals Continue to visit, (comment)  (Continue visiting, as needed.)

## 2022-08-02 NOTE — CONSULTS
[facility]    Cardiology Consult/H&P  Consulting Cardiologist Jodi Tamayo MD , [facility] [place]  Referring Provider:  Jameel Liao    8/2/2022, 3:25 PM    Chief Complaint   Patient presents with    Fever     Fall at home, denies hitting head, family concerned for decrease in energy       Primary Cardiologist:  Asked by Denia Olivares MD/NIRAV [address] [place] [address] to evaluate and assess this patient's bradycardia    History of Present Illness:   Senia Kirk is a 66 y.o. male is being seen for evaluation of bradycardia. I reviewed his ED hospital meds and there were no signs that would cause bradycardia. Patient has not had syncope and currently his rate is at 38 bpm.  Normal AV conduction and no pauses noted. 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 Recently diagnosed as having CKD stage III and creatinine has increased some during this hospital stay. From a cardiac perspective the bradycardia is present and he has developed progressive edema off his left stump and his right leg over the past 2 to 3 months. Denies chest pains.     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 Surgical History:   has a past surgical history that includes Cholecystectomy; lipoma resection (2013); Foreign Body Removal; Colonoscopy; Leg Surgery (Left, 8/18/14); Tonsillectomy; other surgical history (Left, 10/22/2014); other surgical history (Right, 10/27/14); Tonsillectomy; Abdomen surgery; Cholecystectomy (1977); Colonoscopy; Endoscopy, colon, diagnostic; Tunneled venous port placement (Right); and Leg amputation below knee (Left, 12/08/14). Social History:   reports that he quit smoking about 40 years ago. His smoking use included cigarettes. He has a 12.00 pack-year smoking history. He has never used smokeless tobacco. He reports current alcohol use. He reports that he does not use drugs. Family History:  family history includes Emphysema in his mother; Other in his brother. Home Medications:  Prior to Admission medications    Medication Sig Start Date End Date Taking? Authorizing Provider   melatonin 10 MG CAPS capsule Take 10 mg by mouth nightly as needed    Historical Provider, MD   insulin glargine (LANTUS;BASAGLAR) 100 UNIT/ML injection pen Inject 19 Units into the skin every morning    Historical Provider, MD   diphenhydrAMINE-APAP, sleep, (TYLENOL PM EXTRA STRENGTH)  MG tablet Take 1 tablet by mouth nightly as needed for Sleep    Historical Provider, MD   atorvastatin (LIPITOR) 20 MG tablet Take 20 mg by mouth daily    Historical Provider, MD   pregabalin (LYRICA) 150 MG capsule Take 150 mg by mouth 2 times daily.     Historical Provider, MD   aspirin 81 MG tablet Take 81 mg by mouth daily    Historical Provider, MD   vitamin D (CHOLECALCIFEROL) 1000 UNIT TABS tablet Take 1,000 Units by mouth daily Indications: 2 tablets daily    Historical Provider, MD        Current Medications:  Current Facility-Administered Medications   Medication Dose Route Frequency Provider Last Rate Last Admin    sodium bicarbonate 150 mEq in dextrose 5 % 1,000 mL infusion   IntraVENous Continuous Moy Cisneros  mL/hr at 08/02/22 1230 New Bag at 08/02/22 1230    [START ON 8/3/2022] cefTRIAXone (ROCEPHIN) 1,000 mg in dextrose 5 % 50 mL IVPB mini-bag  1,000 mg IntraVENous Q24H Avtar Sims MD        dexamethasone (DECADRON) injection 6 mg  6 mg IntraVENous Q24H Avtar Sims MD   6 mg at 08/02/22 1244    pantoprazole (PROTONIX) injection 40 mg  40 mg IntraVENous Daily Avtar Sism MD   40 mg at 08/02/22 1426    saccharomyces boulardii (FLORASTOR) capsule 250 mg  250 mg Oral BID Avtar Sims MD   250 mg at 08/02/22 1244    baricitinib (OLUMIANT) tablet 1 mg  1 mg Oral Daily Avtar Sims MD   1 mg at 08/02/22 1426    azithromycin (ZITHROMAX) 500 mg in dextrose 5 % 250 mL IVPB  500 mg IntraVENous Q24H Avtar Sims MD   Stopped at 08/01/22 2130    aspirin EC tablet 81 mg  81 mg Oral Daily Cristiana Jasmine MD   81 mg at 08/02/22 6253    atorvastatin (LIPITOR) tablet 20 mg  20 mg Oral Daily Cristiana Jasmine MD   20 mg at 08/02/22 1765    diphenhydrAMINE (BENADRYL) tablet 25 mg  25 mg Oral Nightly PRN Cristiana Jasmine MD        melatonin disintegrating tablet 10 mg  10 mg Oral Nightly PRN Cristiana Jasmine MD        sodium chloride flush 0.9 % injection 5-40 mL  5-40 mL IntraVENous 2 times per day Cristiana Jasmine MD   10 mL at 08/02/22 0940    sodium chloride flush 0.9 % injection 5-40 mL  5-40 mL IntraVENous PRN Cristiana Jasmine MD        0.9 % sodium chloride infusion   IntraVENous PRN Cristiana Jasmine MD 20 mL/hr at 08/01/22 2246 10 mL at 08/01/22 2246    ondansetron (ZOFRAN-ODT) disintegrating tablet 4 mg  4 mg Oral Q8H PRN Cristiana Jasmine MD        Or    ondansetron [facility]) injection 4 mg  4 mg IntraVENous Q6H PRN Cristiana Jasmine MD        polyethylene glycol (GLYCOLAX) packet 17 g  17 g Oral Daily PRN Cristiana Jasmine MD        acetaminophen (TYLENOL) tablet 650 mg  650 mg Oral Q6H PRN Cristiana Jasmine MD   650 mg at 07/31/22 2340    Or    acetaminophen (TYLENOL) suppository 650 mg  650 mg Rectal Q6H PRN Noor Valentin Bowden MD        heparin (porcine) injection 5,000 Units  5,000 Units SubCUTAneous 3 times per day Carol Salgado MD   5,000 Units at 08/02/22 1246    benzonatate (TESSALON) capsule 100 mg  100 mg Oral TID PRN Cesar Patel MD   100 mg at 08/02/22 0107    sodium chloride flush 0.9 % injection 5-40 mL  5-40 mL IntraVENous 2 times per day Carol Salgado MD   5 mL at 07/31/22 0352    sodium chloride flush 0.9 % injection 5-40 mL  5-40 mL IntraVENous PRN Carol Salgado MD        0.9 % sodium chloride infusion   IntraVENous PRN Carol Salgado MD        insulin lispro (1 Unit Dial) 0-4 Units  0-4 Units SubCUTAneous TID WC Carol Salgado MD        insulin lispro (1 Unit Dial) 0-4 Units  0-4 Units SubCUTAneous Nightly Carol Salgado MD        glucose chewable tablet 16 g  4 tablet Oral PRN Carol Salgado MD        dextrose bolus 10% 125 mL  125 mL IntraVENous PRN Carol Salgado MD        Or    dextrose bolus 10% 250 mL  250 mL IntraVENous PRN Carol Salgado MD        glucagon (rDNA) injection 1 mg  1 mg SubCUTAneous PRN Carol Salgado MD        dextrose 10 % infusion   IntraVENous Continuous PRN Carol Salgado MD         Facility-Administered Medications Ordered in Other Encounters   Medication Dose Route Frequency Provider Last Rate Last Admin    dexamethasone (DECADRON) injection 10 mg  10 mg IntraVENous PRN Dontrell Tolentino MD   10 mg at 02/25/15 1051       Allergies:  Compazine [prochlorperazine maleate]     Review of Systems:   Constitutional: there has been no unanticipated weight loss. Eyes: No visual changes or diplopia. No scleral icterus. ENT: No Headaches, hearing loss or vertigo. No mouth sores or sore throat. Cardiovascular: Reviewed in HPI   Pulmonary:  no cough or sputum production. Gastrointestinal: No abdominal pain, appetite loss, blood in stools. No change in bowel or bladder habits. Genitourinary: No dysuria, trouble voiding, or hematuria.   Musculoskeletal:  No gait disturbance, weakness or joint complaints. Integumentary: No rash or pruritis. Endocrine: No malaise, fatigue or temperature intolerance. Hematologic/Lymphatic: No abnormal bruising or bleeding, blood clots or swollen lymph nodes. Allergic/Immunologic: No nasal congestion or hives. All other ROS are reviewed and are unremarkable. Physical Examination:    Vitals:    08/02/22 0319 08/02/22 0855 08/02/22 0924 08/02/22 1235   BP:   101/62 (!) 93/52   Pulse:  (!) 42 (!) 42 (!) 35   Resp:   18 18   Temp:   97.9 °F (36.6 °C) 98.1 °F (36.7 °C)   TempSrc:   Oral Oral   SpO2:   94% 96%   Weight: 208 lb 8.9 oz (94.6 kg)      Height:            EXAMl and General Appearance:  Healthy. And alert . HEENT: eyes and ears intact. No nasal masses  THYROID: not enlarged  LUNGS:  Clear to auscultation and percussion  HEART and VASCULAR:  The apical impulses not displaced  Heart tones are crisp and normal  Cervical veins are not engorged  The carotid upstroke is normal in amplitude and contour without delay or bruit  Peripheral pulses are symmetrical and full  There is no clubbing, cyanosis of the extremities. No peripheral edema  Femoral Arteries: 2+ and equal  Pedal Pulses:2+ and equal   ABDOMEN[de-identified]  No masses or tenderness  Liver/Spleen: No Abnormalities Noted  NEUROLOGICAL:  . Moves all extremities to command. Cranial nerves 2-12 are in tact.   PSYCHIATRIC: alert and lucid  and oriented and appropriate  SKIN: No lesions or rashes  LYMPH NODES: none enlarged            CBC with Differential:    Lab Results   Component Value Date/Time    WBC 5.0 08/02/2022 04:55 AM    RBC 3.06 08/02/2022 04:55 AM    RBC 4.70 07/07/2017 02:59 PM    HGB 10.6 08/02/2022 04:55 AM    HCT 31.1 08/02/2022 04:55 AM     08/02/2022 04:55 AM    .5 08/02/2022 04:55 AM    MCH 34.8 08/02/2022 04:55 AM    MCHC 34.2 08/02/2022 04:55 AM    RDW 13.6 08/02/2022 04:55 AM    NRBC 1 10/09/2014 03:10 AM    BANDSPCT 6 11/20/2014 03:20 AM    METASPCT 1 10/09/2014 03:10 AM LYMPHOPCT 9.3 08/02/2022 04:55 AM    LYMPHOPCT 20.3 07/07/2017 02:59 PM    MONOPCT 9.0 08/02/2022 04:55 AM    BASOPCT 0.2 08/02/2022 04:55 AM    MONOSABS 0.4 08/02/2022 04:55 AM    LYMPHSABS 0.5 08/02/2022 04:55 AM    EOSABS 0.0 08/02/2022 04:55 AM    BASOSABS 0.0 08/02/2022 04:55 AM     BMP:    Lab Results   Component Value Date/Time     08/02/2022 04:55 AM    K 4.1 08/02/2022 04:55 AM    K 4.4 07/31/2022 04:51 AM     08/02/2022 04:55 AM    CO2 20 08/02/2022 04:55 AM    BUN 52 08/02/2022 04:55 AM    LABALBU 3.3 08/02/2022 04:55 AM    CREATININE 3.9 08/02/2022 04:55 AM    CALCIUM 8.0 08/02/2022 04:55 AM    GFRAA 18 08/02/2022 04:55 AM    LABGLOM 15 08/02/2022 04:55 AM     Uric Acid:  No components found for: URIC  PT/INR:    Lab Results   Component Value Date/Time    PROTIME 15.4 08/01/2022 08:10 PM    INR 1.23 08/01/2022 08:10 PM     Last 3 Troponin:    Lab Results   Component Value Date/Time    TROPONINI <0.01 07/30/2022 07:27 PM     FLP:  No results found for: TRIG, HDL, LDLCALC, LDLDIRECT, LABVLDL    EKG: sinus bradycardia 8/1/2022 at 46 bpm.  Possible old septal infarct. Oswaldo Yang echocardiogram pending  Assessment/ Plan     Patient Active Problem List    Diagnosis Date Noted    Sepsis ([facility] 07/30/2022    Stage 3b chronic kidney disease ([facility] 06/26/2022    Cellulitis 06/24/2022    Port-A-Cath in place 08/10/2017    Left BKA 12/09/2014    Diabetes ([facility] 10/23/2014    Fever and chills 10/16/2014    Micturition syncope 10/09/2014    Chondroblastic osteosarcoma ([facility] 09/29/2014   At this time primary problem is bradycardia which seems to be relatively asymptomatic for this patient. Also having peripheral edema which is concerning for him in the presence of CKD level 3. I do not see pauses I do not see his bradycardia lower than 38/min. Will get echocardiogram.  Continue to monitor the patient. Make sure we do not use any negative chronotropic drugs.   Kidney docs are seeing him and will determine follow-up as far as his edema and his CKD. We will follow. Thanks for allowing me the opportunity  to participate in the evaluation and care of your patients.  Please call if we can assist further [phone]    This chart was likely completed using voice recognition technology and may contain unintended grammatical , phraseology,and/or punctuation errors  Yelitza Harper MD , [facility] [place]  8/2/20223:25 PM

## 2022-08-02 NOTE — PROGRESS NOTES
[facility]    [facility]              [phone]                      Interval History and Plan:    Patient seen with nurse   Feel fine  BP soft but better  HR in 40 s  750 ml of urine documented  Cr trending up  Bicarbonate 20  K fine  No SOB on 2 liter oxygen  Patient having diarrhea  Denied any urinary problem. Does not have Jones       Change fluid to bicarbonate infusion. D/C midodrine as hear rate in 40 s and in case of hypotension, please give IVF boluses  Check CK  Check kidney ultrasound  Check bladder scan  Q Shift and straight cath as needed for urinary retention. Monitor heart rate and BP and consider cardiology consult  Trend vitals and I/O and labs closely  No urgent indication for dialysis but patient is close to it. D/W patient and nurse   Pt agree. Assessment :     Acute on chronic kidney disease stage IV:  Baseline creatinine has been around 2.4. Creatinine at the time of admission was 2.9. Patient's chronic kidney disease worse thought secondary to underlying diabetes with progressive disease along with Immunotherapy and previous diuretic use  UA: 0- 2 RBC, 3- 4 WBC  Neg protein on UA  US: None this admission. Acute decompensation is likely related to relative hypotension      Has a history of AIN from [employer]tr[employer]    Hypotension. Monitor   On IVF  Stopped Midodrine due to bradycardia.      Type 2 diabetes mellitus  Metastatic osteosarcoma  Neuropathy      [facility] would like to thank Stefani Little MD   for opportunity to serve this patient      Please call with questions at-   24 Hrs Answering service [phone] or  7 am- 5 pm via Perfect serve or cell phone  Christiano Ross MD          CC/reason for consult :     MILEY/CKD     HPI :     Brandon Penn is a 66 y.o. male ppast medical history significant for diabetes, chondroblastic osteosarcoma of left lower extremity s/p BKA in the past along with history of metastatic disease to lung and head and neck previously exposed to [place] by [facility] along with history of some degree of chronic kidney disease in the past does not follow with a nephrologist there is some paucity of labs between 2020 and 2022 with 1 labs in the Care Everywhere was admitted in June 2022 with swelling and was found to have significant edema. Along with worsening kidney function. Patient was discharged with outpatient follow-up he did not make it to any of his outpatient follow-ups. Presented to the hospital again this time with fever. Patient's temperature was running around 120s. Onset of the symptoms several progressively got worse associated with significant weakness and a fall. Nothing was making the symptoms better or worse. At the time of presentation to emergency room he was found to have hypotension with fever along with tachycardia. Was admitted to hospital for further evaluation and management. He was also found to have worsening Creatinine  Presents to the hospital again this time with significant chronic kidney disease with mild worsening of the kidney function. Given the patient's chronic kidney disease with worsening creatinine nephrology is been consulted to assist with care    ROS:       positives in bold   Constitutional:  fever, chills, weakness, weight change, fatigue  Skin:  rash, pruritus, hair loss, bruising, dry skin, petechiae  Head, Face, Neck   headaches, swelling,  cervical adenopathy  Respiratory: shortness of breath, cough, or wheezing  Cardiovascular: chest pain, palpitations, dizzy, edema  Gastrointestinal: nausea, vomiting, diarrhea, constipation,belly pain    Yellow skin, blood in stool  Musculoskeletal:  back pain, muscle weakness, gait problems,       joint pain or swelling.   Genitourinary:  dysuria, poor urine flow, flank pain, blood in urine  Neurologic:  vertigo, TIA'S, syncope, seizures, focal weakness  Psychosocial:  insomnia, anxiety, or depression. Additional positive findings:                          All other remaining systems are negative. PMH/PSH/SH/Family History:     Past Medical History:   Diagnosis Date    Arthritis     left knee and hands bilateral    Chondroblastic osteosarcoma ([facility] 2014    Left distal tibia    Diabetes mellitus ([facility]     type 2 1999    Hyperlipidemia     Unspecified cerebral artery occlusion with cerebral infarction 2008       Past Surgical History:   Procedure Laterality Date    ABDOMEN SURGERY      CHOLECYSTECTOMY      CHOLECYSTECTOMY      Laparoscopic    COLONOSCOPY      COLONOSCOPY      X3.  Last one 2014    ENDOSCOPY, COLON, DIAGNOSTIC      Upper GI prior to Cholecystectomy    FOREIGN BODY REMOVAL      LEG AMPUTATION BELOW KNEE Left 14    LEG SURGERY Left 14    biopsy left lower leg    LIPOMA RESECTION      OTHER SURGICAL HISTORY Left 10/22/2014    LEFT SUBCLAVIAN PORT REMOVAL              OTHER SURGICAL HISTORY Right 10/27/14    INSERTION OF SINGLE LUMEN PORT A CATH    TONSILLECTOMY      TONSILLECTOMY      childhood    TUNNELED VENOUS PORT PLACEMENT Right     Power Port       Social History     Socioeconomic History    Marital status: Single     Spouse name: Not on file    Number of children: Not on file    Years of education: Not on file    Highest education level: Not on file   Occupational History    Not on file   Tobacco Use    Smoking status: Former     Packs/day: 1.00     Years: 12.00     Pack years: 12.00     Types: Cigarettes     Quit date: 1982     Years since quittin.6    Smokeless tobacco: Never    Tobacco comments:     quit    Substance and Sexual Activity    Alcohol use: Yes     Comment: beer daily    Drug use: No    Sexual activity: Not Currently   Other Topics Concern    Not on file   Social History Narrative    ** Merged History Encounter **          Social Determinants of Health     Financial Resource Strain: Not on file   Food Insecurity: Not on file   Transportation Needs: Not on file   Physical Activity: Not on file   Stress: Not on file   Social Connections: Not on file   Intimate Partner Violence: Not on file   Housing Stability: Not on file           Problem Relation Age of Onset    Emphysema Mother     Other Brother           Medication:      midodrine  10 mg Oral TID WC    azithromycin  500 mg IntraVENous Q24H    aspirin  81 mg Oral Daily    atorvastatin  20 mg Oral Daily    sodium chloride flush  5-40 mL IntraVENous 2 times per day    heparin (porcine)  5,000 Units SubCUTAneous 3 times per day    ceftaroline fosamil (TEFLARO) IVPB  300 mg IntraVENous Q12H    sodium chloride flush  5-40 mL IntraVENous 2 times per day    insulin lispro  0-4 Units SubCUTAneous TID WC    insulin lispro  0-4 Units SubCUTAneous Nightly     diphenhydrAMINE, melatonin, sodium chloride flush, sodium chloride, ondansetron **OR** ondansetron, polyethylene glycol, acetaminophen **OR** acetaminophen, benzonatate, sodium chloride flush, sodium chloride, glucose, dextrose bolus **OR** dextrose bolus, glucagon (rDNA), dextrose       Vitals :     /64   Pulse (!) 48   Temp 98.6 °F (37 °C) (Oral)   Resp 20   Ht 6' (1.829 m)   Wt 208 lb 8.9 oz (94.6 kg)   SpO2 92%   BMI 28.29 kg/m²     I & O :       Intake/Output Summary (Last 24 hours) at 8/2/2022 0749  Last data filed at 8/2/2022 9508  Gross per 24 hour   Intake 920 ml   Output 700 ml   Net 220 ml          Physical Examination :     General appearance: Anxious- no, distressed- no, in good spirits- yes  HEENT: Lips- normal, teeth- ok , oral mucosa- moist  Neck : Mass- no, appears symmetrical, JVD- not visible  Respiratory: Respiratory effort- , wheeze- no, crackles - no  Cardiovascular:  Ausculation- S1S2, Edema mild  Abdomen: visible mass- no, distention- no, scar- no, tenderness- no                            hepatosplenomegaly-  no  Musculoskeletal:  clubbing no,cyanosis- no , digital ischemia- no muscle strength- grossly normal , tone - grossly normal  Right BKA   Skin: rashes- no , ulcers- no, induration- no, tightening - no  Psychiatric:  mood stable effect normal   CNS: AAO x 3      LABS:     Recent Labs     07/31/22 0451 08/01/22 0440 08/02/22 0455   WBC 4.6 6.1 5.0   HGB 11.3* 11.3* 10.6*   HCT 32.9* 32.2* 31.1*    145 135       Recent Labs     07/31/22 0451 08/01/22 0440 08/02/22 0455    138 139   K 4.4  4.4 3.9 4.1    105 105   CO2 24 19* 20*   BUN 36* 44* 52*   CREATININE 2.9* 3.5* 3.9*   GLUCOSE 105* 139* 146*   PHOS 3.2 3.6 4.3

## 2022-08-02 NOTE — PLAN OF CARE
Problem: Respiratory - Adult  Goal: Achieves optimal ventilation and oxygenation  Outcome: Not Progressing  Flowsheets (Taken 8/2/2022 0259)  Achieves optimal ventilation and oxygenation:   Assess for changes in respiratory status   Assess for changes in mentation and behavior   Position to facilitate oxygenation and minimize respiratory effort   Oxygen supplementation based on oxygen saturation or arterial blood gases  Note: Pt required 2L NC thus far this shift in order to maintain SPo2 > 92%. Pt was 87% on room air with midnight vitals (see flowsheets). Pt has had no complaints of shortness of breath/dyspnea. Thus far this shift. Problem: Cardiovascular - Adult  Goal: Maintains optimal cardiac output and hemodynamic stability  Outcome: Not Progressing  Flowsheets (Taken 8/2/2022 0257)  Maintains optimal cardiac output and hemodynamic stability:   Monitor blood pressure and heart rate   Monitor urine output and notify Licensed Independent Practitioner for values outside of normal range   Assess for signs of decreased cardiac output  Note: Thus far this shift pt has remained sinus bradycardia with heart rate consistently in the 30's. Pt has remained asymptomatic thus far. BP has remained stable.

## 2022-08-02 NOTE — PROGRESS NOTES
EKG completed and placed in pt's chart. On-call resident notified.  Electronically signed by Thompson Tejeda RN on 8/2/2022 at 2:24 AM

## 2022-08-02 NOTE — CARE COORDINATION
Case Management Assessment           Daily Note                 Date/ Time of Note: 8/2/2022 10:02 AM         Note completed by: Pablo Regalado RN    Patient Name: Mike Cordero  YOB: 1950    Diagnosis:Fever and chills [R50.9]  Septicemia ([address] [A41.9]  Sepsis ([address] [A41.9]  Patient Admission Status: Inpatient    Date of Admission:7/30/2022  6:27 PM Length of Stay: 3 GLOS: GMLOS: 4.8    Current Plan of Care: IV abx, 2L O2  ________________________________________________________________________________________  PT AM-PAC: 9 / 24 per last evaluation on: 8/1    OT AM-PAC: 16 / 24 per last evaluation on: 8/1    DME Needs for discharge: tbd   ________________________________________________________________________________________  Discharge Plan: Home with Home Health Care: [facility]    Tentative discharge date: tbd     Current barriers to discharge: medical clearance      ________________________________________________________________________________________  Case Management Notes:  Patient is from home alone, has help from family, is active with [facility]. Patient declines SNF placement. Will continue to follow. Ayesha Waterman and his family were provided with choice of provider; he and his family are in agreement with the discharge plan.     Care Transition Patient: Lian Regalado RN  [facility]  Case Management Department  Ph: [phone]  Fax: [phone]

## 2022-08-02 NOTE — PLAN OF CARE
Problem: Discharge Planning  Goal: Discharge to home or other facility with appropriate resources  Outcome: Progressing  Flowsheets (Taken 8/2/2022 0855)  Discharge to home or other facility with appropriate resources: Identify barriers to discharge with patient and caregiver     Problem: Skin/Tissue Integrity  Goal: Absence of new skin breakdown  Description: 1. Monitor for areas of redness and/or skin breakdown  2. Assess vascular access sites hourly  3. Every 4-6 hours minimum:  Change oxygen saturation probe site  4. Every 4-6 hours:  If on nasal continuous positive airway pressure, respiratory therapy assess nares and determine need for appliance change or resting period. Outcome: Progressing  Note: Assisting with [id number] to prevent skin breakdown     Problem: Safety - Adult  Goal: Free from fall injury  Flowsheets  Taken 8/2/2022 1955  Free From Fall Injury: Instruct family/caregiver on patient safety  Taken 8/2/2022 1254  Free From Fall Injury: Instruct family/caregiver on patient safety  Note: Educated on falls precautions and interventions used to promote patient safety.       Problem: Pain  Goal: Verbalizes/displays adequate comfort level or baseline comfort level  Flowsheets  Taken 8/2/2022 1955  Verbalizes/displays adequate comfort level or baseline comfort level:   Encourage patient to monitor pain and request assistance   Administer analgesics based on type and severity of pain and evaluate response   Implement non-pharmacological measures as appropriate and evaluate response   Assess pain using appropriate pain scale   Notify Licensed Independent Practitioner if interventions unsuccessful or patient reports new pain  Taken 8/2/2022 1656  Verbalizes/displays adequate comfort level or baseline comfort level: Encourage patient to monitor pain and request assistance  Taken 8/2/2022 1235  Verbalizes/displays adequate comfort level or baseline comfort level: Encourage patient to monitor pain and request assistance  Taken 8/2/2022 0229  Verbalizes/displays adequate comfort level or baseline comfort level: Encourage patient to monitor pain and request assistance  Note: Educated on pharmacological and non-pharmacological interventions to assist with pain.

## 2022-08-03 ENCOUNTER — APPOINTMENT (OUTPATIENT)
Dept: ULTRASOUND IMAGING | Age: 72
DRG: 853 | End: 2022-08-03
Payer: OTHER GOVERNMENT

## 2022-08-03 ENCOUNTER — APPOINTMENT (OUTPATIENT)
Dept: GENERAL RADIOLOGY | Age: 72
DRG: 853 | End: 2022-08-03
Payer: OTHER GOVERNMENT

## 2022-08-03 ENCOUNTER — PROCEDURE VISIT (OUTPATIENT)
Dept: CARDIOLOGY CLINIC | Age: 72
End: 2022-08-03

## 2022-08-03 DIAGNOSIS — Z95.0 PACEMAKER: Primary | ICD-10-CM

## 2022-08-03 PROBLEM — I49.5 SSS (SICK SINUS SYNDROME) (HCC): Status: ACTIVE | Noted: 2022-08-03

## 2022-08-03 PROBLEM — A41.9 SEPTICEMIA (HCC): Status: ACTIVE | Noted: 2022-08-03

## 2022-08-03 PROBLEM — R00.1 BRADYCARDIA: Status: ACTIVE | Noted: 2022-08-03

## 2022-08-03 LAB
A/G RATIO: 1.5 (ref 1.1–2.2)
A/G RATIO: 1.9 (ref 1.1–2.2)
ALBUMIN SERPL-MCNC: 3.3 G/DL (ref 3.4–5)
ALP BLD-CCNC: 51 U/L (ref 40–129)
ALP BLD-CCNC: 54 U/L (ref 40–129)
ALT SERPL-CCNC: 27 U/L (ref 10–40)
ALT SERPL-CCNC: 30 U/L (ref 10–40)
ANION GAP SERPL CALCULATED.3IONS-SCNC: 12 MMOL/L (ref 3–16)
AST SERPL-CCNC: 43 U/L (ref 15–37)
AST SERPL-CCNC: 59 U/L (ref 15–37)
BASOPHILS ABSOLUTE: 0 K/UL (ref 0–0.2)
BASOPHILS RELATIVE PERCENT: 0.1 %
BILIRUB SERPL-MCNC: 0.4 MG/DL (ref 0–1)
BILIRUB SERPL-MCNC: 0.6 MG/DL (ref 0–1)
BUN BLDV-MCNC: 60 MG/DL (ref 7–20)
CALCIUM SERPL-MCNC: 8 MG/DL (ref 8.3–10.6)
CHLORIDE BLD-SCNC: 100 MMOL/L (ref 99–110)
CO2: 25 MMOL/L (ref 21–32)
CREAT SERPL-MCNC: 4.2 MG/DL (ref 0.8–1.3)
EOSINOPHILS ABSOLUTE: 0 K/UL (ref 0–0.6)
EOSINOPHILS RELATIVE PERCENT: 0.1 %
GFR AFRICAN AMERICAN: 17
GFR NON-AFRICAN AMERICAN: 14
GLUCOSE BLD-MCNC: 232 MG/DL (ref 70–99)
GLUCOSE BLD-MCNC: 272 MG/DL (ref 70–99)
GLUCOSE BLD-MCNC: 288 MG/DL (ref 70–99)
GLUCOSE BLD-MCNC: 291 MG/DL (ref 70–99)
GLUCOSE BLD-MCNC: 298 MG/DL (ref 70–99)
HCT VFR BLD CALC: 30.9 % (ref 40.5–52.5)
HEMOGLOBIN: 10.5 G/DL (ref 13.5–17.5)
LV EF: 48 %
LVEF MODALITY: NORMAL
LYMPHOCYTES ABSOLUTE: 0.3 K/UL (ref 1–5.1)
LYMPHOCYTES RELATIVE PERCENT: 12.8 %
MAGNESIUM: 1.8 MG/DL (ref 1.8–2.4)
MAGNESIUM: 2 MG/DL (ref 1.8–2.4)
MCH RBC QN AUTO: 34.2 PG (ref 26–34)
MCHC RBC AUTO-ENTMCNC: 33.9 G/DL (ref 31–36)
MCV RBC AUTO: 101.1 FL (ref 80–100)
MONOCYTES ABSOLUTE: 0.3 K/UL (ref 0–1.3)
MONOCYTES RELATIVE PERCENT: 11.1 %
MRSA SCREEN RT-PCR: NORMAL
NEUTROPHILS ABSOLUTE: 1.8 K/UL (ref 1.7–7.7)
NEUTROPHILS RELATIVE PERCENT: 75.9 %
PDW BLD-RTO: 13.4 % (ref 12.4–15.4)
PERFORMED ON: ABNORMAL
PHOSPHORUS: 4.3 MG/DL (ref 2.5–4.9)
PLATELET # BLD: 121 K/UL (ref 135–450)
PMV BLD AUTO: 9 FL (ref 5–10.5)
POTASSIUM REFLEX MAGNESIUM: 3.9 MMOL/L (ref 3.5–5.1)
POTASSIUM SERPL-SCNC: 4.3 MMOL/L (ref 3.5–5.1)
RBC # BLD: 3.05 M/UL (ref 4.2–5.9)
SODIUM BLD-SCNC: 137 MMOL/L (ref 136–145)
TOTAL PROTEIN: 5 G/DL (ref 6.4–8.2)
TOTAL PROTEIN: 5.4 G/DL (ref 6.4–8.2)
TSH REFLEX: 0.42 UIU/ML (ref 0.27–4.2)
VITAMIN D 25-HYDROXY: 49.5 NG/ML
WBC # BLD: 2.4 K/UL (ref 4–11)

## 2022-08-03 PROCEDURE — 80053 COMPREHEN METABOLIC PANEL: CPT

## 2022-08-03 PROCEDURE — 2500000003 HC RX 250 WO HCPCS

## 2022-08-03 PROCEDURE — 80061 LIPID PANEL: CPT

## 2022-08-03 PROCEDURE — 02HK3JZ INSERTION OF PACEMAKER LEAD INTO RIGHT VENTRICLE, PERCUTANEOUS APPROACH: ICD-10-PCS | Performed by: INTERNAL MEDICINE

## 2022-08-03 PROCEDURE — C1898 LEAD, PMKR, OTHER THAN TRANS: HCPCS

## 2022-08-03 PROCEDURE — 2580000003 HC RX 258

## 2022-08-03 PROCEDURE — 99223 1ST HOSP IP/OBS HIGH 75: CPT | Performed by: INTERNAL MEDICINE

## 2022-08-03 PROCEDURE — C1894 INTRO/SHEATH, NON-LASER: HCPCS

## 2022-08-03 PROCEDURE — 99233 SBSQ HOSP IP/OBS HIGH 50: CPT | Performed by: NURSE PRACTITIONER

## 2022-08-03 PROCEDURE — 82306 VITAMIN D 25 HYDROXY: CPT

## 2022-08-03 PROCEDURE — 6370000000 HC RX 637 (ALT 250 FOR IP)

## 2022-08-03 PROCEDURE — 71045 X-RAY EXAM CHEST 1 VIEW: CPT

## 2022-08-03 PROCEDURE — 85025 COMPLETE CBC W/AUTO DIFF WBC: CPT

## 2022-08-03 PROCEDURE — 6370000000 HC RX 637 (ALT 250 FOR IP): Performed by: INTERNAL MEDICINE

## 2022-08-03 PROCEDURE — 2709999900 HC NON-CHARGEABLE SUPPLY

## 2022-08-03 PROCEDURE — 94761 N-INVAS EAR/PLS OXIMETRY MLT: CPT

## 2022-08-03 PROCEDURE — 6360000002 HC RX W HCPCS: Performed by: INTERNAL MEDICINE

## 2022-08-03 PROCEDURE — 83735 ASSAY OF MAGNESIUM: CPT

## 2022-08-03 PROCEDURE — C9113 INJ PANTOPRAZOLE SODIUM, VIA: HCPCS | Performed by: INTERNAL MEDICINE

## 2022-08-03 PROCEDURE — 6360000004 HC RX CONTRAST MEDICATION: Performed by: INTERNAL MEDICINE

## 2022-08-03 PROCEDURE — 6360000002 HC RX W HCPCS

## 2022-08-03 PROCEDURE — 76770 US EXAM ABDO BACK WALL COMP: CPT

## 2022-08-03 PROCEDURE — 6370000000 HC RX 637 (ALT 250 FOR IP): Performed by: STUDENT IN AN ORGANIZED HEALTH CARE EDUCATION/TRAINING PROGRAM

## 2022-08-03 PROCEDURE — 33208 INSRT HEART PM ATRIAL & VENT: CPT

## 2022-08-03 PROCEDURE — 84100 ASSAY OF PHOSPHORUS: CPT

## 2022-08-03 PROCEDURE — 2060000000 HC ICU INTERMEDIATE R&B

## 2022-08-03 PROCEDURE — 33208 INSRT HEART PM ATRIAL & VENT: CPT | Performed by: INTERNAL MEDICINE

## 2022-08-03 PROCEDURE — 36415 COLL VENOUS BLD VENIPUNCTURE: CPT

## 2022-08-03 PROCEDURE — C1785 PMKR, DUAL, RATE-RESP: HCPCS

## 2022-08-03 PROCEDURE — 84443 ASSAY THYROID STIM HORMONE: CPT

## 2022-08-03 PROCEDURE — C8929 TTE W OR WO FOL WCON,DOPPLER: HCPCS

## 2022-08-03 PROCEDURE — 99233 SBSQ HOSP IP/OBS HIGH 50: CPT | Performed by: INTERNAL MEDICINE

## 2022-08-03 PROCEDURE — 0JH606Z INSERTION OF PACEMAKER, DUAL CHAMBER INTO CHEST SUBCUTANEOUS TISSUE AND FASCIA, OPEN APPROACH: ICD-10-PCS | Performed by: INTERNAL MEDICINE

## 2022-08-03 PROCEDURE — 2580000003 HC RX 258: Performed by: INTERNAL MEDICINE

## 2022-08-03 PROCEDURE — 2700000000 HC OXYGEN THERAPY PER DAY

## 2022-08-03 PROCEDURE — 02H63JZ INSERTION OF PACEMAKER LEAD INTO RIGHT ATRIUM, PERCUTANEOUS APPROACH: ICD-10-PCS | Performed by: INTERNAL MEDICINE

## 2022-08-03 RX ORDER — SODIUM CHLORIDE 0.9 % (FLUSH) 0.9 %
5-40 SYRINGE (ML) INJECTION EVERY 12 HOURS SCHEDULED
Status: DISCONTINUED | OUTPATIENT
Start: 2022-08-03 | End: 2022-08-08 | Stop reason: HOSPADM

## 2022-08-03 RX ORDER — INSULIN LISPRO 100 [IU]/ML
0-8 INJECTION, SOLUTION INTRAVENOUS; SUBCUTANEOUS
Status: DISCONTINUED | OUTPATIENT
Start: 2022-08-03 | End: 2022-08-04

## 2022-08-03 RX ORDER — SODIUM CHLORIDE 9 MG/ML
INJECTION, SOLUTION INTRAVENOUS PRN
Status: DISCONTINUED | OUTPATIENT
Start: 2022-08-03 | End: 2022-08-08 | Stop reason: HOSPADM

## 2022-08-03 RX ORDER — INSULIN LISPRO 100 [IU]/ML
0-4 INJECTION, SOLUTION INTRAVENOUS; SUBCUTANEOUS NIGHTLY
Status: DISCONTINUED | OUTPATIENT
Start: 2022-08-03 | End: 2022-08-04

## 2022-08-03 RX ORDER — SODIUM CHLORIDE 0.9 % (FLUSH) 0.9 %
5-40 SYRINGE (ML) INJECTION PRN
Status: DISCONTINUED | OUTPATIENT
Start: 2022-08-03 | End: 2022-08-08 | Stop reason: HOSPADM

## 2022-08-03 RX ADMIN — ASPIRIN 81 MG: 81 TABLET, COATED ORAL at 09:34

## 2022-08-03 RX ADMIN — PERFLUTREN 1.65 MG: 6.52 INJECTION, SUSPENSION INTRAVENOUS at 09:49

## 2022-08-03 RX ADMIN — INSULIN LISPRO 2 UNITS: 100 INJECTION, SOLUTION INTRAVENOUS; SUBCUTANEOUS at 18:06

## 2022-08-03 RX ADMIN — HEPARIN SODIUM 5000 UNITS: 5000 INJECTION INTRAVENOUS; SUBCUTANEOUS at 05:46

## 2022-08-03 RX ADMIN — BENZONATATE 100 MG: 100 CAPSULE ORAL at 01:10

## 2022-08-03 RX ADMIN — DEXAMETHASONE SODIUM PHOSPHATE 6 MG: 4 INJECTION, SOLUTION INTRAMUSCULAR; INTRAVENOUS at 13:48

## 2022-08-03 RX ADMIN — Medication 250 MG: at 21:11

## 2022-08-03 RX ADMIN — PANTOPRAZOLE SODIUM 40 MG: 40 INJECTION, POWDER, FOR SOLUTION INTRAVENOUS at 09:34

## 2022-08-03 RX ADMIN — INSULIN GLARGINE 12 UNITS: 100 INJECTION, SOLUTION SUBCUTANEOUS at 11:17

## 2022-08-03 RX ADMIN — SODIUM CHLORIDE, PRESERVATIVE FREE 10 ML: 5 INJECTION INTRAVENOUS at 21:11

## 2022-08-03 RX ADMIN — SODIUM CHLORIDE, PRESERVATIVE FREE 10 ML: 5 INJECTION INTRAVENOUS at 09:34

## 2022-08-03 RX ADMIN — CEFTRIAXONE 1000 MG: 1 INJECTION, POWDER, FOR SOLUTION INTRAMUSCULAR; INTRAVENOUS at 10:06

## 2022-08-03 RX ADMIN — HEPARIN SODIUM 5000 UNITS: 5000 INJECTION INTRAVENOUS; SUBCUTANEOUS at 21:11

## 2022-08-03 RX ADMIN — BARICITINIB 1 MG: 2 TABLET, FILM COATED ORAL at 09:34

## 2022-08-03 RX ADMIN — INSULIN LISPRO 4 UNITS: 100 INJECTION, SOLUTION INTRAVENOUS; SUBCUTANEOUS at 13:43

## 2022-08-03 RX ADMIN — AZITHROMYCIN DIHYDRATE 500 MG: 500 INJECTION, POWDER, LYOPHILIZED, FOR SOLUTION INTRAVENOUS at 18:30

## 2022-08-03 RX ADMIN — Medication 250 MG: at 09:35

## 2022-08-03 RX ADMIN — ATORVASTATIN CALCIUM 20 MG: 20 TABLET, FILM COATED ORAL at 09:35

## 2022-08-03 ASSESSMENT — ENCOUNTER SYMPTOMS
SHORTNESS OF BREATH: 0
CHEST TIGHTNESS: 0
WHEEZING: 0
DIARRHEA: 1
COUGH: 1
ABDOMINAL PAIN: 0

## 2022-08-03 ASSESSMENT — PAIN SCALES - GENERAL
PAINLEVEL_OUTOF10: 0
PAINLEVEL_OUTOF10: 0

## 2022-08-03 NOTE — PROGRESS NOTES
Pt's heart rate has been as low as 29 thus far this shift. Pt has remained asymptomatic thus far. On-call resident notified.  Electronically signed by Abril Moreno RN on 8/2/2022 at 10:49 PM

## 2022-08-03 NOTE — PROGRESS NOTES
Pt's heart rate 25, pt asymptomatic. Vitals otherwise stable. On-call cardiologist paged. Awaiting call back.  Electronically signed by Lexus Stubbs RN on 8/3/2022 at 1:33 AM

## 2022-08-03 NOTE — PLAN OF CARE
Problem: Discharge Planning  Goal: Discharge to home or other facility with appropriate resources  Outcome: Progressing  Flowsheets (Taken 8/2/2022 0855 by Yuli Golden RN)  Discharge to home or other facility with appropriate resources: Identify barriers to discharge with patient and caregiver     Problem: Skin/Tissue Integrity  Goal: Absence of new skin breakdown  Description: 1. Monitor for areas of redness and/or skin breakdown  2. Assess vascular access sites hourly  3. Every 4-6 hours minimum:  Change oxygen saturation probe site  4. Every 4-6 hours:  If on nasal continuous positive airway pressure, respiratory therapy assess nares and determine need for appliance change or resting period.   Outcome: Progressing     Problem: Safety - Adult  Goal: Free from fall injury  Outcome: Progressing  Flowsheets (Taken 8/3/2022 1949)  Free From Fall Injury: Instruct family/caregiver on patient safety     Problem: Pain  Goal: Verbalizes/displays adequate comfort level or baseline comfort level  Outcome: Progressing  Flowsheets  Taken 8/3/2022 1758  Verbalizes/displays adequate comfort level or baseline comfort level: Encourage patient to monitor pain and request assistance  Taken 8/3/2022 0952  Verbalizes/displays adequate comfort level or baseline comfort level: Encourage patient to monitor pain and request assistance     Problem: ABCDS Injury Assessment  Goal: Absence of physical injury  Outcome: Progressing  Flowsheets  Taken 8/3/2022 1950  Absence of Physical Injury: Implement safety measures based on patient assessment  Taken 8/3/2022 1949  Absence of Physical Injury: Implement safety measures based on patient assessment     Problem: Neurosensory - Adult  Goal: Achieves stable or improved neurological status  Outcome: Progressing  Flowsheets (Taken 8/3/2022 0952)  Achieves stable or improved neurological status: Assess for and report changes in neurological status  Goal: Absence of seizures  Outcome: Progressing  Goal: Remains free of injury related to seizures activity  Outcome: Progressing  Goal: Achieves maximal functionality and self care  Outcome: Progressing     Problem: Cardiovascular - Adult  Goal: Maintains optimal cardiac output and hemodynamic stability  Outcome: Progressing  Flowsheets (Taken 8/3/2022 0952)  Maintains optimal cardiac output and hemodynamic stability: Monitor blood pressure and heart rate  Goal: Absence of cardiac dysrhythmias or at baseline  Outcome: Progressing  Flowsheets (Taken 8/3/2022 0952)  Absence of cardiac dysrhythmias or at baseline: Monitor cardiac rate and rhythm     Problem: Respiratory - Adult  Goal: Achieves optimal ventilation and oxygenation  Outcome: Progressing  Flowsheets (Taken 8/2/2022 0259 by Vania Juan RN)  Achieves optimal ventilation and oxygenation:   Assess for changes in respiratory status   Assess for changes in mentation and behavior   Position to facilitate oxygenation and minimize respiratory effort   Oxygen supplementation based on oxygen saturation or arterial blood gases     Problem: Gastrointestinal - Adult  Goal: Minimal or absence of nausea and vomiting  Outcome: Progressing  Goal: Maintains or returns to baseline bowel function  Outcome: Progressing  Goal: Maintains adequate nutritional intake  Outcome: Progressing  Goal: Establish and maintain optimal ostomy function  Outcome: Progressing     Problem: Skin/Tissue Integrity - Adult  Goal: Skin integrity remains intact  Outcome: Progressing  Flowsheets  Taken 8/3/2022 1949  Skin Integrity Remains Intact: Monitor for areas of redness and/or skin breakdown  Taken 8/3/2022 5999  Skin Integrity Remains Intact: Monitor for areas of redness and/or skin breakdown  Goal: Incisions, wounds, or drain sites healing without S/S of infection  Outcome: Progressing  Flowsheets (Taken 8/3/2022 1949)  Incisions, Wounds, or Drain Sites Healing Without Sign and Symptoms of Infection:   ADMISSION and DAILY: Assess and document risk factors for pressure ulcer development   TWICE DAILY: Assess and document skin integrity  Goal: Oral mucous membranes remain intact  Outcome: Progressing

## 2022-08-03 NOTE — PROGRESS NOTES
[address]   Cardiology  Note   Dr Lukas Rico MD, Fela Martinez RN, FNP APRN CVNP  Date: 8/3/2022  Admit Date: 7/30/2022       CC: fever   COVID-pneumonia  Cardiology consult for bradycardia     PMH: Chondroblastic osteosarcoma left leg 2014   history of osteosarcoma. He is status posttreatment as shown above. 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 No major events overnight. Denies having chest pain, palpitations, shortness of breath, orthopnea/PND, cough, or dizziness at the time of this visit. Us renals pending /  TTE pending   Do magnesium TSH vit D and lipid level today     Patient seen and examined. Clinical notes reviewed.  Telemetry reviewed / Pertinent labs, diagnostic, device, and imaging results reviewed as a part of this visit  I spent a total of 35 minutes and greater than 50% of the time was spent counseling with patient  coordinating care regarding her diagnosis, treatments and plan of care      Scheduled Meds:   insulin lispro  0-8 Units SubCUTAneous TID WC    insulin lispro  0-4 Units SubCUTAneous Nightly    insulin glargine  12 Units SubCUTAneous Daily    cefTRIAXone (ROCEPHIN) IV  1,000 mg IntraVENous Q24H    dexamethasone  6 mg IntraVENous Q24H    pantoprazole  40 mg IntraVENous Daily    saccharomyces boulardii  250 mg Oral BID    baricitinib  1 mg Oral Daily    azithromycin  500 mg IntraVENous Q24H    aspirin  81 mg Oral Daily    atorvastatin  20 mg Oral Daily    sodium chloride flush  5-40 mL IntraVENous 2 times per day    heparin (porcine)  5,000 Units SubCUTAneous 3 times per day    sodium chloride flush  5-40 mL IntraVENous 2 times per day       Vitals:    08/03/22 0952   BP: (!) 118/58   Pulse: (!) 38   Resp: 18   Temp: 97.8 °F (36.6 °C)   SpO2: 93%      In: 680 [P.O.:660; I.V.:20]  Out: 1200    Wt Readings from Last 3 Encounters:   08/03/22 209 lb 14.1 oz (95.2 kg)   06/24/22 201 lb 11.5 oz (91.5 kg)   08/07/19 194 lb (88 kg)       Intake/Output Summary (Last 24 hours) at 8/3/2022 1126  Last data filed at 8/3/2022 0511  Gross per 24 hour   Intake 470 ml   Output 1050 ml   Net -580 ml       Telemetry: Personally Reviewed    Constitutional: Cooperative and in no apparent distress, and appears well nourished  Skin: Warm and pink; no pallor, cyanosis, clubbing, or bruising   HEENT: Symmetric and normocephalic. PERRL, EOM intact. Conjunctiva pink with clear sclera. Mucus membranes moist.   Cardiovascular: Regular rate and rhythm. S1/S2 present   Respiratory: Respirations symmetric and unlabored. Lungs clear to auscultation bilaterally, no wheezing, crackles, or rhonchi  Gastrointestinal: Abdomen soft and round. Bowel sounds normoactive without tenderness or masses. Musculoskeletal: Bilateral upper and lower extremity strength 5/5 with full ROM  Neurologic/Psych: Awake and orientated to person, place and time.  Calm affect, appropriate mood    Patient Active Problem List    Diagnosis Date Noted    Sepsis ([address] 07/30/2022    Stage 3b chronic kidney disease ([address] 06/26/2022    Cellulitis 06/24/2022    Port-A-Cath in place 08/10/2017    Left BKA 12/09/2014    Diabetes ([address] 10/23/2014    Fever and chills 10/16/2014    Micturition syncope 10/09/2014    Chondroblastic osteosarcoma ([facility] 09/29/2014        Assessment     Admitted with fever   COVID-pneumonia    consult for bradycardia   / asymptomatic   Not on negative chronotropic medications  TTE pending     Hx Chondroblastic osteosarcoma left leg 2014  osteosarcoma. He is status posttreatment as shown above. 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 Remberto Epps APRN-CNP-CVNP    [address]   Interventional cardiology note  Patient seen at his bedside. Had a long discussion about pacemaker and he is agreeable. Heart rate last night down to 28/min but no pauses. It is sinus mechanism. There are no negative inotropic agents at this time. He needs pacemaker. We will discuss further with Dr. Jason Rendon   about proceeding.   Kelle Lucero MD, [facility]

## 2022-08-03 NOTE — CONSULTS
[address]   Cardiac Electrophysiology Consultation   Date: 8/3/2022  Admit Date:  7/30/2022  Reason for Consultation: Severe sinus bradycardia, junctional escape, leading on to hypotension and acute kidney injury  Consult Requesting Physician: Gutierrez Guy MD     Chief Complaint   Patient presents with    Fever     Fall at home, denies hitting head, family concerned for decrease in energy      HPI: Doug Peoples is a 66 y.o. gentleman with a past medical history significant for osteosarcoma of the left leg, status post metastasis to the lung and thyroid, status post below-knee amputation on the left side, CKD stage III, history of tracheoesophageal fistula, hypertension was admitted to the hospital secondary to fall at home. In the ER, he was noted to be febrile, hypotensive and tachycardic. Hence, he was treated for sepsis. He was also noted to be COVID-positive. For the past 3 days or so, he started having severe bradycardia with hypotension, not responsive to fluids. His renal function is worsening in the setting of severe bradycardia. His telemetry from last night shows severe sinus bradycardia, with intermittent junctional and ventricular escape rhythm. His longest sinus pause was about 9 seconds (with in between escape rhythm). Hence EP was consulted for further evaluation management. Patient denies any previous history of syncope. Impression:  Sick sinus syndrome, leading on to hypoperfusion and worsening acute kidney injury  COVID-19 positive  Metastatic osteosarcoma  4. History of port on the right side    Recommendations:  Dual-chamber pacemaker implantation  Avoid AV node blocking agents  Discussed about the indications, risk and benefits  Discussed with the son over the phone  Keep him n.p.o.     Alexander Lynch MD   Cardiac Electrophysiology  [address] [place] [address] [phone]            Past Medical History:   Diagnosis Date    Arthritis     left knee and hands bilateral    Chondroblastic osteosarcoma ([facility] 09/29/2014    Left distal tibia    Diabetes mellitus ([facility]     type 2 1999    Hyperlipidemia     Unspecified cerebral artery occlusion with cerebral infarction 01/01/2008        Past Surgical History:   Procedure Laterality Date    ABDOMEN SURGERY      CHOLECYSTECTOMY      CHOLECYSTECTOMY  1977    Laparoscopic    COLONOSCOPY      COLONOSCOPY      X3. Last one February 2014    ENDOSCOPY, COLON, DIAGNOSTIC      Upper GI prior to Cholecystectomy    FOREIGN BODY REMOVAL      LEG AMPUTATION BELOW KNEE Left 12/08/14    LEG SURGERY Left 8/18/14    biopsy left lower leg    LIPOMA RESECTION  2013    OTHER SURGICAL HISTORY Left 10/22/2014    LEFT SUBCLAVIAN PORT REMOVAL              OTHER SURGICAL HISTORY Right 10/27/14    INSERTION OF SINGLE LUMEN PORT A CATH    TONSILLECTOMY      TONSILLECTOMY      childhood    TUNNELED VENOUS PORT PLACEMENT Right     Power Port       Allergies   Allergen Reactions    Compazine [Prochlorperazine Maleate]      Dizziness       Social History:  Reviewed. reports that he quit smoking about 40 years ago. His smoking use included cigarettes. He has a 12.00 pack-year smoking history. He has never used smokeless tobacco. He reports current alcohol use. He reports that he does not use drugs. Family History:  Reviewed. family history includes Emphysema in his mother; Other in his brother. No premature CAD. Review of System:  All other systems reviewed except for that noted above.  Pertinent negatives and positives are:     Objective      General: negative for fever, chills   Ophthalmic ROS: negative for - eye pain or loss of vision  ENT ROS: negative for - headaches, sore throat   Respiratory: negative for - cough, sputum  Cardiovascular: Reviewed in HPI  Gastrointestinal: negative for - abdominal pain, diarrhea, N/V  Hematology: negative for - bleeding, blood clots, bruising or jaundice  Genito-Urinary:  negative for - Dysuria or incontinence  Musculoskeletal: negative for - Joint swelling, muscle pain  Neurological: negative for - confusion, dizziness, headaches   Psychiatric: No anxiety, no depression. Dermatological: negative for - rash    Physical Examination:  Vitals:    22 1236   BP:    Pulse:    Resp:    Temp:    SpO2: 94%        Intake/Output Summary (Last 24 hours) at 8/3/2022 1317  Last data filed at 8/3/2022 1133  Gross per 24 hour   Intake 350 ml   Output 1200 ml   Net -850 ml     In: 680 [P.O.:660; I.V.:20]  Out: 1600    Wt Readings from Last 3 Encounters:   22 209 lb 14.1 oz (95.2 kg)   22 201 lb 11.5 oz (91.5 kg)   19 194 lb (88 kg)     Temp  Av.9 °F (36.6 °C)  Min: 97.6 °F (36.4 °C)  Max: 98.3 °F (36.8 °C)  Pulse  Av  Min: 32  Max: 38  BP  Min: 90/51  Max: 118/58  SpO2  Av.9 %  Min: 92 %  Max: 94 %    Telemetry: Sinus rhythm, with junctional escape's  Constitutional: Alert. Oriented to person, place, and time. No distress. Head: Normocephalic and atraumatic. Mouth/Throat: Lips appear moist. Oropharynx is clear and moist.  Eyes: Conjunctivae normal. EOM are normal.   Neck: Neck supple. No lymphadenopathy. No rigidity. No JVD present. Cardiovascular: Normal rate, regular rhythm. Normal S1&S2. Carotid pulse 2+ bilaterally. Pulmonary/Chest: Bilateral respiratory sounds present. No respiratory accessory muscle use. No wheezes, No rhonchi. Abdominal: Soft. Normal bowel sounds present. No distension, No tenderness. No splenomegaly. No hernia. Musculoskeletal: No tenderness. No edema    Lymphadenopathy: Has no cervical adenopathy. Neurological: Alert and oriented. Cranial nerve II-XII grossly intact, No gross deficit to touch. Skin: Skin is warm and dry. No rash, lesions, ulcerations noted. Psychiatric: No anxiety nor agitation. Labs:  Reviewed.    Recent Labs     22  0440 22  0455 22  0428    139  --    K 3.9  3.9 4.1  --     105  --    CO2 19* 20*  --    PHOS 3.6 4.3 4.3   BUN 44* 52*  --    CREATININE 3.5* 3.9*  --      Recent Labs     08/01/22  0440 08/02/22  0455 08/03/22  0428   WBC 6.1 5.0 2.4*   HGB 11.3* 10.6* 10.5*   HCT 32.2* 31.1* 30.9*   .6* 101.5* 101.1*    135 121*     Lab Results   Component Value Date/Time    TROPONINI <0.01 07/30/2022 07:27 PM     No results found for: BNP  Lab Results   Component Value Date/Time    PROTIME 15.4 08/01/2022 08:10 PM    PROTIME 13.4 08/07/2019 11:32 AM    PROTIME 10.9 12/01/2014 03:35 AM    INR 1.23 08/01/2022 08:10 PM    INR 1.18 08/07/2019 11:32 AM    INR 1.01 12/01/2014 03:35 AM     No results found for: CHOL, HDL, TRIG    Diagnostic and imaging results reviewed. I independently reviewed the ECG and telemetry.      Scheduled Meds:   insulin lispro  0-8 Units SubCUTAneous TID WC    insulin lispro  0-4 Units SubCUTAneous Nightly    insulin glargine  12 Units SubCUTAneous Daily    cefTRIAXone (ROCEPHIN) IV  1,000 mg IntraVENous Q24H    dexamethasone  6 mg IntraVENous Q24H    pantoprazole  40 mg IntraVENous Daily    saccharomyces boulardii  250 mg Oral BID    baricitinib  1 mg Oral Daily    azithromycin  500 mg IntraVENous Q24H    aspirin  81 mg Oral Daily    atorvastatin  20 mg Oral Daily    sodium chloride flush  5-40 mL IntraVENous 2 times per day    heparin (porcine)  5,000 Units SubCUTAneous 3 times per day    sodium chloride flush  5-40 mL IntraVENous 2 times per day     Continuous Infusions:   sodium chloride 10 mL (08/01/22 2246)    sodium chloride      dextrose       PRN Meds:.diphenhydrAMINE, melatonin, sodium chloride flush, sodium chloride, ondansetron **OR** ondansetron, polyethylene glycol, acetaminophen **OR** acetaminophen, benzonatate, sodium chloride flush, sodium chloride, glucose, dextrose bolus **OR** dextrose bolus, glucagon (rDNA), dextrose     Assessment:   Patient Active Problem List    Diagnosis Date Noted    Sepsis ([address] 07/30/2022    Stage 3b chronic kidney disease ([facility] 06/26/2022    Cellulitis 06/24/2022    Port-A-Cath in place 08/10/2017    Left BKA 12/09/2014    Diabetes ([facility] 10/23/2014    Fever and chills 10/16/2014    Micturition syncope 10/09/2014    Chondroblastic osteosarcoma ([facility] 09/29/2014      Active Hospital Problems    Diagnosis Date Noted    Sepsis [facility]) [A41.9] 07/30/2022     Priority: Medium    Fever and chills [R50.9] 10/16/2014         Thank you for allowing me to participate in the care of [employer] . If you have any questions/comments, please do not hesitate to contact us. Flavia Gramajo MD   Cardiac Electrophysiology  [address]    For any EP related issues after 5 PM, contact [facility] on call cardiology through .

## 2022-08-03 NOTE — PROGRESS NOTES
Patient returned to unit from pacemaker procedure, dressing intact, no complaints of pain at this time.

## 2022-08-03 NOTE — PROGRESS NOTES
[facility] NEPHROLOGY    [facility]              [phone]                      Interval History and Plan:    Patient seen with nurse   Feel fine  HR was in 30- 40 range and BP remained soft in 90- 110/50s range. Urine output is picking up and 1200 ml documented  Cr trending up but lytes stable  No SOB on 2 liter oxygen  Diarrhea is much better now. No nausea or vomiting and can have oral intake. Denied any urinary problem. Does not have Jones       Given  mild edema, will hold on IVF and closely monitor BP and will give IVF boluses as needed for hypotension. Oral hydration. Check bladder scan  Q Shift and straight cath as needed for urinary retention. Monitor heart rate and BP  Follow kidney ultrasound  Trend vitals and I/O and labs closely  No urgent indication for dialysis but patient is close to it but hopefully will be able to avoid it with improving urine output  Monitor closely      D/W patient and nurse at bedside. Pt agree. Assessment :     Acute on chronic kidney disease stage IV:  Baseline creatinine has been around 2.4. Creatinine at the time of admission was 2.9. Patient's chronic kidney disease worse thought secondary to underlying diabetes with progressive disease along with Immunotherapy and previous diuretic use  UA: 0- 2 RBC, 3- 4 WBC  Neg protein on UA, UPCR 0.2  US: ?  Acute decompensation is likely related to relative hypotension      Has a history of AIN from Keytruda    Hypotension. Monitor   Stopped Midodrine due to bradycardia.      Type 2 diabetes mellitus  Metastatic osteosarcoma  Neuropathy      [facility] Nephrology would like to thank Eleonora Smith MD   for opportunity to serve this patient      Please call with questions at-   24 Hrs Answering service [phone] or  7 am- 5 pm via Perfect serve or cell phone  Martha Polanco MD          CC/reason for consult :     MILEY/CKD     HPI :     Pooja Villa is a 66 y.o. male [facility] blood in urine  Neurologic:  vertigo, TIA'S, syncope, seizures, focal weakness  Psychosocial:  insomnia, anxiety, or depression. Additional positive findings:                          All other remaining systems are negative. 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 [address]     type 2     Hyperlipidemia     Unspecified cerebral artery occlusion with cerebral infarction 2008       Past Surgical History:   Procedure Laterality Date    ABDOMEN SURGERY      CHOLECYSTECTOMY      CHOLECYSTECTOMY      Laparoscopic    COLONOSCOPY      COLONOSCOPY      X3.  Last one 2014    ENDOSCOPY, COLON, DIAGNOSTIC      Upper GI prior to Cholecystectomy    FOREIGN BODY REMOVAL      LEG AMPUTATION BELOW KNEE Left 14    LEG SURGERY Left 14    biopsy left lower leg    LIPOMA RESECTION      OTHER SURGICAL HISTORY Left 10/22/2014    LEFT SUBCLAVIAN PORT REMOVAL              OTHER SURGICAL HISTORY Right 10/27/14    INSERTION OF SINGLE LUMEN PORT A CATH    TONSILLECTOMY      TONSILLECTOMY      childhood    TUNNELED VENOUS PORT PLACEMENT Right     Power Port       Social History     Socioeconomic History    Marital status: Single     Spouse name: Not on file    Number of children: Not on file    Years of education: Not on file    Highest education level: Not on file   Occupational History    Not on file   Tobacco Use    Smoking status: Former     Packs/day: 1.00     Years: 12.00     Pack years: 12.00     Types: Cigarettes     Quit date: 1982     Years since quittin.6    Smokeless tobacco: Never    Tobacco comments:     quit    Substance and Sexual Activity    Alcohol use: Yes     Comment: beer daily    Drug use: No    Sexual activity: Not Currently   Other Topics Concern    Not on file   Social History Narrative    ** Merged History Encounter **          Social Determinants of Health     Financial Resource Strain: Not on file   Food Insecurity: Not on file   Transportation Needs: Not on file   Physical Activity: Not on file   Stress: Not on file   Social Connections: Not on file   Intimate Partner Violence: Not on file   Housing Stability: Not on file           Problem Relation Age of Onset    Emphysema Mother     Other Brother           Medication:      cefTRIAXone (ROCEPHIN) IV  1,000 mg IntraVENous Q24H    dexamethasone  6 mg IntraVENous Q24H    pantoprazole  40 mg IntraVENous Daily    saccharomyces boulardii  250 mg Oral BID    baricitinib  1 mg Oral Daily    azithromycin  500 mg IntraVENous Q24H    aspirin  81 mg Oral Daily    atorvastatin  20 mg Oral Daily    sodium chloride flush  5-40 mL IntraVENous 2 times per day    heparin (porcine)  5,000 Units SubCUTAneous 3 times per day    sodium chloride flush  5-40 mL IntraVENous 2 times per day    insulin lispro  0-4 Units SubCUTAneous TID WC    insulin lispro  0-4 Units SubCUTAneous Nightly     diphenhydrAMINE, melatonin, sodium chloride flush, sodium chloride, ondansetron **OR** ondansetron, polyethylene glycol, acetaminophen **OR** acetaminophen, benzonatate, sodium chloride flush, sodium chloride, glucose, dextrose bolus **OR** dextrose bolus, glucagon (rDNA), dextrose       Vitals :     BP (!) 93/56   Pulse (!) 35   Temp 98 °F (36.7 °C) (Oral)   Resp 18   Ht 6' (1.829 m)   Wt 209 lb 14.1 oz (95.2 kg)   SpO2 92%   BMI 28.46 kg/m²     I & O :       Intake/Output Summary (Last 24 hours) at 8/3/2022 0754  Last data filed at 8/3/2022 0511  Gross per 24 hour   Intake 680 ml   Output 1200 ml   Net -520 ml          Physical Examination :     General appearance: Anxious- no, distressed- no, in good spirits- yes  HEENT: Lips- normal, teeth- ok , oral mucosa- moist  Neck : Mass- no, appears symmetrical, JVD- not visible  Respiratory: Respiratory effort- , wheeze- no, crackles - no  Cardiovascular:  Ausculation- S1S2, Edema mild  Abdomen: visible mass- no, distention- no, scar- no, tenderness- no                            hepatosplenomegaly-  no  Musculoskeletal:  clubbing no,cyanosis- no , digital ischemia- no                           muscle strength- grossly normal , tone - grossly normal  Right BKA   Skin: rashes- no , ulcers- no, induration- no, tightening - no  Psychiatric:  mood stable effect normal   CNS: AAO x 3      LABS:     Recent Labs     08/01/22 0440 08/02/22 0455 08/03/22 0428   WBC 6.1 5.0 2.4*   HGB 11.3* 10.6* 10.5*   HCT 32.2* 31.1* 30.9*    135 121*       Recent Labs     08/01/22 0440 08/02/22 0455 08/03/22  0428    139 137   K 3.9 4.1 4.3    105 100   CO2 19* 20* 25   BUN 44* 52* 60*   CREATININE 3.5* 3.9* 4.2*   GLUCOSE 139* 146* 272*   PHOS 3.6 4.3 4.3

## 2022-08-03 NOTE — PROGRESS NOTES
air.  Work-up in the ED revealing a slight rise in baseline creatinine. Normal leukocyte count as well as normal hemoglobin levels. UA unremarkable for a UTI. VBG normal.  Chest x-ray did not show any acute abnormality. Patient received sepsis bolus as well as Vancomycin and cefepime in the ED. Patient admitted for concern of an infectious etiology behind the weakness and fever. Blood cultures sent, covid swab done prior to admission. Patient admitted to [facility] for further management.     Medications:     Scheduled Meds:   insulin lispro  0-8 Units SubCUTAneous TID WC    insulin lispro  0-4 Units SubCUTAneous Nightly    insulin glargine  12 Units SubCUTAneous Daily    cefTRIAXone (ROCEPHIN) IV  1,000 mg IntraVENous Q24H    dexamethasone  6 mg IntraVENous Q24H    pantoprazole  40 mg IntraVENous Daily    saccharomyces boulardii  250 mg Oral BID    baricitinib  1 mg Oral Daily    azithromycin  500 mg IntraVENous Q24H    aspirin  81 mg Oral Daily    atorvastatin  20 mg Oral Daily    sodium chloride flush  5-40 mL IntraVENous 2 times per day    heparin (porcine)  5,000 Units SubCUTAneous 3 times per day    sodium chloride flush  5-40 mL IntraVENous 2 times per day     Continuous Infusions:   sodium chloride 10 mL (08/01/22 2246)    sodium chloride      dextrose       PRN Meds:diphenhydrAMINE, melatonin, sodium chloride flush, sodium chloride, ondansetron **OR** ondansetron, polyethylene glycol, acetaminophen **OR** acetaminophen, benzonatate, sodium chloride flush, sodium chloride, glucose, dextrose bolus **OR** dextrose bolus, glucagon (rDNA), dextrose    Objective:   Vitals:   T-max:  Patient Vitals for the past 8 hrs:   BP Temp Temp src Pulse Resp SpO2 Weight   08/03/22 0952 (!) 118/58 97.8 °F (36.6 °C) Oral (!) 38 18 93 % --   08/03/22 0546 -- -- -- -- -- -- 209 lb 14.1 oz (95.2 kg)   08/03/22 0511 (!) 93/56 98 °F (36.7 °C) Oral (!) 35 18 92 % --       Intake/Output Summary (Last 24 hours) at 8/3/2022 1042  Last data filed at 8/3/2022 0511  Gross per 24 hour   Intake 470 ml   Output 1050 ml   Net -580 ml       Review of Systems   Constitutional: Negative. Respiratory:  Positive for cough. Negative for chest tightness, shortness of breath and wheezing. Cardiovascular:  Negative for chest pain. Gastrointestinal:  Positive for diarrhea. Negative for abdominal pain. Psychiatric/Behavioral: Negative. Physical Exam  Constitutional:       Appearance: Normal appearance. Eyes:      Extraocular Movements: Extraocular movements intact. Pupils: Pupils are equal, round, and reactive to light. Cardiovascular:      Rate and Rhythm: Regular rhythm. Bradycardia present. Pulses: Normal pulses. Heart sounds: Normal heart sounds. No murmur heard. No gallop. Pulmonary:      Effort: Pulmonary effort is normal.      Breath sounds: Normal breath sounds. Abdominal:      General: Abdomen is flat. There is no distension. Palpations: Abdomen is soft. Tenderness: There is no abdominal tenderness. Musculoskeletal:      Comments: L BKA   Neurological:      General: No focal deficit present. Mental Status: He is alert. LABS:    CBC:   Recent Labs     08/01/22 0440 08/02/22 0455 08/03/22 0428   WBC 6.1 5.0 2.4*   HGB 11.3* 10.6* 10.5*   HCT 32.2* 31.1* 30.9*    135 121*   .6* 101.5* 101.1*     Renal:    Recent Labs     08/01/22 0440 08/02/22 0455 08/03/22 0428    139 137   K 3.9  3.9 4.1 4.3    105 100   CO2 19* 20* 25   BUN 44* 52* 60*   CREATININE 3.5* 3.9* 4.2*   GLUCOSE 139* 146* 272*   CALCIUM 8.0* 8.0* 8.0*   PHOS 3.6 4.3 4.3   ANIONGAP 14 14 12     Hepatic:   Recent Labs     08/01/22 0440 08/02/22 0455 08/03/22 0428   AST 59* 63* 43*   ALT 30 32 27   BILITOT 0.6 0.6 0.4   PROT 5.4* 5.1* 5.0*   LABALBU 3.2* 3.3* 3.3*   ALKPHOS 54 51 51     Troponin: No results for input(s): TROPONINI in the last 72 hours.   BNP: No results for input(s): BNP in the last 72 hours. Lipids: No results for input(s): CHOL, HDL in the last 72 hours. Invalid input(s): LDLCALCU, TRIGLYCERIDE  ABGs:  No results for input(s): PHART, ISF2EJC, PO2ART, OCH8YOI, BEART, THGBART, R0PDAOVL, RHR4RZX in the last 72 hours. INR:   Recent Labs     08/01/22 2010   INR 1.23*     Lactate: No results for input(s): LACTATE in the last 72 hours. Cultures:  -----------------------------------------------------------------  RAD:   VL Extremity Venous Right   Final Result      CT CHEST WO CONTRAST   Final Result      Bilateral patchy parenchymal infiltrates are seen throughout both lungs, left greater than right. The appearance is suggestive of atypical pneumonia. Recommend follow-up imaging to verify resolution and exclude underlying nodularity. Coronary calcification. Thyromegaly with calcifications noted in the lower neck. XR CHEST PORTABLE   Final Result   1. No acute disease. 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 Mendoza [facility], type 2 diabetes mellitus, hyperlipidemia, CKD stage III, HLD who presented to the ED with complaints of a fever. Sepsis 2/2 suspected COVID PNA vs cellulitis  Patient met SIRS criterion on arrival. Febrile and hypotensive, lactic acid normal.  Patient with symptoms of cough, weakness, body aches, concerning for a suspected respiratory infection.  Given negative CXR findings, appears the COVID infection is confined to URT.  - S/p sepsis bolus in ED  - Bcx no growth to date; procalcitonin mildly elevated at 0.3  - supplemental O2 as needed, currently saturating well on 2L NC  - NaHCO3 d/c,  IVF boluses PRN to correct hypotension  - Rocephin and azithromycin  - Decadron     CKD IV - Up to 4.2 from 3.9 yesterday  History of AIN from Keytruda. Patient follows up with [facility] nephrology outpatient. - monitor Cr, UOP; UOP beginning to increase  - avoid nephrotoxin agents  - Bladder scan: monitoring urinary retention    Hyperglycemia  - Glucose: 272 (8/3)  - Increased lantus to 12  - Switched to MDSS    Bradycardia  Pt has persistent bradycardia but asymptomatic since arrival and has dipper to below 30 overnight (8/2); Cardiology is following closely   - Echo pending  - Instructions to pace if pt becomes symptomatic     Chronic Medical Conditions:     3. T2DM: Patient on 19 units of Lantus nightly. Starting patient on low-dose siding scale insulin. POCT before meals and at bedtime. 4. Hyperlipidemia: On Lipitor. 5. Neuropathic pain: Continued home Lyrica 150 mg BID. 6. TE fistula s/p treatment of metastatic osteosarcoma. No aspiration events reported. 7.  Metastatic Osteosarcoma:  Patient follows up with Dr. Riana Wills from [facility]. Previous treatment:  Adriamycin/cisplatin with intermitted high dose methotrexate followed by amputation. Max/CDDP Q21 days x 4 post -op 2/3/15 - 4/7/2015  Current therapy:  Gemcitabine D1,8 + Docetaxel D8 Q21D. Total cycle 21, on cycle 8.  C1D1 04/17/2019     Code Status: Full  FEN: thick liquids  PPX: subq heparin  DISPO: IVAN Mcgill DO, PGY-1  08/03/22  10:42 AM    This patient has been staffed and discussed with Brenden Crowder MD.

## 2022-08-03 NOTE — PROCEDURES
[facility]     Electrophysiology Procedure Note       Date of Procedure: 8/3/2022  Patient's Name: Jase Go  YOB: 1950   Medical Record Number: [id number]  Procedure Performed by: Alysa Henry MD    Procedures performed:  Insertion of MRI compatible right ventricular pacing lead under fluoroscopy. Insertion of MRI compatible right atrial lead under fluoroscopy  Insertion of a MRI compatible dual chamber Pacemaker. Electronic analysis of lead and device. Anesthesia: Local and Monitored Anesthesia Care  An independent trained observer pushed medications at my direction. We monitored the patient's level of consciousness and vital signs/physiologic status throughout the procedure duration (see start and stop times below). Level of sedation plan: Moderate sedation (conscious sedation) with intravenous Midazolam 3 mg and Fentanyl 125 mcg   Start time: 1620  Stop time: 1710  Mallampati airway assessment class: I  ASA class: 1  Estimated blood loss less than 20 cc    Indication of the procedure: Jase Go is a 66 y.o. male with symptomatic bradycardia. He was admitted to the hospital with ongoing sepsis and positive for COVID. He was noted to have severe sinus bradycardia with a heart rate of 25 to 30 bpm.  Yesterday, patient had long sinus pauses up to 9 seconds with intermittent ventricular and junctional escape rhythm. Secondary to persistent bradycardia, he was hypotensive and have worsening renal functions. Secondary to symptomatic sick sinus syndrome, he was recommended to have a dual-chamber pacemaker implantation. Details of procedure: The patient was brought to the electrophysiology laboratory in stable condition. The patient was in a fasting and non-sedated state. The risks, benefits and alternatives of the procedure were discussed with the patient.  The risks including, but not limited to, the risks of vascular injury, bleeding, infection, device malfunction, lead dislodgement, radiation exposure, injury to cardiac and surrounding structures (including pneumothorax), stroke, myocardial infarction and death were discussed in detail. The patient opted to proceed with the device implantation. Written informed consent was signed and placed in the chart. Prophylactic antibiotic using Ancef 2 g IV was given. The patient was prepped and draped in sterile fashion. A timeout protocol was completed to identify the patient and the procedure being performed. IV sedation was provided with IV Versed and IV Fentanyl. The patient was monitored continuously with ECG, pulse oximetry, blood pressure monitoring, and direct observation. An incision was made in the left upper pectoral area in a transverse line roughly 1 cm from the clavicle after administration of lidocaine/bupivicaine combination. Using electrocautery and blunt dissection, a pocket was created. Central venous access into the left extrathoracic subclavian vein was obtained using the modified Seldinger technique. After central venous access was obtained, a sheath was placed in the axillary vein. A right ventricular lead was advanced into the RV septal position under fluoroscopic guidance and using a series of curved and straight stylets. The lead was actively fixated. After confirming appropriate function and no diaphragmatic stimulation at maximum output, the sheath was split and removed. The lead was secured to the underlying tissue using suture material.      A new sheath was advanced over a second previously placed wire into the vein. The atrial lead was advanced to the right atrial appendage and actively fixated under fluoroscopic guidance. After confirming appropriate function and no diaphragmatic stimulation at maximum output, the sheath was split and removed. The lead was secured to the underlying tissue using suture.      The leads were then connected to the new pulse generator which was then placed into the pocket. Antibiotic solution along with saline flush was used to irrigate the pocket. The pocket was then closed using a 2-0 and 3-0 Vicryl subcutaneous layer and a subcuticular layer using 4-0 Vicryl. The skin was covered with Steri-Strips and dressing. All sponge and needle counts were reported as correct at the end of the procedure. The patient tolerated the procedure well and there were no complications. Patient was transported to the holding area in stable condition. Device and Leads information:        Device was programmed to MVP with lower rate of 60 and upper tracking rate of 130. Impression:    Pre- and post-procedural diagnoses of sick sinus syndrome with successful implantation of a [employer]onic 2-chamber PPM.     Plan:     The patient will be transferred to the floor and have usual post-implant care, including chest x-ray, and interrogation of the device. From an EP perspective, if the interrogation and CXR tomorrow AM are showing appropriate functioning, parameters and placement, the patient may be discharged from the hospital.     Follow-up will be a 1-week wound check with our nurse in the [facility] and a 1-month follow-up with EP NP. Thank you for allowing us to participate in the care of your patient. If you have any questions, please do not hesitate to contact me.     Jennifer Russell MD   Cardiac Electrophysiology  [facility]

## 2022-08-03 NOTE — PROGRESS NOTES
Physical Therapy  HOLD    Attempted to see pt this PM for PT. RN reports pt having pacemaker placed due to bradycardia. Will hold PT today. Continue per plan of care as appropriate.      Halie Mcallister #[id number]

## 2022-08-03 NOTE — PROGRESS NOTES
Communication received from on-call cardiology Wu Gamboa to call back if pt becomes symptomatic with bradycardia for parameters for pacing.  Electronically signed by Thompson Tejeda RN on 8/3/2022 at 2:24 AM

## 2022-08-03 NOTE — PROGRESS NOTES
Today AM renal panel which was reported in my AM note is not in Epic now.    Notified José Manuel Sanchez () and working on it    Pablo Riley MD

## 2022-08-03 NOTE — DISCHARGE INSTRUCTIONS
[address] office at:  [phone]  Donovan Talavera RN    Permanent Pacemaker (PPM)   Implantable Cardioverter Defibrillator (ICD)  Care Instructions      Your permanent pacemaker/ICD is a sophisticated piece of electronic equipment and both the wound and the device need special care during your recovery period and into the future. Please use these instructions as guide. If you have any questions please call the office. Wound Care:   Keep the incision site clean and dry for one week. Do not get the incision or bandage wet. You may sponge bathe but DO NOT shower for the first seven days or until after your first follow up office visit. Do not remove the outer dressing   DO NOT apply soaps, ointments,  lotion or powder to the incision site. Wear comfortable clothes that will not rub on the incision site. Avoid bra straps or suspenders. At your one week follow up appointment your bandage will be removed and you will be given further instruction on care of the site at that time. Your bra strap may lay directly over the incision. If it does - please do not wear the bra strap on the incisional side for 4 weeks to prevent irritation. When to contact the office:  Changes in how your incision site is healing including:  Increase in swelling and/or tenderness   Increase in redness at the incision site or surrounding the device  Drainage from the incision  If you experience:  Lightheadedness, dizziness or passing out  Increase in fatigue or shortness of breath  Fever (temperature greater than 100 degrees F)  Chills   Prolonged hiccups or chest discomfort  If you have any questions     Activity:   Do not raise your elbow above shoulder level or reach behind your back for 4 weeks after your procedure.   DO NOT lift more than 8 pounds with your affected arm for 4 weeks after your procedure. (A gallon of milk is 8 pounds). Avoid excessive pushing, pulling or twisting. DO NOT drive until instructed it is OK by your provider. Wear a sling only as a reminder to limit the activity of your affected arm. It is important to remember to still use your affected arm to maintain mobility but no excessive movements. No sports activities until approved by your provider. Precautions: You may use common household appliances, if they are in good repair (even microwaves). You should not lean against them while they are on. When using cellular and cordless phones, keep them at least 6 inches away from your device. (Use on the opposite side of your device.)  Do not hold or carry strong magnets. You may NOT perform welding. Airport and security screening devices should be avoided. You will need to show your ID card and ask for a hand search. Always tell your health care professional (including the dentist) that you have a device and show your ID card. ID Card: You will be provided a temporary ID card at the time of your hospital discharge and a permanent ID card in approximately 8 weeks. It is very important that you carry this ID card with you at ALL times. Consider purchasing a medical alert bracelet identifying your device and . Please refer to your pacemaker booklet for more information about your device. Follow Up Care: The pacemaker/ICD DOES NOT replace your current medications. It is important for you to continue your medications as prescribed. You will be given your first follow up appointment approximately one week after your procedure to check your wound and device. You will then have a follow up approximately one month after that appointment and then every 3-6 months thereafter. If you are unsure of your follow up appointment [address] FOLLOW-UP APPOINTMENTS    Follow-up with device technician,  in 7-10 days for a wound and device check.   Follow-up with NP at 1 month for a wound and device check. Follow-up with NP at 3 months for an appointment and device check. Maxi Singleton, [address] Phone: [phone]. If you are unable to make this appointment, please call to reschedule.

## 2022-08-04 ENCOUNTER — NURSE ONLY (OUTPATIENT)
Dept: CARDIOLOGY CLINIC | Age: 72
End: 2022-08-04
Payer: OTHER GOVERNMENT

## 2022-08-04 DIAGNOSIS — I49.5 SSS (SICK SINUS SYNDROME) (HCC): ICD-10-CM

## 2022-08-04 DIAGNOSIS — Z95.0 PACEMAKER: Primary | ICD-10-CM

## 2022-08-04 PROBLEM — U07.1 COVID-19: Status: ACTIVE | Noted: 2022-08-04

## 2022-08-04 LAB
A/G RATIO: 1.8 (ref 1.1–2.2)
A/G RATIO: 1.8 (ref 1.1–2.2)
ALBUMIN SERPL-MCNC: 3.3 G/DL (ref 3.4–5)
ALBUMIN SERPL-MCNC: 3.3 G/DL (ref 3.4–5)
ALP BLD-CCNC: 50 U/L (ref 40–129)
ALP BLD-CCNC: 51 U/L (ref 40–129)
ALT SERPL-CCNC: 27 U/L (ref 10–40)
ALT SERPL-CCNC: 32 U/L (ref 10–40)
ANION GAP SERPL CALCULATED.3IONS-SCNC: 14 MMOL/L (ref 3–16)
ANION GAP SERPL CALCULATED.3IONS-SCNC: 15 MMOL/L (ref 3–16)
AST SERPL-CCNC: 36 U/L (ref 15–37)
AST SERPL-CCNC: 63 U/L (ref 15–37)
BASOPHILS ABSOLUTE: 0 K/UL (ref 0–0.2)
BASOPHILS RELATIVE PERCENT: 0.1 %
BILIRUB SERPL-MCNC: 0.4 MG/DL (ref 0–1)
BILIRUB SERPL-MCNC: 0.6 MG/DL (ref 0–1)
BLOOD CULTURE, ROUTINE: NORMAL
BLOOD CULTURE, ROUTINE: NORMAL
BUN BLDV-MCNC: 52 MG/DL (ref 7–20)
BUN BLDV-MCNC: 68 MG/DL (ref 7–20)
CALCIUM SERPL-MCNC: 8 MG/DL (ref 8.3–10.6)
CALCIUM SERPL-MCNC: 8.3 MG/DL (ref 8.3–10.6)
CHLORIDE BLD-SCNC: 104 MMOL/L (ref 99–110)
CHLORIDE BLD-SCNC: 105 MMOL/L (ref 99–110)
CHOLESTEROL, TOTAL: 88 MG/DL (ref 0–199)
CO2: 20 MMOL/L (ref 21–32)
CO2: 23 MMOL/L (ref 21–32)
CREAT SERPL-MCNC: 3.9 MG/DL (ref 0.8–1.3)
CREAT SERPL-MCNC: 4 MG/DL (ref 0.8–1.3)
CULTURE, BLOOD 2: NORMAL
EOSINOPHILS ABSOLUTE: 0 K/UL (ref 0–0.6)
EOSINOPHILS RELATIVE PERCENT: 0 %
GFR AFRICAN AMERICAN: 18
GFR AFRICAN AMERICAN: 18
GFR NON-AFRICAN AMERICAN: 15
GFR NON-AFRICAN AMERICAN: 15
GLUCOSE BLD-MCNC: 146 MG/DL (ref 70–99)
GLUCOSE BLD-MCNC: 160 MG/DL (ref 70–99)
GLUCOSE BLD-MCNC: 245 MG/DL (ref 70–99)
GLUCOSE BLD-MCNC: 272 MG/DL (ref 70–99)
GLUCOSE BLD-MCNC: 275 MG/DL (ref 70–99)
HCT VFR BLD CALC: 30 % (ref 40.5–52.5)
HDLC SERPL-MCNC: 30 MG/DL (ref 40–60)
HEMOGLOBIN: 10.3 G/DL (ref 13.5–17.5)
LDL CHOLESTEROL CALCULATED: 29 MG/DL
LYMPHOCYTES ABSOLUTE: 0.3 K/UL (ref 1–5.1)
LYMPHOCYTES RELATIVE PERCENT: 8.5 %
MCH RBC QN AUTO: 34.2 PG (ref 26–34)
MCHC RBC AUTO-ENTMCNC: 34.2 G/DL (ref 31–36)
MCV RBC AUTO: 99.9 FL (ref 80–100)
MONOCYTES ABSOLUTE: 0.3 K/UL (ref 0–1.3)
MONOCYTES RELATIVE PERCENT: 9.3 %
NEUTROPHILS ABSOLUTE: 2.8 K/UL (ref 1.7–7.7)
NEUTROPHILS RELATIVE PERCENT: 82.1 %
PDW BLD-RTO: 13 % (ref 12.4–15.4)
PERFORMED ON: ABNORMAL
PHOSPHORUS: 4.2 MG/DL (ref 2.5–4.9)
PLATELET # BLD: 121 K/UL (ref 135–450)
PMV BLD AUTO: 8.8 FL (ref 5–10.5)
POTASSIUM REFLEX MAGNESIUM: 4.1 MMOL/L (ref 3.5–5.1)
POTASSIUM SERPL-SCNC: 4.4 MMOL/L (ref 3.5–5.1)
RBC # BLD: 3.01 M/UL (ref 4.2–5.9)
SODIUM BLD-SCNC: 139 MMOL/L (ref 136–145)
SODIUM BLD-SCNC: 142 MMOL/L (ref 136–145)
TOTAL PROTEIN: 5.1 G/DL (ref 6.4–8.2)
TOTAL PROTEIN: 5.1 G/DL (ref 6.4–8.2)
TRIGL SERPL-MCNC: 147 MG/DL (ref 0–150)
VLDLC SERPL CALC-MCNC: 29 MG/DL
WBC # BLD: 3.4 K/UL (ref 4–11)

## 2022-08-04 PROCEDURE — 6370000000 HC RX 637 (ALT 250 FOR IP): Performed by: STUDENT IN AN ORGANIZED HEALTH CARE EDUCATION/TRAINING PROGRAM

## 2022-08-04 PROCEDURE — 84100 ASSAY OF PHOSPHORUS: CPT

## 2022-08-04 PROCEDURE — 6360000002 HC RX W HCPCS: Performed by: INTERNAL MEDICINE

## 2022-08-04 PROCEDURE — 2060000000 HC ICU INTERMEDIATE R&B

## 2022-08-04 PROCEDURE — 99233 SBSQ HOSP IP/OBS HIGH 50: CPT | Performed by: NURSE PRACTITIONER

## 2022-08-04 PROCEDURE — 85025 COMPLETE CBC W/AUTO DIFF WBC: CPT

## 2022-08-04 PROCEDURE — 6360000002 HC RX W HCPCS

## 2022-08-04 PROCEDURE — 6370000000 HC RX 637 (ALT 250 FOR IP)

## 2022-08-04 PROCEDURE — 36415 COLL VENOUS BLD VENIPUNCTURE: CPT

## 2022-08-04 PROCEDURE — 2580000003 HC RX 258: Performed by: INTERNAL MEDICINE

## 2022-08-04 PROCEDURE — C9113 INJ PANTOPRAZOLE SODIUM, VIA: HCPCS | Performed by: INTERNAL MEDICINE

## 2022-08-04 PROCEDURE — 93280 PM DEVICE PROGR EVAL DUAL: CPT | Performed by: INTERNAL MEDICINE

## 2022-08-04 PROCEDURE — 6370000000 HC RX 637 (ALT 250 FOR IP): Performed by: INTERNAL MEDICINE

## 2022-08-04 PROCEDURE — 99233 SBSQ HOSP IP/OBS HIGH 50: CPT | Performed by: INTERNAL MEDICINE

## 2022-08-04 PROCEDURE — 80053 COMPREHEN METABOLIC PANEL: CPT

## 2022-08-04 RX ORDER — INSULIN LISPRO 100 [IU]/ML
0-16 INJECTION, SOLUTION INTRAVENOUS; SUBCUTANEOUS
Status: DISCONTINUED | OUTPATIENT
Start: 2022-08-04 | End: 2022-08-08 | Stop reason: HOSPADM

## 2022-08-04 RX ORDER — PANTOPRAZOLE SODIUM 40 MG/1
40 TABLET, DELAYED RELEASE ORAL
Status: DISCONTINUED | OUTPATIENT
Start: 2022-08-05 | End: 2022-08-08 | Stop reason: HOSPADM

## 2022-08-04 RX ORDER — AZITHROMYCIN 250 MG/1
500 TABLET, FILM COATED ORAL DAILY
Status: COMPLETED | OUTPATIENT
Start: 2022-08-04 | End: 2022-08-05

## 2022-08-04 RX ORDER — DEXAMETHASONE 4 MG/1
6 TABLET ORAL DAILY
Status: DISCONTINUED | OUTPATIENT
Start: 2022-08-04 | End: 2022-08-08 | Stop reason: HOSPADM

## 2022-08-04 RX ORDER — INSULIN LISPRO 100 [IU]/ML
0-4 INJECTION, SOLUTION INTRAVENOUS; SUBCUTANEOUS NIGHTLY
Status: DISCONTINUED | OUTPATIENT
Start: 2022-08-04 | End: 2022-08-08 | Stop reason: HOSPADM

## 2022-08-04 RX ADMIN — Medication 250 MG: at 20:48

## 2022-08-04 RX ADMIN — SODIUM CHLORIDE, PRESERVATIVE FREE 10 ML: 5 INJECTION INTRAVENOUS at 20:48

## 2022-08-04 RX ADMIN — INSULIN GLARGINE 12 UNITS: 100 INJECTION, SOLUTION SUBCUTANEOUS at 09:33

## 2022-08-04 RX ADMIN — HEPARIN SODIUM 5000 UNITS: 5000 INJECTION INTRAVENOUS; SUBCUTANEOUS at 06:09

## 2022-08-04 RX ADMIN — PANTOPRAZOLE SODIUM 40 MG: 40 INJECTION, POWDER, FOR SOLUTION INTRAVENOUS at 09:30

## 2022-08-04 RX ADMIN — CEFTRIAXONE 1000 MG: 1 INJECTION, POWDER, FOR SOLUTION INTRAMUSCULAR; INTRAVENOUS at 09:38

## 2022-08-04 RX ADMIN — Medication 10 MG: at 20:48

## 2022-08-04 RX ADMIN — DEXAMETHASONE 6 MG: 4 TABLET ORAL at 13:27

## 2022-08-04 RX ADMIN — INSULIN LISPRO 4 UNITS: 100 INJECTION, SOLUTION INTRAVENOUS; SUBCUTANEOUS at 09:32

## 2022-08-04 RX ADMIN — ASPIRIN 81 MG: 81 TABLET, COATED ORAL at 09:31

## 2022-08-04 RX ADMIN — INSULIN LISPRO 4 UNITS: 100 INJECTION, SOLUTION INTRAVENOUS; SUBCUTANEOUS at 13:28

## 2022-08-04 RX ADMIN — BARICITINIB 1 MG: 2 TABLET, FILM COATED ORAL at 09:31

## 2022-08-04 RX ADMIN — SODIUM CHLORIDE, PRESERVATIVE FREE 10 ML: 5 INJECTION INTRAVENOUS at 09:32

## 2022-08-04 RX ADMIN — Medication 250 MG: at 09:31

## 2022-08-04 RX ADMIN — ATORVASTATIN CALCIUM 20 MG: 20 TABLET, FILM COATED ORAL at 09:31

## 2022-08-04 RX ADMIN — HEPARIN SODIUM 5000 UNITS: 5000 INJECTION INTRAVENOUS; SUBCUTANEOUS at 13:26

## 2022-08-04 RX ADMIN — AZITHROMYCIN MONOHYDRATE 500 MG: 250 TABLET ORAL at 13:27

## 2022-08-04 ASSESSMENT — ENCOUNTER SYMPTOMS
DIARRHEA: 0
WHEEZING: 0
ABDOMINAL PAIN: 0
COUGH: 1
CHEST TIGHTNESS: 0
CONSTIPATION: 0

## 2022-08-04 ASSESSMENT — PAIN SCALES - GENERAL
PAINLEVEL_OUTOF10: 0

## 2022-08-04 NOTE — CARE COORDINATION
Case Management Assessment           Daily Note                 Date/ Time of Note: 8/4/2022 4:09 PM         Note completed by: Nasra Avery RN    Patient Name: Jen Gloria  YOB: 1950    Diagnosis:Fever and chills [R50.9]  Septicemia ([address] [A41.9]  Sepsis ([facility] [address] [A41.9]  Patient Admission Status: Inpatient    Date of Admission:7/30/2022  6:27 PM Length of Stay: 5 GLOS: GMLOS: 4.8    Current Plan of Care: IV abx   ________________________________________________________________________________________  PT AM-PAC: 10 / 24 per last evaluation on: 8/2    OT AM-PAC: 14 / 24 per last evaluation on: 8/2    DME Needs for discharge: n/a  ________________________________________________________________________________________  Discharge Plan: Home with Home Health Care: [facility]    Tentative discharge date: 8/5    Current barriers to discharge: medical clearance    ________________________________________________________________________  Case Management Notes:  CM spoke with patient over the phone. He does not want to go to inpatient rehab at discharge. He wishes to return home and continue with the [facility] in home rehab. Patient states he has help from his son and girlfriend if needed. Patient states his prosthetic leg was to be delivered today, stump has not completely healed yet, but looks forward to getting therapy at home. [facility] will resume services at discharge. Family to transport home. Sunil Bass and his family were provided with choice of provider; he and his family are in agreement with the discharge plan.     Care Transition Patient: Lian Avery RN  [facility]  Case Management Department  Ph: [phone]  Fax: [phone]

## 2022-08-04 NOTE — PROGRESS NOTES
Progress Note    Admit Date: 7/30/2022  Day: 6  Diet: ADULT DIET; Regular  Diet NPO    CC: Fever, Fatigue    Interval history:   - Pt was seen and examined at bedside; he is afebrile and satting comfortably on room air  - Pt received a pacemaker yesterday and now has HR in the 55-60's which is up trending from before the procedure  - Echo: showed normal findings and a LV EF of 45-50%  - Pt does not complain of SOB, chest pain, or an increase in the cough that he already has; pt states that tesslon pearls helps greatly  - Pt BUN and Cr continue to elevate; continuing to monitor and see nephrology recommendations  - Waiting on PT/OT for scores    HPI:   The patient is a 38-year-old male with past medical history of 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 [facility], type 2 diabetes mellitus, hyperlipidemia, CKD stage III, HLD who presented to the ED with complaints of a fever. He reports that he checks his temperature daily and decided to come to the ED when he had a temperature of 100.2 today. He also reports feeling weak and had a fall today while shifting himself from the chair to the bed. Review of systems positive for body aches and fever. He denies chills, chest pain, shortness of, abdominal pain, changes in bowel habits, dysuria. Physical exam revealed rhonchorous lungs. Abdomen distended, chronic hernia, nontender. BKA of the left leg. Strikingly, the skin below the right knee extending towards the ankle was markedly warm without any erythema. Patient denies any recent swelling or pain in the leg. Multiple excoriations seen on the skin in the LE. On arrival to the ED, febrile to 100.6, BP 93/53, reportedly tachycardic, was on room air. Work-up in the ED revealing a slight rise in baseline creatinine. Normal leukocyte count as well as normal hemoglobin levels. UA unremarkable for a UTI.   VBG normal. Chest x-ray did not show any acute abnormality. Patient received sepsis bolus as well as Vancomycin and cefepime in the ED. Patient admitted for concern of an infectious etiology behind the weakness and fever. Blood cultures sent, covid swab done prior to admission. Patient admitted to [facility] for further management.        Medications:     Scheduled Meds:   insulin lispro  0-8 Units SubCUTAneous TID WC    insulin lispro  0-4 Units SubCUTAneous Nightly    insulin glargine  12 Units SubCUTAneous Daily    sodium chloride flush  5-40 mL IntraVENous 2 times per day    cefTRIAXone (ROCEPHIN) IV  1,000 mg IntraVENous Q24H    dexamethasone  6 mg IntraVENous Q24H    pantoprazole  40 mg IntraVENous Daily    saccharomyces boulardii  250 mg Oral BID    baricitinib  1 mg Oral Daily    azithromycin  500 mg IntraVENous Q24H    aspirin  81 mg Oral Daily    atorvastatin  20 mg Oral Daily    sodium chloride flush  5-40 mL IntraVENous 2 times per day    heparin (porcine)  5,000 Units SubCUTAneous 3 times per day    sodium chloride flush  5-40 mL IntraVENous 2 times per day     Continuous Infusions:   sodium chloride      sodium chloride 10 mL (08/01/22 2246)    sodium chloride      dextrose       PRN Meds:sodium chloride flush, sodium chloride, diphenhydrAMINE, melatonin, sodium chloride flush, sodium chloride, ondansetron **OR** ondansetron, polyethylene glycol, acetaminophen **OR** acetaminophen, benzonatate, sodium chloride flush, sodium chloride, glucose, dextrose bolus **OR** dextrose bolus, glucagon (rDNA), dextrose    Objective:   Vitals:   T-max:  Patient Vitals for the past 8 hrs:   BP Temp Temp src Pulse Resp SpO2 Weight   08/04/22 0922 125/74 97.9 °F (36.6 °C) Oral 60 18 93 % --   08/04/22 0607 -- -- -- -- -- -- 214 lb 4.6 oz (97.2 kg)   08/04/22 0400 95/60 98 °F (36.7 °C) Oral 54 18 93 % --       Intake/Output Summary (Last 24 hours) at 8/4/2022 0945  Last data filed at 8/4/2022 0400  Gross per 24 hour   Intake 240 ml Output 1150 ml   Net -910 ml       Review of Systems   Constitutional: Negative. Respiratory:  Positive for cough. Negative for chest tightness and wheezing. Cardiovascular:  Negative for chest pain and palpitations. Gastrointestinal:  Negative for abdominal pain, constipation and diarrhea. Psychiatric/Behavioral: Negative. Physical Exam  Constitutional:       Appearance: Normal appearance. Eyes:      Extraocular Movements: Extraocular movements intact. Pupils: Pupils are equal, round, and reactive to light. Cardiovascular:      Rate and Rhythm: Regular rhythm. Bradycardia present. Pulses: Normal pulses. Heart sounds: Normal heart sounds. No murmur heard. No gallop. Pulmonary:      Effort: Pulmonary effort is normal.      Breath sounds: Normal breath sounds. Abdominal:      General: Abdomen is flat. Palpations: Abdomen is soft. Neurological:      General: No focal deficit present. Mental Status: He is alert. Psychiatric:         Mood and Affect: Mood normal.       LABS:    CBC:   Recent Labs     08/02/22 0455 08/03/22 0428 08/04/22  0514   WBC 5.0 2.4* 3.4*   HGB 10.6* 10.5* 10.3*   HCT 31.1* 30.9* 30.0*    121* 121*   .5* 101.1* 99.9     Renal:    Recent Labs     08/02/22 0455 08/03/22 0428 08/03/22  0429 08/03/22  1314 08/04/22  0514    137  --   --  142   K 4.1 4.3  --   --  4.4    100  --   --  104   CO2 20* 25  --   --  23   BUN 52* 60*  --   --  68*   CREATININE 3.9* 4.2*  --   --  4.0*   GLUCOSE 146* 272*  --   --  275*   CALCIUM 8.0* 8.0*  --   --  8.3   MG  --   --  1.80 2.00  --    PHOS 4.3 4.3  --   --  4.2   ANIONGAP 14 12  --   --  15     Hepatic:   Recent Labs     08/02/22 0455 08/03/22 0428 08/04/22  0514   AST 63* 43* 36   ALT 32 27 27   BILITOT 0.6 0.4 0.4   PROT 5.1* 5.0* 5.1*   LABALBU 3.3* 3.3* 3.3*   ALKPHOS 51 51 50     Troponin: No results for input(s): TROPONINI in the last 72 hours.   BNP: No results for boluses PRN to correct hypotension  - Rocephin and azithromycin  - Decadron  - Barticinib     CKD IV - Up to 4.2 from 3.9 yesterday  History of AIN from . Patient follows up with [facility] nephrology outpatient. - monitor Cr, UOP; UOP beginning to increase  - avoid nephrotoxin agents  - Bladder scan: monitoring urinary retention     Hyperglycemia  - Glucose: 275 (8/4)  - Increased lantus to 12  - Switched to MDSS     Bradycardia  Pt has persistent bradycardia but asymptomatic since arrival and has dipper to below 30 overnight (8/2); Cardiology is following closely  - Echo: normal findings and LV EF 45-50%  - Pacemaker placed     Chronic Medical Conditions:     3. T2DM: Patient on 19 units of Lantus nightly. Starting patient on low-dose siding scale insulin. POCT before meals and at bedtime. 4. Hyperlipidemia: On Lipitor. 5. Neuropathic pain: Continued home Lyrica 150 mg BID. 6. TE fistula s/p treatment of metastatic osteosarcoma. No aspiration events reported. 7.  Metastatic Osteosarcoma:  Patient follows up with Dr. Deborah Ha from [facility]. Previous treatment:  Adriamycin/cisplatin with intermitted high dose methotrexate followed by amputation. Max/CDDP Q21 days x 4 post -op 2/3/15 - 4/7/2015  Current therapy:  Gemcitabine D1,8 + Docetaxel D8 Q21D. Total cycle 21, on cycle 8. C1D1 04/17/2019     Code Status: Full  FEN: Full  PPX: subq heparin  DISPO: IVAN Serrato DO, PGY-1  08/04/22  9:45 AM    This patient has been staffed and discussed with Grace Marsh MD.     Patient seen and examined, plan of care discussed with residents. Agree with their assessment and plan with following addendum:  Patient requires revision of pacemaker tomorrow. He is asymptomatic. Creatinine stabilized but still very elevated at 4.0. respiratory status is stable. Cont treatment for PNA and COVID.        Grace Marsh MD

## 2022-08-04 NOTE — PROGRESS NOTES
Electrophysiology - PROGRESS NOTE    Admit Date: 7/30/2022     Chief Complaint: SSS     Interval History:   Patient seen and examined and notes reviewed. Patient is being followed for SSS. Patient admitted to the hospital after suffering a fall at home. He was noted to be febrile, hypotensive, tachycardic and tested positive for COVID. He was noted to have severe bradycardia and hypotension that was not responding to fluids. Telemetry revealed severe sinus bradycardia with intermittent junctional ventricular escape with the longest sinus pause lasting about 9 seconds. He underwent dual-chamber [employer] pacemaker yesterday with Dr. Ino Garces. Left chest incision CDI. Device check this morning concerning for possible atrial lead dislodgment. No complaints of CP, SOB, dizziness, palpitations.     In: 240 [P.O.:240]  Out: 1150    Wt Readings from Last 2 Encounters:   08/04/22 214 lb 4.6 oz (97.2 kg)   06/24/22 201 lb 11.5 oz (91.5 kg)         Data:   Scheduled Meds:   Scheduled Meds:   insulin lispro  0-8 Units SubCUTAneous TID WC    insulin lispro  0-4 Units SubCUTAneous Nightly    insulin glargine  12 Units SubCUTAneous Daily    sodium chloride flush  5-40 mL IntraVENous 2 times per day    cefTRIAXone (ROCEPHIN) IV  1,000 mg IntraVENous Q24H    dexamethasone  6 mg IntraVENous Q24H    pantoprazole  40 mg IntraVENous Daily    saccharomyces boulardii  250 mg Oral BID    baricitinib  1 mg Oral Daily    azithromycin  500 mg IntraVENous Q24H    aspirin  81 mg Oral Daily    atorvastatin  20 mg Oral Daily    sodium chloride flush  5-40 mL IntraVENous 2 times per day    heparin (porcine)  5,000 Units SubCUTAneous 3 times per day    sodium chloride flush  5-40 mL IntraVENous 2 times per day     Continuous Infusions:   sodium chloride      sodium chloride 10 mL (08/01/22 8174)    sodium chloride      dextrose       PRN Meds:.sodium chloride flush, sodium chloride, diphenhydrAMINE, melatonin, sodium chloride flush, sodium chloride, ondansetron **OR** ondansetron, polyethylene glycol, acetaminophen **OR** acetaminophen, benzonatate, sodium chloride flush, sodium chloride, glucose, dextrose bolus **OR** dextrose bolus, glucagon (rDNA), dextrose  Continuous Infusions:   sodium chloride      sodium chloride 10 mL (08/01/22 9669)    sodium chloride      dextrose         Intake/Output Summary (Last 24 hours) at 8/4/2022 0956  Last data filed at 8/4/2022 0400  Gross per 24 hour   Intake 240 ml   Output 1150 ml   Net -910 ml       CBC:   Lab Results   Component Value Date/Time    WBC 3.4 08/04/2022 05:14 AM    HGB 10.3 08/04/2022 05:14 AM     08/04/2022 05:14 AM     BMP:  Lab Results   Component Value Date/Time     08/04/2022 05:14 AM    K 4.4 08/04/2022 05:14 AM    K 3.9 08/01/2022 04:40 AM     08/04/2022 05:14 AM    CO2 23 08/04/2022 05:14 AM    BUN 68 08/04/2022 05:14 AM    CREATININE 4.0 08/04/2022 05:14 AM    GLUCOSE 275 08/04/2022 05:14 AM    GLUCOSE 302 07/07/2017 02:59 PM     INR:   Lab Results   Component Value Date/Time    INR 1.23 08/01/2022 08:10 PM    INR 1.18 08/07/2019 11:32 AM    INR 1.01 12/01/2014 03:35 AM        CARDIAC LABS  ENZYMES:No results for input(s): CKMB, CKMBINDEX, TROPONINI in the last 72 hours.     Invalid input(s): CKTOTAL;3  FASTING LIPID PANEL:  Lab Results   Component Value Date/Time    TSH 0.411 07/07/2017 02:59 PM     LIVER PROFILE:  Lab Results   Component Value Date/Time    AST 36 08/04/2022 05:14 AM    AST 43 08/03/2022 04:28 AM    ALT 27 08/04/2022 05:14 AM    ALT 27 08/03/2022 04:28 AM       -----------------------------------------------------------------  Telemetry: Personally reviewed  sinus, occ     Objective:   Vitals: /74   Pulse 60   Temp 97.9 °F (36.6 °C) (Oral)   Resp 18   Ht 6' (1.829 m)   Wt 214 lb 4.6 oz (97.2 kg)   SpO2 93%   BMI 29.06 kg/m²   General appearance: alert, appears stated age and cooperative, No acute distress   Skin: Skin color, texture, turgor normal. No rashes or ecchymosis. Neck: no JVD, supple, symmetrical, trachea midline   Lungs: , no accessory muscle use, no respiratory distress  Heart: RRR  Extremities: No edema, DP +  Psychiatric: normal insight and affect    Patient Active Problem List:     Chondroblastic osteosarcoma ([facility] [address]     Micturition syncope     Fever and chills     Diabetes (HCC)     Left BKA     Port-A-Cath in place     Cellulitis     Stage 3b chronic kidney disease (HCC)     Sepsis ([facility] [address]     SSS (sick sinus syndrome) ([facility])     Bradycardia     Septicemia ([facility])        Assessment & Plan:    1. COVID positive  2. SSS  3.pacemaker  4.osteosarcoma    Minerva Nair Is a 66 y.o. man with PMH osteosarcoma of the left leg with mets to the lung and thyroid, s/p left BKA, CKD stage III, history of tracheoesophageal fistula, HTN who presented after a fall at home, noted to be febrile, hypotensive, tachycardic, tested positive for COVID, noted to have severe bradycardia and hypotension, noted to have SSS on telemetry, s/p [facility] MDT PPM (8/3/2022, Dr. Calos Wilson)    SSS  - s/p [facility] MDT PPM (8/3/2022, Dr. Calos Wilson)  - Left chest incision with small amount bloody drainage noted  - Device check shows atrial lead dislodged  - Reviewed with Dr. Calos Wilson and will plan for atrial lead revision tomorrow afternoon  - Npo at midnight  - D/w pt and he is aware of plan  - Reviewed post procedure instructions with patient  - Primary nurse updated    Electronically signed by MARIE Cota CNP on 8/4/22 at 9:56 AM EDT    [facility]    I spent a total of 40 minutes and greater than 50% of the time was spent counseling with Minerva Nair and coordinating care regarding her diagnosis, treatments and plan of care.

## 2022-08-04 NOTE — PLAN OF CARE
Problem: Skin/Tissue Integrity  Goal: Absence of new skin breakdown  Description: 1. Monitor for areas of redness and/or skin breakdown  2. Assess vascular access sites hourly  3. Every 4-6 hours minimum:  Change oxygen saturation probe site  4. Every 4-6 hours:  If on nasal continuous positive airway pressure, respiratory therapy assess nares and determine need for appliance change or resting period. 8/3/2022 2246 by Ava Platt RN  Outcome: Progressing   S/p PPM placement. Left CW with dressing. CDI. Sling and swath in place. Problem: Safety - Adult  Goal: Free from fall injury  Patient meets ZHENG fall risk guidelines. Non skid footwear with ambulation. Bed in lowest position. Call light in reach. Bed alarm activated. Reminded to call for assistance before exiting bed. Continue safety precautions. Problem: Pain  Goal: Verbalizes/displays adequate comfort level or baseline comfort level  8/3/2022 2246 by Ava Platt RN  Outcome: Progressing   Patient denies any pain this shift.

## 2022-08-04 NOTE — PROGRESS NOTES
Attempted to call pt son Sanjuanita Trejo for shift update but could not reach him. Call back number provided.

## 2022-08-04 NOTE — PROGRESS NOTES
The [address] [place] [address]  Cardiology Daily Progress Note  8/4/2022,11:24 AM      Roxane Oro MD ,[address]  Primary cardiologist:    Admit Date:  7/30/2022  Hospital Day: Hospital Day: 6  Subjective:  Mr. Deborah Reeves is awake and alert. Did have a pacemaker implanted yesterday. Some suggestion that the atrial lead may have migrated. He is to be seen by Dr. Krish Graham and further recommendations to be determined. He is otherwise in eating well and in good spirits. Rhythm today is sinus at 60 actually RV paced. Objective:   /74   Pulse 60   Temp 97.9 °F (36.6 °C) (Oral)   Resp 18   Ht 6' (1.829 m)   Wt 214 lb 4.6 oz (97.2 kg)   SpO2 93%   BMI 29.06 kg/m²     Intake/Output Summary (Last 24 hours) at 8/4/2022 1124  Last data filed at 8/4/2022 0400  Gross per 24 hour   Intake 240 ml   Output 1150 ml   Net -910 ml       TELEMETRY: RV paced     Physical Examination:    Vitals:    08/03/22 2307 08/04/22 0400 08/04/22 0607 08/04/22 0922   BP: (!) 96/51 95/60  125/74   Pulse: 60 54  60   Resp: 18 18  18   Temp: 98.1 °F (36.7 °C) 98 °F (36.7 °C)  97.9 °F (36.6 °C)   TempSrc: Oral Oral  Oral   SpO2: 97% 93%  93%   Weight:   214 lb 4.6 oz (97.2 kg)    Height:            Constitutional and General Appearance:  Healthy. And alert . HEENT: eyes and ears intact. No nasal masses  THYROID: not enlarged  LUNGS:  Clear to auscultation and percussion  HEART and VASCULAR:  The apical impulses not displaced  Heart tones are crisp and normal  Cervical veins are not engorged  The carotid upstroke is normal in amplitude and contour without delay or bruit  Peripheral pulses are symmetrical and full  There is no clubbing, cyanosis of the extremities. No peripheral edema  Femoral Arteries: Left AKA and some mild edema. No masses or tenderness  Liver/Spleen: No Abnormalities Noted  NEUROLOGICAL:  . Moves all extremities to command. Cranial nerves 2-12 are in tact.   PSYCHIATRIC: alert and lucid and oriented and appropriate  SKIN: No lesions or rashes  LYMPH NODES: none enlarged      Medications:    [START ON 8/5/2022] insulin glargine  17 Units SubCUTAneous Daily    insulin lispro  0-16 Units SubCUTAneous TID WC    insulin lispro  0-4 Units SubCUTAneous Nightly    [START ON 8/5/2022] pantoprazole  40 mg Oral QAM AC    azithromycin  500 mg Oral Daily    dexamethasone  6 mg Oral Daily    sodium chloride flush  5-40 mL IntraVENous 2 times per day    cefTRIAXone (ROCEPHIN) IV  1,000 mg IntraVENous Q24H    saccharomyces boulardii  250 mg Oral BID    baricitinib  1 mg Oral Daily    aspirin  81 mg Oral Daily    atorvastatin  20 mg Oral Daily    sodium chloride flush  5-40 mL IntraVENous 2 times per day    heparin (porcine)  5,000 Units SubCUTAneous 3 times per day    sodium chloride flush  5-40 mL IntraVENous 2 times per day      sodium chloride      sodium chloride 10 mL (08/01/22 2246)    sodium chloride      dextrose       sodium chloride flush, sodium chloride, diphenhydrAMINE, melatonin, sodium chloride flush, sodium chloride, ondansetron **OR** ondansetron, polyethylene glycol, acetaminophen **OR** acetaminophen, benzonatate, sodium chloride flush, sodium chloride, glucose, dextrose bolus **OR** dextrose bolus, glucagon (rDNA), dextrose    Lab Data:  CBC:   Recent Labs     08/02/22 0455 08/03/22 0428 08/04/22  0514   WBC 5.0 2.4* 3.4*   HGB 10.6* 10.5* 10.3*   HCT 31.1* 30.9* 30.0*   .5* 101.1* 99.9    121* 121*     BMP:   Recent Labs     08/02/22 0455 08/03/22 0428 08/04/22  0514    137 142   K 4.1 4.3 4.4    100 104   CO2 20* 25 23   PHOS 4.3 4.3 4.2   BUN 52* 60* 68*   CREATININE 3.9* 4.2* 4.0*     Troponin:Troponin:   Lab Results   Component Value Date/Time    TROPONINI <0.01 07/30/2022 07:27 PM     LIVER PROFILE:   Recent Labs     08/02/22 0455 08/03/22 0428 08/04/22  0514   AST 63* 43* 36   ALT 32 27 27   BILITOT 0.6 0.4 0.4   ALKPHOS 51 51 50 PT/INR:   Recent Labs     08/01/22 2010   PROTIME 15.4*   INR 1.23*     APTT: No results for input(s): APTT in the last 72 hours. BNP:  No results for input(s): BNP in the last 72 hours. IMAGING: as noted    Assessment:  Patient Active Problem List    Diagnosis Date Noted    SSS (sick sinus syndrome) ([facility] 08/03/2022    Bradycardia 08/03/2022    Septicemia ([facility] 08/03/2022    Sepsis ([facility] 07/30/2022    Stage 3b chronic kidney disease ([facility] 06/26/2022    Cellulitis 06/24/2022    Port-A-Cath in place 08/10/2017    Left BKA 12/09/2014    Diabetes ([facility] 10/23/2014    Fever and chills 10/16/2014    Micturition syncope 10/09/2014    Chondroblastic osteosarcoma ([facility] 09/29/2014       Plan:   Continue current medications. Core Measures:  Discharge instructions:   LVEF documented:   ACEI for LV dysfunction:      Significant and symptomatic bradycardia. Pacemaker implant yesterday. May be a lead migration issue at this time. To be seen by Dr. Álvaro Lozoya and have further evaluation. Thanks for allowing me  the opportunity to participate in the evaluation and care of your patient.  If there are questions please call me [phone]    This note was likely completed using voice recognition technology and may contain unintended grammatical, phraseology and/or punctuation  errors      Avril Dwyer MD ,[facility]  8/4/2022 11:24 AM

## 2022-08-04 NOTE — PROGRESS NOTES
[facility] NEPHROLOGY    [facility]              [phone]                      Interval History and Plan:    Patient seen at bedside. Feel fine  S/P pacemaker placement and now HR and BP appear better  Urine output is 1150 ml documented. Cr start to trend down. No SOB on RA  Diarrhea resolved. Able to have oral intake. Denied any urinary problem. Does not have Jones       Oral hydration  IVF bolus as needed for hypotension. Check bladder scan  Q Shift and straight cath as needed for urinary retention. Trend vitals and I/O and labs closely  No urgent indication for dialysis but hopefully will be able to avoid it with improving urine output  Monitor closely      D/W patient at bedside. Pt agree. Assessment :     Acute on chronic kidney disease stage IV:  Baseline creatinine has been around 2.4. Creatinine at the time of admission was 2.9. Patient's chronic kidney disease worse thought secondary to underlying diabetes with progressive disease along with Immunotherapy and previous diuretic use  UA: 0- 2 RBC, 3- 4 WBC  Neg protein on UA, UPCR 0.2  US: Negative  Acute decompensation is likely related to relative hypotension      Has a history of AIN from Keytruda    Hypotension. Monitor   Stopped Midodrine due to bradycardia. Now S/P PM placement.      Type 2 diabetes mellitus  Metastatic osteosarcoma  Neuropathy      [facility] Nephrology would like to thank Alysa Longoria MD   for opportunity to serve this patient      Please call with questions at-   24 Hrs Answering service [phone] or  7 am- 5 pm via Perfect serve or cell phone  Dana Means MD          CC/reason for consult :     MILEY/CKD     HPI :     Yoko Lockhart is a 66 y.o. 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 PMH/PSH/SH/Family History:     Past Medical History:   Diagnosis Date    Arthritis     left knee and hands bilateral    Chondroblastic osteosarcoma ([facility] 2014    Left distal tibia    Diabetes mellitus ([facility]     type 2 1999    Hyperlipidemia     Unspecified cerebral artery occlusion with cerebral infarction 2008       Past Surgical History:   Procedure Laterality Date    ABDOMEN SURGERY      CHOLECYSTECTOMY      CHOLECYSTECTOMY      Laparoscopic    COLONOSCOPY      COLONOSCOPY      X3.  Last one 2014    ENDOSCOPY, COLON, DIAGNOSTIC      Upper GI prior to Cholecystectomy    FOREIGN BODY REMOVAL      LEG AMPUTATION BELOW KNEE Left 14    LEG SURGERY Left 14    biopsy left lower leg    LIPOMA RESECTION  2013    OTHER SURGICAL HISTORY Left 10/22/2014    LEFT SUBCLAVIAN PORT REMOVAL              OTHER SURGICAL HISTORY Right 10/27/14    INSERTION OF SINGLE LUMEN PORT A CATH    TONSILLECTOMY      TONSILLECTOMY      childhood    TUNNELED VENOUS PORT PLACEMENT Right     Power Port       Social History     Socioeconomic History    Marital status: Single     Spouse name: Not on file    Number of children: Not on file    Years of education: Not on file    Highest education level: Not on file   Occupational History    Not on file   Tobacco Use    Smoking status: Former     Packs/day: 1.00     Years: 12.00     Pack years: 12.00     Types: Cigarettes     Quit date: 1982     Years since quittin.6    Smokeless tobacco: Never    Tobacco comments:     quit    Substance and Sexual Activity    Alcohol use: Yes     Comment: beer daily    Drug use: No    Sexual activity: Not Currently   Other Topics Concern    Not on file   Social History Narrative    ** Merged History Encounter **          Social Determinants of Health     Financial Resource Strain: Not on file   Food Insecurity: Not on file   Transportation Needs: Not on file   Physical Activity: Not on file Stress: Not on file   Social Connections: Not on file   Intimate Partner Violence: Not on file   Housing Stability: Not on file           Problem Relation Age of Onset    Emphysema Mother     Other Brother           Medication:      [START ON 8/5/2022] insulin glargine  17 Units SubCUTAneous Daily    insulin lispro  0-16 Units SubCUTAneous TID WC    insulin lispro  0-4 Units SubCUTAneous Nightly    [START ON 8/5/2022] pantoprazole  40 mg Oral QAM AC    azithromycin  500 mg Oral Daily    dexamethasone  6 mg Oral Daily    sodium chloride flush  5-40 mL IntraVENous 2 times per day    cefTRIAXone (ROCEPHIN) IV  1,000 mg IntraVENous Q24H    saccharomyces boulardii  250 mg Oral BID    baricitinib  1 mg Oral Daily    aspirin  81 mg Oral Daily    atorvastatin  20 mg Oral Daily    sodium chloride flush  5-40 mL IntraVENous 2 times per day    heparin (porcine)  5,000 Units SubCUTAneous 3 times per day    sodium chloride flush  5-40 mL IntraVENous 2 times per day     sodium chloride flush, sodium chloride, diphenhydrAMINE, melatonin, sodium chloride flush, sodium chloride, ondansetron **OR** ondansetron, polyethylene glycol, acetaminophen **OR** acetaminophen, benzonatate, sodium chloride flush, sodium chloride, glucose, dextrose bolus **OR** dextrose bolus, glucagon (rDNA), dextrose       Vitals :     /74   Pulse 60   Temp 97.9 °F (36.6 °C) (Oral)   Resp 18   Ht 6' (1.829 m)   Wt 214 lb 4.6 oz (97.2 kg)   SpO2 93%   BMI 29.06 kg/m²     I & O :       Intake/Output Summary (Last 24 hours) at 8/4/2022 1134  Last data filed at 8/4/2022 0400  Gross per 24 hour   Intake 240 ml   Output 750 ml   Net -510 ml          Physical Examination :     General appearance: Anxious- no, distressed- no, in good spirits- yes  HEENT: Lips- normal, teeth- ok , oral mucosa- moist  Neck : Mass- no, appears symmetrical, JVD- not visible  Respiratory: Respiratory effort- , wheeze- no, crackles - no  Cardiovascular:  Ausculation- S1S2, Edema mild  Abdomen: visible mass- no, distention- no, scar- no, tenderness- no                            hepatosplenomegaly-  no  Musculoskeletal:  clubbing no,cyanosis- no , digital ischemia- no                           muscle strength- grossly normal , tone - grossly normal  Right BKA   Skin: rashes- no , ulcers- no, induration- no, tightening - no  Psychiatric:  mood stable effect normal   CNS: AAO x 3      LABS:     Recent Labs     08/02/22 0455 08/03/22  0428 08/04/22  0514   WBC 5.0 2.4* 3.4*   HGB 10.6* 10.5* 10.3*   HCT 31.1* 30.9* 30.0*    121* 121*       Recent Labs     08/02/22 0455 08/03/22 0428 08/03/22  0429 08/03/22  1314 08/04/22  0514    137  --   --  142   K 4.1 4.3  --   --  4.4    100  --   --  104   CO2 20* 25  --   --  23   BUN 52* 60*  --   --  68*   CREATININE 3.9* 4.2*  --   --  4.0*   GLUCOSE 146* 272*  --   --  275*   MG  --   --  1.80 2.00  --    PHOS 4.3 4.3  --   --  4.2

## 2022-08-04 NOTE — PLAN OF CARE
Problem: Discharge Planning  Goal: Discharge to home or other facility with appropriate resources  Outcome: Progressing  Flowsheets (Taken 8/3/2022 2000 by Dianne Garcia, RN)  Discharge to home or other facility with appropriate resources: Identify barriers to discharge with patient and caregiver     Problem: Skin/Tissue Integrity  Goal: Absence of new skin breakdown  Description: 1. Monitor for areas of redness and/or skin breakdown  2. Assess vascular access sites hourly  3. Every 4-6 hours minimum:  Change oxygen saturation probe site  4. Every 4-6 hours:  If on nasal continuous positive airway pressure, respiratory therapy assess nares and determine need for appliance change or resting period. Outcome: Progressing     Problem: Safety - Adult  Goal: Free from fall injury  Outcome: Progressing  Flowsheets (Taken 8/4/2022 1748)  Free From Fall Injury: Instruct family/caregiver on patient safety     Problem: Pain  Goal: Verbalizes/displays adequate comfort level or baseline comfort level  Outcome: Progressing  Flowsheets (Taken 8/4/2022 0922)  Verbalizes/displays adequate comfort level or baseline comfort level: Encourage patient to monitor pain and request assistance     Problem: ABCDS Injury Assessment  Goal: Absence of physical injury  Outcome: Progressing  Flowsheets (Taken 8/4/2022 1748)  Absence of Physical Injury: Implement safety measures based on patient assessment     Problem: Neurosensory - Adult  Goal: Achieves stable or improved neurological status  Outcome: Progressing  Flowsheets (Taken 8/4/2022 0922)  Achieves stable or improved neurological status: Assess for and report changes in neurological status  Goal: Absence of seizures  Outcome: Progressing  Flowsheets (Taken 8/3/2022 2000 by Dianne Garcia RN)  Absence of seizures: Monitor for seizure activity.   If seizure occurs, document type and location of movements and any associated apnea  Goal: Remains free of injury related to seizures activity  Outcome: Progressing  Flowsheets (Taken 8/4/2022 0922)  Remains free of injury related to seizure activity: Maintain airway, patient safety  and administer oxygen as ordered  Goal: Achieves maximal functionality and self care  Outcome: Progressing  Flowsheets (Taken 8/4/2022 0922)  Achieves maximal functionality and self care: Monitor swallowing and airway patency with patient fatigue and changes in neurological status     Problem: Cardiovascular - Adult  Goal: Maintains optimal cardiac output and hemodynamic stability  Outcome: Progressing  Flowsheets (Taken 8/3/2022 0952)  Maintains optimal cardiac output and hemodynamic stability: Monitor blood pressure and heart rate  Goal: Absence of cardiac dysrhythmias or at baseline  Outcome: Progressing  Flowsheets (Taken 8/3/2022 0952)  Absence of cardiac dysrhythmias or at baseline: Monitor cardiac rate and rhythm     Problem: Respiratory - Adult  Goal: Achieves optimal ventilation and oxygenation  Outcome: Progressing  Flowsheets (Taken 8/2/2022 0259 by Duffy Heimlich, RN)  Achieves optimal ventilation and oxygenation:   Assess for changes in respiratory status   Assess for changes in mentation and behavior   Position to facilitate oxygenation and minimize respiratory effort   Oxygen supplementation based on oxygen saturation or arterial blood gases     Problem: Gastrointestinal - Adult  Goal: Minimal or absence of nausea and vomiting  Outcome: Progressing  Goal: Maintains or returns to baseline bowel function  Outcome: Progressing  Flowsheets (Taken 8/4/2022 0922)  Maintains or returns to baseline bowel function:   Assess bowel function   Encourage oral fluids to ensure adequate hydration  Goal: Maintains adequate nutritional intake  Outcome: Progressing  Goal: Establish and maintain optimal ostomy function  Outcome: Progressing     Problem: Skin/Tissue Integrity - Adult  Goal: Skin integrity remains intact  Outcome: Progressing  Flowsheets  Taken 8/4/2022

## 2022-08-05 ENCOUNTER — PROCEDURE VISIT (OUTPATIENT)
Dept: CARDIOLOGY CLINIC | Age: 72
End: 2022-08-05

## 2022-08-05 ENCOUNTER — APPOINTMENT (OUTPATIENT)
Dept: GENERAL RADIOLOGY | Age: 72
DRG: 853 | End: 2022-08-05
Payer: OTHER GOVERNMENT

## 2022-08-05 DIAGNOSIS — Z95.0 PACEMAKER: Primary | ICD-10-CM

## 2022-08-05 LAB
A/G RATIO: 1.9 (ref 1.1–2.2)
ALBUMIN SERPL-MCNC: 3.3 G/DL (ref 3.4–5)
ALP BLD-CCNC: 51 U/L (ref 40–129)
ALT SERPL-CCNC: 20 U/L (ref 10–40)
ANION GAP SERPL CALCULATED.3IONS-SCNC: 11 MMOL/L (ref 3–16)
AST SERPL-CCNC: 25 U/L (ref 15–37)
BASOPHILS ABSOLUTE: 0 K/UL (ref 0–0.2)
BASOPHILS RELATIVE PERCENT: 0.2 %
BILIRUB SERPL-MCNC: 0.4 MG/DL (ref 0–1)
BUN BLDV-MCNC: 67 MG/DL (ref 7–20)
CALCIUM SERPL-MCNC: 8.3 MG/DL (ref 8.3–10.6)
CHLORIDE BLD-SCNC: 105 MMOL/L (ref 99–110)
CO2: 26 MMOL/L (ref 21–32)
CREAT SERPL-MCNC: 4 MG/DL (ref 0.8–1.3)
EOSINOPHILS ABSOLUTE: 0 K/UL (ref 0–0.6)
EOSINOPHILS RELATIVE PERCENT: 0 %
GFR AFRICAN AMERICAN: 18
GFR NON-AFRICAN AMERICAN: 15
GLUCOSE BLD-MCNC: 177 MG/DL (ref 70–99)
GLUCOSE BLD-MCNC: 196 MG/DL (ref 70–99)
GLUCOSE BLD-MCNC: 220 MG/DL (ref 70–99)
GLUCOSE BLD-MCNC: 227 MG/DL (ref 70–99)
GLUCOSE BLD-MCNC: 232 MG/DL (ref 70–99)
HCT VFR BLD CALC: 31.3 % (ref 40.5–52.5)
HEMOGLOBIN: 10.9 G/DL (ref 13.5–17.5)
INR BLD: 1.07 (ref 0.87–1.14)
LYMPHOCYTES ABSOLUTE: 0.3 K/UL (ref 1–5.1)
LYMPHOCYTES RELATIVE PERCENT: 7.9 %
MCH RBC QN AUTO: 34.5 PG (ref 26–34)
MCHC RBC AUTO-ENTMCNC: 34.7 G/DL (ref 31–36)
MCV RBC AUTO: 99.4 FL (ref 80–100)
MONOCYTES ABSOLUTE: 0.4 K/UL (ref 0–1.3)
MONOCYTES RELATIVE PERCENT: 9.4 %
NEUTROPHILS ABSOLUTE: 3.3 K/UL (ref 1.7–7.7)
NEUTROPHILS RELATIVE PERCENT: 82.5 %
PDW BLD-RTO: 13 % (ref 12.4–15.4)
PERFORMED ON: ABNORMAL
PHOSPHORUS: 4.7 MG/DL (ref 2.5–4.9)
PLATELET # BLD: 126 K/UL (ref 135–450)
PMV BLD AUTO: 8.6 FL (ref 5–10.5)
POTASSIUM SERPL-SCNC: 4.1 MMOL/L (ref 3.5–5.1)
PROTHROMBIN TIME: 13.9 SEC (ref 11.7–14.5)
RBC # BLD: 3.15 M/UL (ref 4.2–5.9)
SODIUM BLD-SCNC: 142 MMOL/L (ref 136–145)
TOTAL PROTEIN: 5 G/DL (ref 6.4–8.2)
WBC # BLD: 4 K/UL (ref 4–11)

## 2022-08-05 PROCEDURE — 99152 MOD SED SAME PHYS/QHP 5/>YRS: CPT | Performed by: INTERNAL MEDICINE

## 2022-08-05 PROCEDURE — 99152 MOD SED SAME PHYS/QHP 5/>YRS: CPT

## 2022-08-05 PROCEDURE — 2060000000 HC ICU INTERMEDIATE R&B

## 2022-08-05 PROCEDURE — 6360000002 HC RX W HCPCS

## 2022-08-05 PROCEDURE — 2500000003 HC RX 250 WO HCPCS

## 2022-08-05 PROCEDURE — 6370000000 HC RX 637 (ALT 250 FOR IP): Performed by: INTERNAL MEDICINE

## 2022-08-05 PROCEDURE — 84100 ASSAY OF PHOSPHORUS: CPT

## 2022-08-05 PROCEDURE — 99233 SBSQ HOSP IP/OBS HIGH 50: CPT | Performed by: INTERNAL MEDICINE

## 2022-08-05 PROCEDURE — 2580000003 HC RX 258

## 2022-08-05 PROCEDURE — 99233 SBSQ HOSP IP/OBS HIGH 50: CPT | Performed by: NURSE PRACTITIONER

## 2022-08-05 PROCEDURE — 33215 REPOSITION PACING-DEFIB LEAD: CPT | Performed by: INTERNAL MEDICINE

## 2022-08-05 PROCEDURE — 2709999900 HC NON-CHARGEABLE SUPPLY

## 2022-08-05 PROCEDURE — 6360000002 HC RX W HCPCS: Performed by: INTERNAL MEDICINE

## 2022-08-05 PROCEDURE — 6370000000 HC RX 637 (ALT 250 FOR IP)

## 2022-08-05 PROCEDURE — 83036 HEMOGLOBIN GLYCOSYLATED A1C: CPT

## 2022-08-05 PROCEDURE — 36415 COLL VENOUS BLD VENIPUNCTURE: CPT

## 2022-08-05 PROCEDURE — 33215 REPOSITION PACING-DEFIB LEAD: CPT

## 2022-08-05 PROCEDURE — 2580000003 HC RX 258: Performed by: STUDENT IN AN ORGANIZED HEALTH CARE EDUCATION/TRAINING PROGRAM

## 2022-08-05 PROCEDURE — 85025 COMPLETE CBC W/AUTO DIFF WBC: CPT

## 2022-08-05 PROCEDURE — 99153 MOD SED SAME PHYS/QHP EA: CPT

## 2022-08-05 PROCEDURE — 71045 X-RAY EXAM CHEST 1 VIEW: CPT

## 2022-08-05 PROCEDURE — 85610 PROTHROMBIN TIME: CPT

## 2022-08-05 PROCEDURE — 2580000003 HC RX 258: Performed by: INTERNAL MEDICINE

## 2022-08-05 PROCEDURE — 80053 COMPREHEN METABOLIC PANEL: CPT

## 2022-08-05 RX ORDER — SODIUM CHLORIDE 9 MG/ML
INJECTION, SOLUTION INTRAVENOUS PRN
Status: DISCONTINUED | OUTPATIENT
Start: 2022-08-05 | End: 2022-08-08 | Stop reason: HOSPADM

## 2022-08-05 RX ORDER — SODIUM CHLORIDE, SODIUM LACTATE, POTASSIUM CHLORIDE, CALCIUM CHLORIDE 600; 310; 30; 20 MG/100ML; MG/100ML; MG/100ML; MG/100ML
INJECTION, SOLUTION INTRAVENOUS CONTINUOUS
Status: ACTIVE | OUTPATIENT
Start: 2022-08-05 | End: 2022-08-05

## 2022-08-05 RX ORDER — SODIUM CHLORIDE 0.9 % (FLUSH) 0.9 %
5-40 SYRINGE (ML) INJECTION EVERY 12 HOURS SCHEDULED
Status: DISCONTINUED | OUTPATIENT
Start: 2022-08-05 | End: 2022-08-08 | Stop reason: HOSPADM

## 2022-08-05 RX ORDER — SODIUM CHLORIDE 0.9 % (FLUSH) 0.9 %
5-40 SYRINGE (ML) INJECTION PRN
Status: DISCONTINUED | OUTPATIENT
Start: 2022-08-05 | End: 2022-08-08 | Stop reason: HOSPADM

## 2022-08-05 RX ADMIN — INSULIN LISPRO 4 UNITS: 100 INJECTION, SOLUTION INTRAVENOUS; SUBCUTANEOUS at 08:43

## 2022-08-05 RX ADMIN — SODIUM CHLORIDE, PRESERVATIVE FREE 10 ML: 5 INJECTION INTRAVENOUS at 08:59

## 2022-08-05 RX ADMIN — CEFTRIAXONE 1000 MG: 1 INJECTION, POWDER, FOR SOLUTION INTRAMUSCULAR; INTRAVENOUS at 08:57

## 2022-08-05 RX ADMIN — DIPHENHYDRAMINE HYDROCHLORIDE 25 MG: 25 TABLET ORAL at 21:51

## 2022-08-05 RX ADMIN — ATORVASTATIN CALCIUM 20 MG: 20 TABLET, FILM COATED ORAL at 08:46

## 2022-08-05 RX ADMIN — Medication 250 MG: at 08:46

## 2022-08-05 RX ADMIN — Medication 250 MG: at 21:43

## 2022-08-05 RX ADMIN — BARICITINIB 1 MG: 2 TABLET, FILM COATED ORAL at 08:46

## 2022-08-05 RX ADMIN — AZITHROMYCIN MONOHYDRATE 500 MG: 250 TABLET ORAL at 08:46

## 2022-08-05 RX ADMIN — SODIUM CHLORIDE, POTASSIUM CHLORIDE, SODIUM LACTATE AND CALCIUM CHLORIDE: 600; 310; 30; 20 INJECTION, SOLUTION INTRAVENOUS at 17:02

## 2022-08-05 RX ADMIN — ASPIRIN 81 MG: 81 TABLET, COATED ORAL at 08:46

## 2022-08-05 RX ADMIN — DEXAMETHASONE 6 MG: 4 TABLET ORAL at 08:46

## 2022-08-05 ASSESSMENT — ENCOUNTER SYMPTOMS
NAUSEA: 0
SHORTNESS OF BREATH: 0
COUGH: 1
CONSTIPATION: 0
DIARRHEA: 0

## 2022-08-05 ASSESSMENT — PAIN SCALES - GENERAL
PAINLEVEL_OUTOF10: 0

## 2022-08-05 NOTE — PROGRESS NOTES
This RN was contacted by radiologist following up on chest x-ray for suspicion of possible pneumothorax. Resident physician notified, see orders.

## 2022-08-05 NOTE — CARE COORDINATION
CM spoke with patient regarding discharge plans. He is now agreeable to acute rehab placement to gain strength back before returning home. CM faxed referrals. JAVED spoke with Sherry Nick at [facility], they are reviewing pt. They can take patient's at 6th day after COVID testing +. JAVED spoke with Erlin LOPEZ for [facility] [place]. She is working on her part of getting approval.  CM faxed clinicals to [phone] for review. 2:27 PM  JAVED spoke with Sherry Nick at [facility], pending auth from [facility] they can take when medically stable.       Argentina Beaver, RN, BSN,   4th Floor Progressive Care Unit  [phone]

## 2022-08-05 NOTE — PLAN OF CARE
Problem: Discharge Planning  Goal: Discharge to home or other facility with appropriate resources  Outcome: Progressing  Flowsheets (Taken 8/3/2022 2000 by Rick Kerns RN)  Discharge to home or other facility with appropriate resources: Identify barriers to discharge with patient and caregiver     Problem: Skin/Tissue Integrity  Goal: Absence of new skin breakdown  Description: 1. Monitor for areas of redness and/or skin breakdown  2. Assess vascular access sites hourly  3. Every 4-6 hours minimum:  Change oxygen saturation probe site  4. Every 4-6 hours:  If on nasal continuous positive airway pressure, respiratory therapy assess nares and determine need for appliance change or resting period.   Outcome: Progressing     Problem: Safety - Adult  Goal: Free from fall injury  Outcome: Progressing  Flowsheets (Taken 8/4/2022 1748)  Free From Fall Injury: Instruct family/caregiver on patient safety     Problem: Pain  Goal: Verbalizes/displays adequate comfort level or baseline comfort level  Outcome: Progressing  Flowsheets (Taken 8/4/2022 0922)  Verbalizes/displays adequate comfort level or baseline comfort level: Encourage patient to monitor pain and request assistance     Problem: ABCDS Injury Assessment  Goal: Absence of physical injury  Outcome: Progressing  Flowsheets (Taken 8/4/2022 1748)  Absence of Physical Injury: Implement safety measures based on patient assessment     Problem: Neurosensory - Adult  Goal: Achieves stable or improved neurological status  Outcome: Progressing  Goal: Absence of seizures  Outcome: Progressing  Goal: Remains free of injury related to seizures activity  Outcome: Progressing  Goal: Achieves maximal functionality and self care  Outcome: Progressing     Problem: Cardiovascular - Adult  Goal: Maintains optimal cardiac output and hemodynamic stability  Outcome: Progressing  Flowsheets (Taken 8/3/2022 6566)  Maintains optimal cardiac output and hemodynamic stability: Monitor blood pressure and heart rate  Goal: Absence of cardiac dysrhythmias or at baseline  Outcome: Progressing  Flowsheets (Taken 8/3/2022 0952)  Absence of cardiac dysrhythmias or at baseline: Monitor cardiac rate and rhythm     Problem: Respiratory - Adult  Goal: Achieves optimal ventilation and oxygenation  Outcome: Progressing  Flowsheets (Taken 8/2/2022 0259 by Vania Juan RN)  Achieves optimal ventilation and oxygenation:   Assess for changes in respiratory status   Assess for changes in mentation and behavior   Position to facilitate oxygenation and minimize respiratory effort   Oxygen supplementation based on oxygen saturation or arterial blood gases     Problem: Gastrointestinal - Adult  Goal: Minimal or absence of nausea and vomiting  Outcome: Progressing  Goal: Maintains or returns to baseline bowel function  Outcome: Progressing  Flowsheets (Taken 8/4/2022 5400)  Maintains or returns to baseline bowel function:   Assess bowel function   Encourage oral fluids to ensure adequate hydration  Goal: Maintains adequate nutritional intake  Outcome: Progressing  Goal: Establish and maintain optimal ostomy function  Outcome: Progressing     Problem: Skin/Tissue Integrity - Adult  Goal: Skin integrity remains intact  Outcome: Progressing  Goal: Incisions, wounds, or drain sites healing without S/S of infection  Outcome: Progressing  Goal: Oral mucous membranes remain intact  Outcome: Progressing

## 2022-08-05 NOTE — PROGRESS NOTES
[facility]   Cardiology  Note   Dr Juan Porter MD, Chantal Hair RN, FNP APRN CVNP  Date: 8/5/2022  Admit Date: 7/30/2022       CC: fever CC: Fever, Fatigue  COVID-pneumonia  Cardiology consult for bradycardia     PMH: Chondroblastic osteosarcoma left leg 2014  history of osteosarcoma. He is status posttreatment as shown above. 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 Device check this morning concerning for possible atrial lead dislodgment   Per Chelo SANTOS note: Device check shows atrial lead dislodged  Reviewed with Dr. Laila Espana and will plan for atrial lead revision tomorrow afternoon  8/5/2022 plan for revision of lead this afternoon  remains in Covid isolation / VSS hr 60s   acute on chronic kidney disease stage IV: Dr Lau Life following     8/3/2022 PPM   Insertion of MRI compatible right ventricular pacing lead under fluoroscopy. Insertion of MRI compatible right atrial lead under fluoroscopy  Insertion of a MRI compatible dual chamber Pacemaker. Electronic analysis of lead and device. TTE 8/2022  Left ventricular cavity size is normal. Normal left ventricular wall thickness. Overall left ventricular systolic function appears mildly reduced with an ejection fraction of 45-50%. Indeterminate diastolic function. Mild to moderate tricuspid regurgitation. Estimated pulmonary artery systolic pressure is at 54 mmHg assuming a right atrial pressure of 15 mmHg. Patient seen and examined. Clinical notes reviewed.  Telemetry reviewed / Pertinent labs, diagnostic, device, and imaging results reviewed as a part of this visit  I spent a total of 35 minutes and greater than 50% of the time was spent counseling with patient  coordinating care regarding her diagnosis, treatments and plan of care    Scheduled Meds:   insulin glargine  17 Units SubCUTAneous Daily    insulin lispro  0-16 Units SubCUTAneous TID WC    insulin lispro  0-4 Units SubCUTAneous Nightly    pantoprazole  40 mg Oral QAM AC    dexamethasone  6 mg Oral Daily    sodium chloride flush  5-40 mL IntraVENous 2 times per day    cefTRIAXone (ROCEPHIN) IV  1,000 mg IntraVENous Q24H    saccharomyces boulardii  250 mg Oral BID    baricitinib  1 mg Oral Daily    aspirin  81 mg Oral Daily    atorvastatin  20 mg Oral Daily    sodium chloride flush  5-40 mL IntraVENous 2 times per day    [Held by provider] heparin (porcine)  5,000 Units SubCUTAneous 3 times per day    sodium chloride flush  5-40 mL IntraVENous 2 times per day       Vitals:    08/05/22 0833   BP: (!) 144/78   Pulse: 61   Resp: 16   Temp: 97.6 °F (36.4 °C)   SpO2: 98%      In: 960 [P.O.:960]  Out: 475    Wt Readings from Last 3 Encounters:   08/05/22 214 lb 1.1 oz (97.1 kg)   06/24/22 201 lb 11.5 oz (91.5 kg)   08/07/19 194 lb (88 kg)       Intake/Output Summary (Last 24 hours) at 8/5/2022 1221  Last data filed at 8/4/2022 1700  Gross per 24 hour   Intake 240 ml   Output 200 ml   Net 40 ml       Telemetry: Personally Reviewed    Constitutional: Cooperative and in no apparent distress, and appears well nourished  Skin: Warm and pink; no pallor, cyanosis, clubbing, or bruising   HEENT: Symmetric and normocephalic. Cardiovascular: Regular rate and rhythm. S1/S2 present   Respiratory: Respirations symmetric and unlabored. Lungs clear to auscultation bilaterally, no wheezing, crackles, or rhonchi  Gastrointestinal: Abdomen soft and round. Bowel sounds normoactive without tenderness or masses.   Musculoskeletal: Bilateral upper and lower extremity strength 5/5 with full ROM  Neurologic/Psych: Awake and orientated to person, place and time. Calm affect, appropriate mood    Patient Active Problem List    Diagnosis Date Noted    COVID-19 08/04/2022    Pacemaker 08/04/2022    SSS (sick sinus syndrome) ([facility] [address] 08/03/2022    Bradycardia 08/03/2022    Septicemia ([facility] [address] 08/03/2022    Sepsis ([address] 07/30/2022    Stage 3b chronic kidney disease ([facility] [address] 06/26/2022    Cellulitis 06/24/2022    Port-A-Cath in place 08/10/2017    Left BKA 12/09/2014    Diabetes ([facility] [address] 10/23/2014    Fever and chills 10/16/2014    Micturition syncope 10/09/2014    Chondroblastic osteosarcoma ([facility] [address] 09/29/2014        Assessment     Admitted with fever   COVID-pneumonia  In isolation     consult for bradycardia     Not on negative chronotropic medications  SSS /  leading on to hypoperfusion and worsening acute kidney injury  significant bradycardia and PPM implanted 8/23/2022  plan for revision of lead this afternoon     TTE 8/2022  Left ventricular cavity size is normal. Normal left ventricular wall thickness. Overall left ventricular systolic function appears mildly reduced with an ejection fraction of 45-50%. Indeterminate diastolic function. Mild to moderate tricuspid regurgitation. Estimated pulmonary artery systolic pressure is at 54 mmHg assuming a right atrial pressure of 15 mmHg. 8/3/2022 PPM   Insertion of MRI compatible right ventricular pacing lead under fluoroscopy. Insertion of MRI compatible right atrial lead under fluoroscopy  Insertion of a MRI compatible dual chamber Pacemaker. Electronic analysis of lead and device. Hx Chondroblastic osteosarcoma left leg 2014  osteosarcoma. He is status posttreatment as shown above.   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 Stefani Junito APRN-CNP-CVNP  Interventional cardiology note  Patient's atrial lead apparently has migrated. Going back today for repositioning with Dr. Bradford García. From a cardiac perspective I think he is otherwise stable. He has been ventricular paced. At this time general cardiology will sign off. He will be followed by EP at discharge and followed up by EP after discharge.   Joe Burr MD, [facility]

## 2022-08-05 NOTE — PROGRESS NOTES
Post op device programming evaluation by [address] [place] [address] representative completed at [facility] for patient's dual chamber pacemaker. EP physician will review. See interrogation under cardiology tab in the [facility] [address] field for more details.

## 2022-08-05 NOTE — PROGRESS NOTES
Physical/Occupational Therapy Treatment Hold        Per RN, pt is going down for a lead revision on a pacemaker he just received yesterday in a few minutes. Pt is currently on hold for all therapy. Will follow up at a later time.      Shayy Ayala , PT, DPT #[id number]  Lupis JAMISON

## 2022-08-05 NOTE — PROCEDURES
[facility]     Electrophysiology Procedure Note       Date of Procedure: 8/5/2022  Patient's Name: Aileen Canales  YOB: 1950   Medical Record Number: [id number]  Procedure Performed by: Carmin Spatz MD    Procedures performed:  Atrial lead reposition  Electronic analysis of lead and device. Anesthesia: Local and Monitored Anesthesia Care  An independent trained observer pushed medications at my direction. We monitored the patient's level of consciousness and vital signs/physiologic status throughout the procedure duration (see start and stop times below). Level of sedation plan: Moderate sedation (conscious sedation) with intravenous Midazolam four mg and Fentanyl 100 mcg   Start time: 1510  Stop time: 1600  Mallampati airway assessment class: I  ASA class: 1  Estimated blood loss less than 20 cc    Indication of the procedure: Aileen Canales is a 66 y.o. male with symptomatic bradycardia secondary to severe sick sinus syndrome. He underwent dual-chamber pacemaker implantation by me couple of days ago. Unfortunately, his atrial lead got dislodged and hence, he paced from his ventricle consistently. Hence, he is here for atrial lead revision. Details of procedure: The patient was brought to the electrophysiology laboratory in stable condition. The patient was in a fasting and non-sedated state. The risks, benefits and alternatives of the procedure were discussed with the patient. The risks including, but not limited to, the risks of vascular injury, bleeding, infection, device malfunction, lead dislodgement, radiation exposure, injury to cardiac and surrounding structures (including pneumothorax), stroke, myocardial infarction and death were discussed in detail. The patient opted to proceed with the device implantation. Written informed consent was signed and placed in the chart. Prophylactic antibiotic using Ancef 2 g IV was given.      The patient was prepped and draped in sterile fashion. A timeout protocol was completed to identify the patient and the procedure being performed. IV sedation was provided with IV Versed and IV Fentanyl. The patient was monitored continuously with ECG, pulse oximetry, blood pressure monitoring, and direct observation. An incision was made in the left upper pectoral area in a transverse line over the previously placed incision and the pacemaker pocket was reached. Suture sleeve was cut. Atrial lead was removed from the previously placed pacemaker generator. A curved stylette was advanced through the atrial lead and atrial lead was repositioned back to the right atrial appendage, under fluoroscopic guidance. Stress test was performed for the atrial lead and push and pull maneuver was performed in multiple directions to confirm the stability of the atrial lead. After confirming appropriate function and no diaphragmatic stimulation at maximum output, the lead was secured to the underlying tissue using suture. The leads were then connected to the new pulse generator which was then placed into the pocket. Antibiotic solution along with saline flush was used to irrigate the pocket. The pocket was then closed using a 2-0 and 3-0 Vicryl subcutaneous layer and a subcuticular layer using 4-0 Vicryl. The skin was covered with Steri-Strips and dressing. All sponge and needle counts were reported as correct at the end of the procedure. The patient tolerated the procedure well and there were no complications. Patient was transported to the holding area in stable condition. Device and Leads information:    Please see scanned media  Device was programmed to MVP with lower rate of 60 and upper tracking rate of 130.      Impression:    Pre- and post-procedural diagnoses of sick sinus syndrome, status post dual-chamber pacemaker implantation, status post dislodgment of the right atrial lead, status post right atrial lead reposition. Plan:     The patient will be transferred to the floor and have usual post-implant care, including chest x-ray, and interrogation of the device. From an EP perspective, if the interrogation and CXR tomorrow AM are showing appropriate functioning, parameters and placement, the patient may be discharged from the hospital.     Follow-up will be a 1-week wound check with our nurse in the [facility] and a 1-month follow-up with EP NP. Thank you for allowing us to participate in the care of your patient. If you have any questions, please do not hesitate to contact me.     Elly Marsh MD   Cardiac Electrophysiology  [facility]

## 2022-08-05 NOTE — PROGRESS NOTES
[facility] NEPHROLOGY    [facility] [employer]              [phone]                      Interval History and Plan:    Patient seen at bedside. Feel fine  No SOB on room air  BP better after PM placement  Cr same as yesterday  Lytes stable. Wt stable. Urine output 475 documented. ?      Will give 1 liter RL over 10  hours. Check bladder scan  Q Shift and straight cath as needed for urinary retention. Trend vitals and I/O and labs closely  No urgent indication for dialysis but hopefully will be able to avoid it with improving urine output  Monitor closely      D/W patient at bedside. Pt agree. Assessment :     Acute on chronic kidney disease stage IV:  Baseline creatinine has been around 2.4. Creatinine at the time of admission was 2.9. Patient's chronic kidney disease worse thought secondary to underlying diabetes with progressive disease along with Immunotherapy and previous diuretic use  UA: 0- 2 RBC, 3- 4 WBC  Neg protein on UA, UPCR 0.2  US: Negative  Acute decompensation is likely related to relative hypotension      Has a history of AIN from Keytruda    Hypotension. BP better now  Stopped Midodrine due to bradycardia. Now S/P PM placement.      Type 2 diabetes mellitus  Metastatic osteosarcoma  Neuropathy      [address]  [facility] Nephrology would like to thank Grace Marsh MD   for opportunity to serve this patient      Please call with questions at-   24 Hrs Answering service [phone] or  7 am- 5 pm via Perfect serve or cell phone  Charmaine Handy MD          CC/reason for consult :     MILEY/CKD     HPI :     Keagan Zabala is a 66 y.o. 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 Along with worsening kidney function. Patient was discharged with outpatient follow-up he did not make it to any of his outpatient follow-ups. Presented to the hospital again this time with fever. Patient's temperature was running around 120s. Onset of the symptoms several progressively got worse associated with significant weakness and a fall. Nothing was making the symptoms better or worse. At the time of presentation to emergency room he was found to have hypotension with fever along with tachycardia. Was admitted to hospital for further evaluation and management. He was also found to have worsening Creatinine  Presents to the hospital again this time with significant chronic kidney disease with mild worsening of the kidney function. Given the patient's chronic kidney disease with worsening creatinine nephrology is been consulted to assist with care    ROS:       positives in bold   Constitutional:  fever, chills, weakness, weight change, fatigue  Skin:  rash, pruritus, hair loss, bruising, dry skin, petechiae  Head, Face, Neck   headaches, swelling,  cervical adenopathy  Respiratory: shortness of breath, cough, or wheezing  Cardiovascular: chest pain, palpitations, dizzy, edema  Gastrointestinal: nausea, vomiting, diarrhea, constipation,belly pain    Yellow skin, blood in stool  Musculoskeletal:  back pain, muscle weakness, gait problems,       joint pain or swelling. Genitourinary:  dysuria, poor urine flow, flank pain, blood in urine  Neurologic:  vertigo, TIA'S, syncope, seizures, focal weakness  Psychosocial:  insomnia, anxiety, or depression. Additional positive findings:                          All other remaining systems are negative.         PMH/PSH/SH/Family History:     Past Medical History:   Diagnosis Date    Arthritis Not on file           Problem Relation Age of Onset    Emphysema Mother     Other Brother           Medication:      insulin glargine  17 Units SubCUTAneous Daily    insulin lispro  0-16 Units SubCUTAneous TID WC    insulin lispro  0-4 Units SubCUTAneous Nightly    pantoprazole  40 mg Oral QAM AC    dexamethasone  6 mg Oral Daily    sodium chloride flush  5-40 mL IntraVENous 2 times per day    cefTRIAXone (ROCEPHIN) IV  1,000 mg IntraVENous Q24H    saccharomyces boulardii  250 mg Oral BID    baricitinib  1 mg Oral Daily    aspirin  81 mg Oral Daily    atorvastatin  20 mg Oral Daily    sodium chloride flush  5-40 mL IntraVENous 2 times per day    [Held by provider] heparin (porcine)  5,000 Units SubCUTAneous 3 times per day    sodium chloride flush  5-40 mL IntraVENous 2 times per day     sodium chloride flush, sodium chloride, diphenhydrAMINE, melatonin, sodium chloride flush, sodium chloride, ondansetron **OR** ondansetron, polyethylene glycol, acetaminophen **OR** acetaminophen, benzonatate, sodium chloride flush, sodium chloride, glucose, dextrose bolus **OR** dextrose bolus, glucagon (rDNA), dextrose       Vitals :     /71   Pulse 60   Temp 97.9 °F (36.6 °C) (Oral)   Resp 16   Ht 6' (1.829 m)   Wt 214 lb 1.1 oz (97.1 kg)   SpO2 97%   BMI 29.03 kg/m²     I & O :       Intake/Output Summary (Last 24 hours) at 8/5/2022 1341  Last data filed at 8/4/2022 1700  Gross per 24 hour   Intake 240 ml   Output 200 ml   Net 40 ml          Physical Examination :     General appearance: Anxious- no, distressed- no, in good spirits- yes  HEENT: Lips- normal, teeth- ok , oral mucosa- moist  Neck : Mass- no, appears symmetrical, JVD- not visible  Respiratory: Respiratory effort- , wheeze- no, crackles - no  Cardiovascular:  Ausculation- S1S2, Edema mild  Abdomen: visible mass- no, distention- no, scar- no, tenderness- no                            hepatosplenomegaly-  no  Musculoskeletal:  clubbing no,cyanosis- no , digital ischemia- no                           muscle strength- grossly normal , tone - grossly normal  Right BKA   Skin: rashes- no , ulcers- no, induration- no, tightening - no  Psychiatric:  mood stable effect normal   CNS: AAO x 3      LABS:     Recent Labs     08/03/22 0428 08/04/22  0514 08/05/22  0523   WBC 2.4* 3.4* 4.0   HGB 10.5* 10.3* 10.9*   HCT 30.9* 30.0* 31.3*   * 121* 126*       Recent Labs     08/03/22 0428 08/03/22 0429 08/03/22  1314 08/04/22  0514 08/05/22  0523     --   --  142 142   K 4.3  --   --  4.4 4.1     --   --  104 105   CO2 25  --   --  23 26   BUN 60*  --   --  68* 67*   CREATININE 4.2*  --   --  4.0* 4.0*   GLUCOSE 272*  --   --  275* 232*   MG  --  1.80 2.00  --   --    PHOS 4.3  --   --  4.2 4.7

## 2022-08-05 NOTE — PROGRESS NOTES
Progress Note    Admit Date: 7/30/2022  Day: 7  Diet: Diet NPO    CC: Fever, Fatigue    Interval history:   - Pt was seen and examined at bedside this morning; he is afebrile and satting comfortably on room air  - Pt states that cough is getting better and is not bringing up as much phlegm as before. He denies any other pain, nausea, vomiting, and is no longer having diarrhea  - Today, pt is receiving atrial lead revision in the afternoon and EP has spoken with patient about the procedure and does not have any questions about it  - Pt has changed his mind about discharge plans and believes that it would be better to go to SNF to build up strength before going home; he learned recently that his girlfriend has [employer] and does not want to put too much burden on his son  - BUN/Cr remained unchanged from yesterday and nephrology continues to follow and trend labs     HPI:   The patient is a 77-year-old male with past medical history of 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 Heide Augustin [facility], type 2 diabetes mellitus, hyperlipidemia, CKD stage III, HLD who presented to the ED with complaints of a fever. He reports that he checks his temperature daily and decided to come to the ED when he had a temperature of 100.2 today. He also reports feeling weak and had a fall today while shifting himself from the chair to the bed. Review of systems positive for body aches and fever. He denies chills, chest pain, shortness of, abdominal pain, changes in bowel habits, dysuria. Physical exam revealed rhonchorous lungs. Abdomen distended, chronic hernia, nontender. BKA of the left leg. Strikingly, the skin below the right knee extending towards the ankle was markedly warm without any erythema. Patient denies any recent swelling or pain in the leg. Multiple excoriations seen on the skin in the LE.      On arrival to the ED, febrile to 100.6, BP 93/53, reportedly tachycardic, was on room air. Work-up in the ED revealing a slight rise in baseline creatinine. Normal leukocyte count as well as normal hemoglobin levels. UA unremarkable for a UTI. VBG normal.  Chest x-ray did not show any acute abnormality. Patient received sepsis bolus as well as Vancomycin and cefepime in the ED. Patient admitted for concern of an infectious etiology behind the weakness and fever. Blood cultures sent, covid swab done prior to admission. Patient admitted to [facility] for further management.     Medications:     Scheduled Meds:   insulin glargine  17 Units SubCUTAneous Daily    insulin lispro  0-16 Units SubCUTAneous TID WC    insulin lispro  0-4 Units SubCUTAneous Nightly    pantoprazole  40 mg Oral QAM AC    dexamethasone  6 mg Oral Daily    sodium chloride flush  5-40 mL IntraVENous 2 times per day    cefTRIAXone (ROCEPHIN) IV  1,000 mg IntraVENous Q24H    saccharomyces boulardii  250 mg Oral BID    baricitinib  1 mg Oral Daily    aspirin  81 mg Oral Daily    atorvastatin  20 mg Oral Daily    sodium chloride flush  5-40 mL IntraVENous 2 times per day    [Held by provider] heparin (porcine)  5,000 Units SubCUTAneous 3 times per day    sodium chloride flush  5-40 mL IntraVENous 2 times per day     Continuous Infusions:   sodium chloride      sodium chloride 10 mL (08/01/22 2246)    sodium chloride      dextrose       PRN Meds:sodium chloride flush, sodium chloride, diphenhydrAMINE, melatonin, sodium chloride flush, sodium chloride, ondansetron **OR** ondansetron, polyethylene glycol, acetaminophen **OR** acetaminophen, benzonatate, sodium chloride flush, sodium chloride, glucose, dextrose bolus **OR** dextrose bolus, glucagon (rDNA), dextrose    Objective:   Vitals:   T-max:  Patient Vitals for the past 8 hrs:   BP Temp Temp src Pulse Resp SpO2 Weight   08/05/22 0833 (!) 144/78 97.6 °F (36.4 °C) Oral 61 16 98 % --   08/05/22 0347 123/69 98 °F (36.7 °C) Oral 60 16 94 % 214 lb 1.1 oz (97.1 kg)       Intake/Output Summary (Last 24 hours) at 8/5/2022 1042  Last data filed at 8/4/2022 1700  Gross per 24 hour   Intake 720 ml   Output 475 ml   Net 245 ml       Review of Systems   Constitutional: Negative. Negative for activity change. Respiratory:  Positive for cough. Negative for shortness of breath. Cardiovascular:  Negative for chest pain and palpitations. Gastrointestinal:  Negative for constipation, diarrhea and nausea. Musculoskeletal: Negative. Psychiatric/Behavioral: Negative. Physical Exam  Constitutional:       Appearance: Normal appearance. Eyes:      Extraocular Movements: Extraocular movements intact. Cardiovascular:      Rate and Rhythm: Regular rhythm. Bradycardia present. Pulses: Normal pulses. Heart sounds: Normal heart sounds. Pulmonary:      Effort: Pulmonary effort is normal.      Breath sounds: Normal breath sounds. Abdominal:      General: Abdomen is flat. Palpations: Abdomen is soft. Musculoskeletal:      Comments: L BKA   Neurological:      General: No focal deficit present. Mental Status: He is alert.        LABS:    CBC:   Recent Labs     08/03/22 0428 08/04/22 0514 08/05/22  0523   WBC 2.4* 3.4* 4.0   HGB 10.5* 10.3* 10.9*   HCT 30.9* 30.0* 31.3*   * 121* 126*   .1* 99.9 99.4     Renal:    Recent Labs     08/03/22 0428 08/03/22  0429 08/03/22  1314 08/04/22  0514 08/05/22  0523     --   --  142 142   K 4.3  --   --  4.4 4.1     --   --  104 105   CO2 25  --   --  23 26   BUN 60*  --   --  68* 67*   CREATININE 4.2*  --   --  4.0* 4.0*   GLUCOSE 272*  --   --  275* 232*   CALCIUM 8.0*  --   --  8.3 8.3   MG  --  1.80 2.00  --   --    PHOS 4.3  --   --  4.2 4.7   ANIONGAP 12  --   --  15 11     Hepatic:   Recent Labs     08/03/22 0428 08/04/22  0514 08/05/22  0523   AST 43* 36 25   ALT 27 27 20   BILITOT 0.4 0.4 0.4   PROT 5.0* 5.1* 5.0*   LABALBU 3.3* 3.3* 3.3*   ALKPHOS 51 50 51 Troponin: No results for input(s): TROPONINI in the last 72 hours. BNP: No results for input(s): BNP in the last 72 hours. Lipids:   Recent Labs     08/03/22  0429   CHOL 88   HDL 30*     ABGs:  No results for input(s): PHART, HYJ5WTX, PO2ART, WGG4JDO, BEART, THGBART, T6ZPALJG, AOZ4HUW in the last 72 hours. INR:   Recent Labs     08/05/22  0523   INR 1.07     Lactate: No results for input(s): LACTATE in the last 72 hours. Cultures:  -----------------------------------------------------------------  RAD:   XR CHEST PORTABLE   Final Result      Mild bilateral interstitial opacities and bibasilar atelectasis, increased compared to prior. US RENAL COMPLETE   Final Result      Negative renal ultrasound. VL Extremity Venous Right   Final Result      CT CHEST WO CONTRAST   Final Result      Bilateral patchy parenchymal infiltrates are seen throughout both lungs, left greater than right. The appearance is suggestive of atypical pneumonia. Recommend follow-up imaging to verify resolution and exclude underlying nodularity. Coronary calcification. Thyromegaly with calcifications noted in the lower neck. XR CHEST PORTABLE   Final Result   1. No acute disease. 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 Wilfred Ibarra [facility], type 2 diabetes mellitus, hyperlipidemia, CKD stage III, HLD who presented to the ED with complaints of a fever. Sepsis 2/2 suspected COVID PNA vs cellulitis  Patient met SIRS criterion on arrival. Febrile and hypotensive, lactic acid normal.  Patient with symptoms of cough, weakness, body aches, concerning for a suspected respiratory infection.  Given negative CXR findings, appears the COVID infection is confined to URT.  - S/p sepsis bolus in ED  - Bcx no growth to date;

## 2022-08-05 NOTE — PROGRESS NOTES
Electrophysiology - PROGRESS NOTE    Admit Date: 7/30/2022     Chief Complaint: SSS     Interval History:   Patient seen and examined and notes reviewed. Patient is being followed for SSS. Patient admitted to the hospital after suffering a fall at home. He was noted to be febrile, hypotensive, tachycardic and tested positive for COVID. He was noted to have severe bradycardia and hypotension that was not responding to fluids. Telemetry revealed severe sinus bradycardia with intermittent junctional ventricular escape with the longest sinus pause lasting about 9 seconds. He underwent dual-chamber [employer] pacemaker 8/3/22 with Dr. Lisa Minor. Device check yesterday showed atrial lead dislodgement, plan for revision this afternoon with Dr. Lisa Minor.     In: [address] [P[address].:960]  Out: 475    Wt Readings from Last 2 Encounters:   08/05/22 214 lb 1.1 oz (97.1 kg)   06/24/22 201 lb 11.5 oz (91.5 kg)         Data:   Scheduled Meds:   Scheduled Meds:   insulin glargine  17 Units SubCUTAneous Daily    insulin lispro  0-16 Units SubCUTAneous TID WC    insulin lispro  0-4 Units SubCUTAneous Nightly    pantoprazole  40 mg Oral QAM AC    azithromycin  500 mg Oral Daily    dexamethasone  6 mg Oral Daily    sodium chloride flush  5-40 mL IntraVENous 2 times per day    cefTRIAXone (ROCEPHIN) IV  1,000 mg IntraVENous Q24H    saccharomyces boulardii  250 mg Oral BID    baricitinib  1 mg Oral Daily    aspirin  81 mg Oral Daily    atorvastatin  20 mg Oral Daily    sodium chloride flush  5-40 mL IntraVENous 2 times per day    [Held by provider] heparin (porcine)  5,000 Units SubCUTAneous 3 times per day    sodium chloride flush  5-40 mL IntraVENous 2 times per day     Continuous Infusions:   sodium chloride      sodium chloride 10 mL (08/01/22 6338)    sodium chloride      dextrose       PRN Meds:.sodium chloride flush, sodium chloride, diphenhydrAMINE, melatonin, sodium chloride flush, sodium chloride, ondansetron **OR** ondansetron, polyethylene glycol, acetaminophen **OR** acetaminophen, benzonatate, sodium chloride flush, sodium chloride, glucose, dextrose bolus **OR** dextrose bolus, glucagon (rDNA), dextrose  Continuous Infusions:   sodium chloride      sodium chloride 10 mL (08/01/22 2246)    sodium chloride      dextrose         Intake/Output Summary (Last 24 hours) at 8/5/2022 0843  Last data filed at 8/4/2022 1700  Gross per 24 hour   Intake 960 ml   Output 475 ml   Net 485 ml       CBC:   Lab Results   Component Value Date/Time    WBC 4.0 08/05/2022 05:23 AM    HGB 10.9 08/05/2022 05:23 AM     08/05/2022 05:23 AM     BMP:  Lab Results   Component Value Date/Time     08/05/2022 05:23 AM    K 4.1 08/05/2022 05:23 AM    K 4.1 08/02/2022 04:55 AM     08/05/2022 05:23 AM    CO2 26 08/05/2022 05:23 AM    BUN 67 08/05/2022 05:23 AM    CREATININE 4.0 08/05/2022 05:23 AM    GLUCOSE 232 08/05/2022 05:23 AM    GLUCOSE 302 07/07/2017 02:59 PM     INR:   Lab Results   Component Value Date/Time    INR 1.07 08/05/2022 05:23 AM    INR 1.23 08/01/2022 08:10 PM    INR 1.18 08/07/2019 11:32 AM        CARDIAC LABS  ENZYMES:No results for input(s): CKMB, CKMBINDEX, TROPONINI in the last 72 hours.     Invalid input(s): CKTOTAL;3  FASTING LIPID PANEL:  Lab Results   Component Value Date/Time    HDL 30 08/03/2022 04:29 AM    LDLCALC 29 08/03/2022 04:29 AM    TRIG 147 08/03/2022 04:29 AM    TSH 0.411 07/07/2017 02:59 PM     LIVER PROFILE:  Lab Results   Component Value Date/Time    AST 25 08/05/2022 05:23 AM    AST 36 08/04/2022 05:14 AM    ALT 20 08/05/2022 05:23 AM    ALT 27 08/04/2022 05:14 AM       -----------------------------------------------------------------  Telemetry: Personally reviewed  sinus, occ     Objective:   Vitals: BP (!) 144/78   Pulse 61   Temp 97.6 °F (36.4 °C) (Oral)   Resp 16   Ht 6' (1.829 m)   Wt 214 lb 1.1 oz (97.1 kg)   SpO2 98%   BMI 29.03 kg/m²   General appearance: alert, appears stated age and cooperative, No acute distress   Skin: Skin color, texture, turgor normal. No rashes or ecchymosis. Neck: no JVD, supple, symmetrical, trachea midline   Lungs: , no accessory muscle use, no respiratory distress  Heart: RRR  Extremities: No edema, DP +  Psychiatric: normal insight and affect    Patient Active Problem List:     Chondroblastic osteosarcoma ([address]     Micturition syncope     Fever and chills     Diabetes (HCC)     Left BKA     Port-A-Cath in place     Cellulitis     Stage 3b chronic kidney disease (HCC)     Sepsis ([address]     SSS (sick sinus syndrome) ([facility])     Bradycardia     Septicemia ([facility])        Assessment & Plan:    1. COVID positive  2. SSS  3.pacemaker  4.osteosarcoma    Minerva Nair Is a 66 y.o. 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 Calos Wilson)    SSS  - s/p dual [address] MDT PPM (8/3/2022, Dr. Calos Wilson)  - Left chest incision with small amount bloody drainage noted  - Device check shows atrial lead dislodged  - Plan for atrial lead revision this afternoon  - Npo at midnight  - D/w pt and he is aware of plan, primary nurse made aware of plan  - Reviewed post procedure instructions with patient    Electronically signed by Sowmya Cota 1822    I spent a total of 40 minutes and greater than 50% of the time was spent counseling with Minerva Nair and coordinating care regarding her diagnosis, treatments and plan of care.

## 2022-08-06 ENCOUNTER — NURSE ONLY (OUTPATIENT)
Dept: CARDIOLOGY CLINIC | Age: 72
End: 2022-08-06
Payer: OTHER GOVERNMENT

## 2022-08-06 DIAGNOSIS — Z95.0 PACEMAKER: Primary | ICD-10-CM

## 2022-08-06 DIAGNOSIS — R00.1 BRADYCARDIA: ICD-10-CM

## 2022-08-06 DIAGNOSIS — I49.5 SSS (SICK SINUS SYNDROME) (HCC): ICD-10-CM

## 2022-08-06 LAB
A/G RATIO: 1.7 (ref 1.1–2.2)
ALBUMIN SERPL-MCNC: 3.3 G/DL (ref 3.4–5)
ALP BLD-CCNC: 55 U/L (ref 40–129)
ALT SERPL-CCNC: 15 U/L (ref 10–40)
ANION GAP SERPL CALCULATED.3IONS-SCNC: 11 MMOL/L (ref 3–16)
AST SERPL-CCNC: 20 U/L (ref 15–37)
BASOPHILS ABSOLUTE: 0 K/UL (ref 0–0.2)
BASOPHILS RELATIVE PERCENT: 0.5 %
BILIRUB SERPL-MCNC: 0.4 MG/DL (ref 0–1)
BUN BLDV-MCNC: 66 MG/DL (ref 7–20)
CALCIUM SERPL-MCNC: 8.4 MG/DL (ref 8.3–10.6)
CHLORIDE BLD-SCNC: 105 MMOL/L (ref 99–110)
CO2: 26 MMOL/L (ref 21–32)
CREAT SERPL-MCNC: 3.6 MG/DL (ref 0.8–1.3)
EOSINOPHILS ABSOLUTE: 0.1 K/UL (ref 0–0.6)
EOSINOPHILS RELATIVE PERCENT: 1.2 %
ESTIMATED AVERAGE GLUCOSE: 162.8 MG/DL
GFR AFRICAN AMERICAN: 20
GFR NON-AFRICAN AMERICAN: 17
GLUCOSE BLD-MCNC: 153 MG/DL (ref 70–99)
GLUCOSE BLD-MCNC: 160 MG/DL (ref 70–99)
GLUCOSE BLD-MCNC: 188 MG/DL (ref 70–99)
GLUCOSE BLD-MCNC: 200 MG/DL (ref 70–99)
GLUCOSE BLD-MCNC: 216 MG/DL (ref 70–99)
HBA1C MFR BLD: 7.3 %
HCT VFR BLD CALC: 33.5 % (ref 40.5–52.5)
HEMOGLOBIN: 11.7 G/DL (ref 13.5–17.5)
LYMPHOCYTES ABSOLUTE: 0.3 K/UL (ref 1–5.1)
LYMPHOCYTES RELATIVE PERCENT: 5.7 %
MCH RBC QN AUTO: 34.9 PG (ref 26–34)
MCHC RBC AUTO-ENTMCNC: 35 G/DL (ref 31–36)
MCV RBC AUTO: 99.8 FL (ref 80–100)
MONOCYTES ABSOLUTE: 0.6 K/UL (ref 0–1.3)
MONOCYTES RELATIVE PERCENT: 10.6 %
NEUTROPHILS ABSOLUTE: 4.8 K/UL (ref 1.7–7.7)
NEUTROPHILS RELATIVE PERCENT: 82 %
PDW BLD-RTO: 13 % (ref 12.4–15.4)
PERFORMED ON: ABNORMAL
PHOSPHORUS: 4.1 MG/DL (ref 2.5–4.9)
PLATELET # BLD: 150 K/UL (ref 135–450)
PMV BLD AUTO: 8.5 FL (ref 5–10.5)
POTASSIUM SERPL-SCNC: 4.5 MMOL/L (ref 3.5–5.1)
RBC # BLD: 3.35 M/UL (ref 4.2–5.9)
SODIUM BLD-SCNC: 142 MMOL/L (ref 136–145)
TOTAL PROTEIN: 5.2 G/DL (ref 6.4–8.2)
WBC # BLD: 5.9 K/UL (ref 4–11)

## 2022-08-06 PROCEDURE — 2580000003 HC RX 258: Performed by: INTERNAL MEDICINE

## 2022-08-06 PROCEDURE — 6360000002 HC RX W HCPCS

## 2022-08-06 PROCEDURE — 6360000002 HC RX W HCPCS: Performed by: INTERNAL MEDICINE

## 2022-08-06 PROCEDURE — 84100 ASSAY OF PHOSPHORUS: CPT

## 2022-08-06 PROCEDURE — 6370000000 HC RX 637 (ALT 250 FOR IP)

## 2022-08-06 PROCEDURE — 80053 COMPREHEN METABOLIC PANEL: CPT

## 2022-08-06 PROCEDURE — 2060000000 HC ICU INTERMEDIATE R&B

## 2022-08-06 PROCEDURE — 99233 SBSQ HOSP IP/OBS HIGH 50: CPT | Performed by: NURSE PRACTITIONER

## 2022-08-06 PROCEDURE — 36415 COLL VENOUS BLD VENIPUNCTURE: CPT

## 2022-08-06 PROCEDURE — 85025 COMPLETE CBC W/AUTO DIFF WBC: CPT

## 2022-08-06 PROCEDURE — 93280 PM DEVICE PROGR EVAL DUAL: CPT | Performed by: INTERNAL MEDICINE

## 2022-08-06 PROCEDURE — 6370000000 HC RX 637 (ALT 250 FOR IP): Performed by: INTERNAL MEDICINE

## 2022-08-06 RX ORDER — SODIUM CHLORIDE, SODIUM LACTATE, POTASSIUM CHLORIDE, CALCIUM CHLORIDE 600; 310; 30; 20 MG/100ML; MG/100ML; MG/100ML; MG/100ML
INJECTION, SOLUTION INTRAVENOUS CONTINUOUS
Status: DISCONTINUED | OUTPATIENT
Start: 2022-08-06 | End: 2022-08-06

## 2022-08-06 RX ADMIN — DEXAMETHASONE 6 MG: 4 TABLET ORAL at 09:24

## 2022-08-06 RX ADMIN — Medication 250 MG: at 21:50

## 2022-08-06 RX ADMIN — ASPIRIN 81 MG: 81 TABLET, COATED ORAL at 09:25

## 2022-08-06 RX ADMIN — SODIUM CHLORIDE, PRESERVATIVE FREE 10 ML: 5 INJECTION INTRAVENOUS at 09:26

## 2022-08-06 RX ADMIN — HEPARIN SODIUM 5000 UNITS: 5000 INJECTION INTRAVENOUS; SUBCUTANEOUS at 21:49

## 2022-08-06 RX ADMIN — PANTOPRAZOLE SODIUM 40 MG: 40 TABLET, DELAYED RELEASE ORAL at 05:30

## 2022-08-06 RX ADMIN — SODIUM CHLORIDE, PRESERVATIVE FREE 10 ML: 5 INJECTION INTRAVENOUS at 21:51

## 2022-08-06 RX ADMIN — Medication 250 MG: at 09:25

## 2022-08-06 RX ADMIN — CEFTRIAXONE 1000 MG: 1 INJECTION, POWDER, FOR SOLUTION INTRAMUSCULAR; INTRAVENOUS at 09:32

## 2022-08-06 RX ADMIN — ATORVASTATIN CALCIUM 20 MG: 20 TABLET, FILM COATED ORAL at 09:25

## 2022-08-06 RX ADMIN — INSULIN LISPRO 4 UNITS: 100 INJECTION, SOLUTION INTRAVENOUS; SUBCUTANEOUS at 11:41

## 2022-08-06 RX ADMIN — DIPHENHYDRAMINE HYDROCHLORIDE 25 MG: 25 TABLET ORAL at 22:19

## 2022-08-06 RX ADMIN — BARICITINIB 1 MG: 2 TABLET, FILM COATED ORAL at 09:25

## 2022-08-06 ASSESSMENT — ENCOUNTER SYMPTOMS
WHEEZING: 0
DIARRHEA: 0
COUGH: 0
SHORTNESS OF BREATH: 0
CONSTIPATION: 0
CHEST TIGHTNESS: 0

## 2022-08-06 ASSESSMENT — PAIN SCALES - GENERAL
PAINLEVEL_OUTOF10: 0
PAINLEVEL_OUTOF10: 0

## 2022-08-06 NOTE — DISCHARGE SUMMARY
INTERNAL MEDICINE DEPARTMENT AT [address] [place] [address]  DISCHARGE SUMMARY    Patient ID: Yoko Lockhart                                             Discharge Date: 8/6/2022   Patient's PCP: Alice Sebastian [facility]                                          Discharge Physician: Azul Beckwith DO MD  Admit Date: 7/30/2022   Admitting Physician: Lyudmila Prince MD    PROBLEMS DURING HOSPITALIZATION:  Present on Admission:   Sepsis ([address]   Fever and chills   SSS (sick sinus syndrome) ([address]   Bradycardia   Septicemia ([address]   COVID-19   Pacemaker      DISCHARGE DIAGNOSES:    HPI:  The patient is a 68-year-old male with past medical history of 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 Rica Claude [facility], type 2 diabetes mellitus, hyperlipidemia, CKD stage III, HLD who presented to the ED with complaints of a fever. He reports that he checks his temperature daily and decided to come to the ED when he had a temperature of 100.2 today. He also reports feeling weak and had a fall today while shifting himself from the chair to the bed. Review of systems positive for body aches and fever. He denies chills, chest pain, shortness of, abdominal pain, changes in bowel habits, dysuria. Physical exam revealed rhonchorous lungs. Abdomen distended, chronic hernia, nontender. BKA of the left leg. Strikingly, the skin below the right knee extending towards the ankle was markedly warm without any erythema. Patient denies any recent swelling or pain in the leg. Multiple excoriations seen on the skin in the LE. On arrival to the ED, febrile to 100.6, BP 93/53, reportedly tachycardic, was on room air. Work-up in the ED revealing a slight rise in baseline creatinine. Normal leukocyte count as well as normal hemoglobin levels. UA unremarkable for a UTI. VBG normal.  Chest x-ray did not show any acute abnormality.   Patient received sepsis bolus as well as Vancomycin and cefepime in the ED. Patient admitted for concern of an infectious etiology behind the weakness and fever. Blood cultures sent, covid swab done prior to admission. Patient admitted to [facility] for further management. The following issues were addressed during hospitalization:    Sepsis 2/2 suspected COVID PNA  - Patient came into the ED on 7/30 after having fallen and having body aches and a fever. Pt met SIRS criteria and came in with cough, weakness, body aches, and a concern for a suspected respiratory infection. CXR findings came back negative but Chest CT came back with concerns for atypical pneumonia. He was started on rocephin and azithromycin as well as decadron for his shortness of breath. Upon arrival to the ED, pt was satting comfortably on 3L O2 and has now been weaned off of it and is comfortable on room air. Pt CRP levels returned above 75 so he was placed on Barticinib. 8/6: Pt no longer complains of cough or bringing up sputum and does not have any shortness of breath or body aches. Sick Sinus Syndrome  - Upon arrival patient was noted to have severe bradycardia, hypotension, which was not responding to fluids. Tele revealed sinus obed with intermittent junctional ventricular escape and sinus pauses. He underwent dual-chamber pacemaker placement and then needed an atrial lead revision due to dislodgement. He is not complaining of any chest pain, dizziness, or palpitations. Pt was placed in a left arm sling. Patient has been instructed to follow up with EP in a week. CKD IV  - Pt came in with a Cr level of 2.9 that has since elvated and peaked at 4.2 during his stay at the hospital. Cr level has come back down to 3.6 and was hypothesized that this was dude to SSS and lack of perfusion to the kidneys. Continuing to monitor his Cr levels post pacemaker procedure to see if levels fall back to his baseline. Nephrology is on board and following his labs.          Physical Exam:  Physical Exam  Eyes:      Extraocular Movements: Extraocular movements intact. Cardiovascular:      Rate and Rhythm: Normal rate and regular rhythm. Pulmonary:      Effort: Pulmonary effort is normal.      Breath sounds: Normal breath sounds. Abdominal:      General: Abdomen is flat. Palpations: Abdomen is soft. Neurological:      General: No focal deficit present. Mental Status: He is alert.    Psychiatric:         Mood and Affect: Mood normal.        Consults: cardiology, ID, nephrology, and hematology/oncology  Significant Diagnostic Studies:  chest x-ray  Treatments: antibiotics: ceftriaxone and azithromycin and steroids: solu-medrol  Disposition: SNF  Discharged Condition: Stable  Follow Up: Primary Care Physician in two weeks    DISCHARGE MEDICATION:       Medication List        ASK your doctor about these medications      aspirin 81 MG tablet     atorvastatin 20 MG tablet  Commonly known as: LIPITOR     insulin glargine 100 UNIT/ML injection pen  Commonly known as: LANTUS;BASAGLAR     melatonin 10 MG Caps capsule     pregabalin 150 MG capsule  Commonly known as: LYRICA     Tylenol PM Extra Strength  MG tablet  Generic drug: diphenhydrAMINE-APAP (sleep)     vitamin D 1000 UNIT Tabs tablet  Commonly known as: CHOLECALCIFEROL               Activity: activity as tolerated  Diet: regular diet  Wound Care: none needed    Time Spent on discharge is more than 20 minutes    Signed:  Cholo Cole DO, MD, PGY-1  8/8/2022

## 2022-08-06 NOTE — PROGRESS NOTES
Electrophysiology - PROGRESS NOTE    Admit Date: 7/30/2022     Chief Complaint: SSS     Interval History:   Patient seen and examined and notes reviewed. Patient is being followed for SSS. Patient admitted to the hospital after suffering a fall at home. He was noted to be febrile, hypotensive, tachycardic and tested positive for COVID. He was noted to have severe bradycardia and hypotension that was not responding to fluids. Telemetry revealed severe sinus bradycardia with intermittent junctional ventricular escape with the longest sinus pause lasting about 9 seconds. He underwent dual-chamber [employer] pacemaker 8/3/22 with Dr. Romy Yen. Device check 8/4/22 showed atrial lead dislodgement and pt underwent atrial lead revision yesterday with Dr. Romy Yen. No c/o CP, SOB, dizziness, LH, palps. BP slightly elevated overnight. In: 1407.5 [P.O.:720;  I.V.:687.5]  Out: 1125    Wt Readings from Last 2 Encounters:   08/06/22 190 lb 4.1 oz (86.3 kg)   06/24/22 201 lb 11.5 oz (91.5 kg)         Data:   Scheduled Meds:   Scheduled Meds:   sodium chloride flush  5-40 mL IntraVENous 2 times per day    sodium chloride flush  5-40 mL IntraVENous 2 times per day    insulin glargine  17 Units SubCUTAneous Daily    insulin lispro  0-16 Units SubCUTAneous TID WC    insulin lispro  0-4 Units SubCUTAneous Nightly    pantoprazole  40 mg Oral QAM AC    dexamethasone  6 mg Oral Daily    sodium chloride flush  5-40 mL IntraVENous 2 times per day    cefTRIAXone (ROCEPHIN) IV  1,000 mg IntraVENous Q24H    saccharomyces boulardii  250 mg Oral BID    baricitinib  1 mg Oral Daily    aspirin  81 mg Oral Daily    atorvastatin  20 mg Oral Daily    sodium chloride flush  5-40 mL IntraVENous 2 times per day    [Held by provider] heparin (porcine)  5,000 Units SubCUTAneous 3 times per day    sodium chloride flush  5-40 mL IntraVENous 2 times per day     Continuous Infusions:   lactated ringers sodium chloride      sodium chloride      sodium chloride 10 mL (08/01/22 2246)    sodium chloride      dextrose       PRN Meds:.sodium chloride flush, sodium chloride, sodium chloride flush, sodium chloride, diphenhydrAMINE, melatonin, sodium chloride flush, sodium chloride, ondansetron **OR** ondansetron, polyethylene glycol, acetaminophen **OR** acetaminophen, benzonatate, sodium chloride flush, sodium chloride, glucose, dextrose bolus **OR** dextrose bolus, glucagon (rDNA), dextrose  Continuous Infusions:   lactated ringers      sodium chloride      sodium chloride      sodium chloride 10 mL (08/01/22 2246)    sodium chloride      dextrose         Intake/Output Summary (Last 24 hours) at 8/6/2022 0758  Last data filed at 8/5/2022 2355  Gross per 24 hour   Intake 1407.54 ml   Output 1125 ml   Net 282.54 ml       CBC:   Lab Results   Component Value Date/Time    WBC 5.9 08/06/2022 05:14 AM    HGB 11.7 08/06/2022 05:14 AM     08/06/2022 05:14 AM     BMP:  Lab Results   Component Value Date/Time     08/06/2022 05:14 AM    K 4.5 08/06/2022 05:14 AM    K 4.1 08/02/2022 04:55 AM     08/06/2022 05:14 AM    CO2 26 08/06/2022 05:14 AM    BUN 66 08/06/2022 05:14 AM    CREATININE 3.6 08/06/2022 05:14 AM    GLUCOSE 200 08/06/2022 05:14 AM    GLUCOSE 302 07/07/2017 02:59 PM     INR:   Lab Results   Component Value Date/Time    INR 1.07 08/05/2022 05:23 AM    INR 1.23 08/01/2022 08:10 PM    INR 1.18 08/07/2019 11:32 AM        CARDIAC LABS  ENZYMES:No results for input(s): CKMB, CKMBINDEX, TROPONINI in the last 72 hours.     Invalid input(s): CKTOTAL;3  FASTING LIPID PANEL:  Lab Results   Component Value Date/Time    HDL 30 08/03/2022 04:29 AM    LDLCALC 29 08/03/2022 04:29 AM    TRIG 147 08/03/2022 04:29 AM    TSH 0.411 07/07/2017 02:59 PM     LIVER PROFILE:  Lab Results   Component Value Date/Time    AST 20 08/06/2022 05:14 AM    AST 25 08/05/2022 05:23 AM    ALT 15 08/06/2022 05:14 AM    ALT 20 08/05/2022 05:23 AM       -----------------------------------------------------------------  Telemetry: Personally reviewed  AP    Objective:   Vitals: BP (!) 152/85   Pulse 60   Temp 97.9 °F (36.6 °C) (Oral)   Resp 16   Ht 6' (1.829 m)   Wt 190 lb 4.1 oz (86.3 kg)   SpO2 99%   BMI 25.80 kg/m²   General appearance: alert, appears stated age and cooperative, No acute distress   Skin: Skin color, texture, turgor normal. No rashes or ecchymosis. L chest incision CDI  Neck: no JVD, supple, symmetrical, trachea midline   Lungs: , no accessory muscle use, no respiratory distress  Heart: RRR  Extremities: No edema, DP +  Psychiatric: normal insight and affect    Patient Active Problem List:     Chondroblastic osteosarcoma ([address]     Micturition syncope     Fever and chills     Diabetes (HCC)     Left BKA     Port-A-Cath in place     Cellulitis     Stage 3b chronic kidney disease ([address]     Sepsis ([address]     SSS (sick sinus syndrome) (HCC)     Bradycardia     Septicemia ([address]        Assessment & Plan:    1. COVID positive  2. SSS  3.pacemaker  4.osteosarcoma    Brandon Penn Is a 66 y.o. 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 Alysa Riley)    SSS  - s/p dual 509 [address] MDT PPM (8/3/2022, Dr. Alysa Riley)  - Left chest incision CDI  - CXR shows appropriate placement of leads, does show suspicion for small loculated pneumothorax at right costophrenic angle- reviewed CXR with Dr. Alysa Riley who does not feel this is related to PPM d/t location and that it is loculated, no further workup per cardiology, will defer to primary team, Dr. Radha Ho updated  - Device check shows appropriate function of leads  - Reviewed post procedure instructions at length with patient, acknowledges understanding  - Follow up appts [facility] - [id number] [address] for DC per EP standpoint    Electronically signed by Sowmya Hicks [id number]    I spent a total of 40 minutes and greater than 50% of the time was spent counseling with Keagan Zabala and coordinating care regarding her diagnosis, treatments and plan of care.

## 2022-08-06 NOTE — PROGRESS NOTES
Progress Note    Admit Date: 7/30/2022  Day: 8  Diet: ADULT DIET; Regular    CC: Fever, Fatigue    Interval history:   - Pt was seen and examined at bedside this morning; he is afebrile and satting comfortably on room air  - Pt had atrial lead revision yesterday and is currently in a left arm sling; EP has seen him today and confirmed that he is cleared for discharge on their end  - Pt Cr is trending down to 3.6 from 4.0 yesterday; he states that his cough is no longer bothering him and is not bringing up anymore phlegm  - [facility] [place] and they are working on approval   - LR has been turned off due to patient blood pressure becoming more hypertensive    HPI:   The patient is a 38-year-old male with past medical history of 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 Emmanuel [facility], type 2 diabetes mellitus, hyperlipidemia, CKD stage III, HLD who presented to the ED with complaints of a fever. He reports that he checks his temperature daily and decided to come to the ED when he had a temperature of 100.2 today. He also reports feeling weak and had a fall today while shifting himself from the chair to the bed. Review of systems positive for body aches and fever. He denies chills, chest pain, shortness of, abdominal pain, changes in bowel habits, dysuria. Physical exam revealed rhonchorous lungs. Abdomen distended, chronic hernia, nontender. BKA of the left leg. Strikingly, the skin below the right knee extending towards the ankle was markedly warm without any erythema. Patient denies any recent swelling or pain in the leg. Multiple excoriations seen on the skin in the LE. On arrival to the ED, febrile to 100.6, BP 93/53, reportedly tachycardic, was on room air. Work-up in the ED revealing a slight rise in baseline creatinine. Normal leukocyte count as well as normal hemoglobin levels.   UA unremarkable for a UTI. VBG normal.  Chest x-ray did not show any acute abnormality. Patient received sepsis bolus as well as Vancomycin and cefepime in the ED. Patient admitted for concern of an infectious etiology behind the weakness and fever. Blood cultures sent, covid swab done prior to admission. Patient admitted to [facility] for further management.     Medications:     Scheduled Meds:   sodium chloride flush  5-40 mL IntraVENous 2 times per day    sodium chloride flush  5-40 mL IntraVENous 2 times per day    insulin glargine  17 Units SubCUTAneous Daily    insulin lispro  0-16 Units SubCUTAneous TID WC    insulin lispro  0-4 Units SubCUTAneous Nightly    pantoprazole  40 mg Oral QAM AC    dexamethasone  6 mg Oral Daily    sodium chloride flush  5-40 mL IntraVENous 2 times per day    cefTRIAXone (ROCEPHIN) IV  1,000 mg IntraVENous Q24H    saccharomyces boulardii  250 mg Oral BID    baricitinib  1 mg Oral Daily    aspirin  81 mg Oral Daily    atorvastatin  20 mg Oral Daily    sodium chloride flush  5-40 mL IntraVENous 2 times per day    [Held by provider] heparin (porcine)  5,000 Units SubCUTAneous 3 times per day    sodium chloride flush  5-40 mL IntraVENous 2 times per day     Continuous Infusions:   lactated ringers      sodium chloride      sodium chloride      sodium chloride 10 mL (08/01/22 2246)    sodium chloride      dextrose       PRN Meds:sodium chloride flush, sodium chloride, sodium chloride flush, sodium chloride, diphenhydrAMINE, melatonin, sodium chloride flush, sodium chloride, ondansetron **OR** ondansetron, polyethylene glycol, acetaminophen **OR** acetaminophen, benzonatate, sodium chloride flush, sodium chloride, glucose, dextrose bolus **OR** dextrose bolus, glucagon (rDNA), dextrose    Objective:   Vitals:   T-max:  Patient Vitals for the past 8 hrs:   BP Temp Temp src Pulse Resp SpO2 Weight   08/06/22 0600 -- -- -- 60 -- -- --   08/06/22 0522 (!) 152/85 97.9 °F (36.6 °C) Oral 60 16 99 % 190 lb 4.1 oz (86.3 kg)   08/06/22 0400 -- -- -- 60 -- -- --   08/06/22 0200 -- -- -- 60 -- -- --       Intake/Output Summary (Last 24 hours) at 8/6/2022 0923  Last data filed at 8/5/2022 2355  Gross per 24 hour   Intake 1407.54 ml   Output 925 ml   Net 482.54 ml       Review of Systems   Constitutional: Negative. Respiratory:  Negative for cough, chest tightness, shortness of breath and wheezing. Cardiovascular:  Negative for chest pain and palpitations. Gastrointestinal:  Negative for constipation and diarrhea. Genitourinary:  Negative for difficulty urinating. Psychiatric/Behavioral: Negative. Physical Exam  Eyes:      Extraocular Movements: Extraocular movements intact. Cardiovascular:      Rate and Rhythm: Normal rate and regular rhythm. Pulses: Normal pulses. Heart sounds: Normal heart sounds. No murmur heard. No gallop. Pulmonary:      Effort: Pulmonary effort is normal.      Breath sounds: Normal breath sounds. Abdominal:      General: Abdomen is flat. Palpations: Abdomen is soft. Neurological:      General: No focal deficit present. Mental Status: He is alert.    Psychiatric:         Mood and Affect: Mood normal.       LABS:    CBC:   Recent Labs     08/04/22 0514 08/05/22 0523 08/06/22 0514   WBC 3.4* 4.0 5.9   HGB 10.3* 10.9* 11.7*   HCT 30.0* 31.3* 33.5*   * 126* 150   MCV 99.9 99.4 99.8     Renal:    Recent Labs     08/03/22  1314 08/04/22 0514 08/05/22 0523 08/06/22 0514   NA  --  142 142 142   K  --  4.4 4.1 4.5   CL  --  104 105 105   CO2  --  23 26 26   BUN  --  68* 67* 66*   CREATININE  --  4.0* 4.0* 3.6*   GLUCOSE  --  275* 232* 200*   CALCIUM  --  8.3 8.3 8.4   MG 2.00  --   --   --    PHOS  --  4.2 4.7 4.1   ANIONGAP  --  15 11 11     Hepatic:   Recent Labs     08/04/22  0514 08/05/22 0523 08/06/22 0514   AST 36 25 20   ALT 27 20 15   BILITOT 0.4 0.4 0.4   PROT 5.1* 5.0* 5.2*   LABALBU 3.3* 3.3* 3.3*   ALKPHOS 50 51 55     Troponin: No results for input(s): TROPONINI in the last 72 hours. BNP: No results for input(s): BNP in the last 72 hours. Lipids: No results for input(s): CHOL, HDL in the last 72 hours. Invalid input(s): LDLCALCU, TRIGLYCERIDE  ABGs:  No results for input(s): PHART, MAK6XIL, PO2ART, KHF0FFS, BEART, THGBART, F6LPLBJE, AZS2JYQ in the last 72 hours. INR:   Recent Labs     08/05/22  0523   INR 1.07     Lactate: No results for input(s): LACTATE in the last 72 hours. Cultures:  -----------------------------------------------------------------  RAD:   XR CHEST PORTABLE   Final Result   1. Right lower lung consolidation with focal lucency at the right costophrenic angle suspicious for a small loculated pneumothorax. This could be evaluated further with chest CT if clinically warranted. Findings called to the patient's nurse at the time    of dictation. XR CHEST PORTABLE   Final Result      Mild bilateral interstitial opacities and bibasilar atelectasis, increased compared to prior. US RENAL COMPLETE   Final Result      Negative renal ultrasound. VL Extremity Venous Right   Final Result      CT CHEST WO CONTRAST   Final Result      Bilateral patchy parenchymal infiltrates are seen throughout both lungs, left greater than right. The appearance is suggestive of atypical pneumonia. Recommend follow-up imaging to verify resolution and exclude underlying nodularity. Coronary calcification. Thyromegaly with calcifications noted in the lower neck. XR CHEST PORTABLE   Final Result   1. No acute disease. 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 Lesley [facility], type 2 diabetes mellitus, hyperlipidemia, CKD stage III, HLD who presented to the ED with complaints of a fever.      Sepsis 2/2 suspected COVID PNA  Patient met SIRS criterion on arrival. Febrile and hypotensive, lactic acid normal.  Patient with symptoms of cough, weakness, body aches, concerning for a suspected respiratory infection. Given negative CXR findings, appears the COVID infection is confined to URT. CT Chest is suggestive of atypical pneumonia.   - S/p sepsis bolus in ED  - Bcx no growth; procalcitonin mildly elevated at 0.3  - Rocephin and azithromycin  - Decadron  - Barticinib     CKD IV - Cr 3.6 today (8/6) down from 4.0  Patient follows up with [facility] nephrology outpatient. - monitor Cr, UOP  - avoid nephrotoxin agents  - UOP: 1125 mL over the last day (8/5-8/6)     Hyperglycemia  - Glucose: 160 (8/6)  - Lantus 17  - HDSS     Sick Sinus Syndrome  Pt has persistent bradycardia but asymptomatic since arrival and has dipper to below 30 overnight (8/2); pt has had bradycardia with hypotension that is not responding to fluids; tele reveals sinus obed with intermittent junctional ventricular escape with sinus pauses  - Echo: normal findings and LV EF 45-50%  - Pacemaker placed; Atrial Revision complete  - HR steady at 60 bpm     Chronic Medical Conditions:     3. T2DM: Patient on 17 units of Lantus nightly. Starting patient on high-dose siding scale insulin. POCT before meals and at bedtime. 4. Hyperlipidemia: On Lipitor. 5. Neuropathic pain: Continued home Lyrica 150 mg BID. 6. TE fistula s/p treatment of metastatic osteosarcoma. No aspiration events reported. 7.  Metastatic Osteosarcoma:  Patient follows up with Dr. Fritz Danielle from [facility]. Previous treatment:  Adriamycin/cisplatin with intermitted high dose methotrexate followed by amputation. Max/CDDP Q21 days x 4 post -op 2/3/15 - 4/7/2015  Current therapy:  Gemcitabine D1,8 + Docetaxel D8 Q21D. Total cycle 21, on cycle 8.  C1D1 04/17/2019     Code Status: Full  FEN: Full  PPX: subq heparin  DISPO: IVAN Duffy DO, PGY-1  08/06/22  9:23 AM    This patient has been staffed and discussed with Dameon Muhammad MD.

## 2022-08-06 NOTE — CARE COORDINATION
Discharge order noted. JAVED spoke with Caleb Aguilar with [facility], they have not gotten precert back yet and we have not heard back yet either. [facility].      Jacqueline Ng RN, BSN, [id number]   Case Management Department  [phone]

## 2022-08-06 NOTE — DISCHARGE SUMMARY
well as Vancomycin and cefepime in the ED. Patient admitted for concern of an infectious etiology behind the weakness and fever. Blood cultures sent, covid swab done prior to admission. Patient admitted to [facility] for further management. The following issues were addressed during hospitalization:    Sepsis 2/2 suspected COVID PNA  - Patient came into the ED on 7/30 after having fallen and having body aches and a fever. Pt met SIRS criteria and came in with cough, weakness, body aches, and a concern for a suspected respiratory infection. CXR findings came back negative but Chest CT came back with concerns for atypical pneumonia. He was started on rocephin and azithromycin as well as decadron for his shortness of breath. Upon arrival to the ED, pt was satting comfortably on 3L O2 and has now been weaned off of it and is comfortable on room air. Pt CRP levels returned above 75 so he was placed on Barticinib. 8/6: Pt no longer complains of cough or bringing up sputum and does not have any shortness of breath or body aches. Sick Sinus Syndrome  - Upon arrival patient was noted to have severe bradycardia, hypotension, which was not responding to fluids. Tele revealed sinus obed with intermittent junctional ventricular escape and sinus pauses. He underwent dual-chamber pacemaker placement and then needed an atrial lead revision due to dislodgement. He is not complaining of any chest pain, dizziness, or palpitations. Pt was placed in a left arm sling. Patient has been instructed to follow up with EP in a week. CKD IV  - Pt came in with a Cr level of 2.9 that has since elvated and peaked at 4.2 during his stay at the hospital. Cr level has come back down to 3.6 and was hypothesized that this was dude to SSS and lack of perfusion to the kidneys. Continuing to monitor his Cr levels post pacemaker procedure to see if levels fall back to his baseline. Nephrology is on board and following his labs.          Physical Exam:  Physical Exam  Eyes:      Extraocular Movements: Extraocular movements intact. Cardiovascular:      Rate and Rhythm: Normal rate and regular rhythm. Pulmonary:      Effort: Pulmonary effort is normal.      Breath sounds: Normal breath sounds. Abdominal:      General: Abdomen is flat. Palpations: Abdomen is soft. Neurological:      General: No focal deficit present. Mental Status: He is alert.    Psychiatric:         Mood and Affect: Mood normal.        Consults: cardiology, ID, nephrology, and hematology/oncology  Significant Diagnostic Studies:  chest x-ray  Treatments: antibiotics: ceftriaxone and azithromycin and steroids: solu-medrol  Disposition: SNF  Discharged Condition: Stable  Follow Up: Primary Care Physician in two weeks    DISCHARGE MEDICATION:       Medication List        ASK your doctor about these medications      aspirin 81 MG tablet     atorvastatin 20 MG tablet  Commonly known as: LIPITOR     insulin glargine 100 UNIT/ML injection pen  Commonly known as: LANTUS;BASAGLAR     melatonin 10 MG Caps capsule     pregabalin 150 MG capsule  Commonly known as: LYRICA     Tylenol PM Extra Strength  MG tablet  Generic drug: diphenhydrAMINE-APAP (sleep)     vitamin D 1000 UNIT Tabs tablet  Commonly known as: CHOLECALCIFEROL               Activity: activity as tolerated  Diet: regular diet  Wound Care: none needed    Time Spent on discharge is more than 20 minutes    Signed:  Alex Whipple DO, MD, PGY-1  8/6/2022

## 2022-08-06 NOTE — PROGRESS NOTES
[facility] NEPHROLOGY    [facility]              [phone]                      Interval History and Plan:    Patient seen at bedside. Feel fine  No SOB on room air  BP better after PM placement  Cr better  Lytes stable. Wt stable. Urine output 1125      IVF 1 liter today. Check bladder scan  Q Shift and straight cath as needed for urinary retention. Trend vitals and I/O and labs closely  Monitor closely      D/W patient at bedside. Pt agree. Assessment :     Acute on chronic kidney disease stage IV:  Baseline creatinine has been around 2.4. Creatinine at the time of admission was 2.9. Patient's chronic kidney disease worse thought secondary to underlying diabetes with progressive disease along with Immunotherapy and previous diuretic use  UA: 0- 2 RBC, 3- 4 WBC  Neg protein on UA, UPCR 0.2  US: Negative  Acute decompensation is likely related to relative hypotension      Has a history of AIN from Keytruda    Hypotension. BP better now  Stopped Midodrine due to bradycardia. Now S/P PM placement.      Type 2 diabetes mellitus  Metastatic osteosarcoma  Neuropathy      [facility] Nephrology would like to thank Mili Santamaria MD   for opportunity to serve this patient      Please call with questions at-   24 Hrs Answering service [phone] or  7 am- 5 pm via Perfect serve or cell phone  Erlin Ng MD          CC/reason for consult :     MILEY/CKD     HPI :     Kendal Henderson is a 66 y.o. male ppast medical history significant for diabetes, chondroblastic osteosarcoma of left lower extremity s/p BKA in the past along with history of metastatic disease to lung and head and neck previously exposed to [place] by [facility] along with history of some degree of chronic kidney disease in the past does not follow with a nephrologist there is some paucity of labs between 2020 and 2022 with 1 labs in the Care Everywhere was admitted in June 2022 with swelling and was found to have significant edema. Along with worsening kidney function. Patient was discharged with outpatient follow-up he did not make it to any of his outpatient follow-ups. Presented to the hospital again this time with fever. Patient's temperature was running around 120s. Onset of the symptoms several progressively got worse associated with significant weakness and a fall. Nothing was making the symptoms better or worse. At the time of presentation to emergency room he was found to have hypotension with fever along with tachycardia. Was admitted to hospital for further evaluation and management. He was also found to have worsening Creatinine  Presents to the hospital again this time with significant chronic kidney disease with mild worsening of the kidney function. Given the patient's chronic kidney disease with worsening creatinine nephrology is been consulted to assist with care    ROS:       positives in bold   Constitutional:  fever, chills, weakness, weight change, fatigue  Skin:  rash, pruritus, hair loss, bruising, dry skin, petechiae  Head, Face, Neck   headaches, swelling,  cervical adenopathy  Respiratory: shortness of breath, cough, or wheezing  Cardiovascular: chest pain, palpitations, dizzy, edema  Gastrointestinal: nausea, vomiting, diarrhea, constipation,belly pain    Yellow skin, blood in stool  Musculoskeletal:  back pain, muscle weakness, gait problems,       joint pain or swelling. Genitourinary:  dysuria, poor urine flow, flank pain, blood in urine  Neurologic:  vertigo, TIA'S, syncope, seizures, focal weakness  Psychosocial:  insomnia, anxiety, or depression. Additional positive findings:                          All other remaining systems are negative.         PMH/PSH/SH/Family History:     Past Medical History:   Diagnosis Date    Arthritis     left knee and hands bilateral    Chondroblastic osteosarcoma ([facility] [address] 09/29/2014    Left distal tibia    Diabetes mellitus ([facility] [address]     type 2 1999 Hyperlipidemia     Unspecified cerebral artery occlusion with cerebral infarction 2008       Past Surgical History:   Procedure Laterality Date    ABDOMEN SURGERY      CHOLECYSTECTOMY      CHOLECYSTECTOMY      Laparoscopic    COLONOSCOPY      COLONOSCOPY      X3.  Last one 2014    ENDOSCOPY, COLON, DIAGNOSTIC      Upper GI prior to Cholecystectomy    FOREIGN BODY REMOVAL      LEG AMPUTATION BELOW KNEE Left 14    LEG SURGERY Left 14    biopsy left lower leg    LIPOMA RESECTION      OTHER SURGICAL HISTORY Left 10/22/2014    LEFT SUBCLAVIAN PORT REMOVAL              OTHER SURGICAL HISTORY Right 10/27/14    INSERTION OF SINGLE LUMEN PORT A CATH    TONSILLECTOMY      TONSILLECTOMY      childhood    TUNNELED VENOUS PORT PLACEMENT Right     Power Port       Social History     Socioeconomic History    Marital status: Single     Spouse name: Not on file    Number of children: Not on file    Years of education: Not on file    Highest education level: Not on file   Occupational History    Not on file   Tobacco Use    Smoking status: Former     Packs/day: 1.00     Years: 12.00     Pack years: 12.00     Types: Cigarettes     Quit date: 1982     Years since quittin.6    Smokeless tobacco: Never    Tobacco comments:     quit    Substance and Sexual Activity    Alcohol use: Yes     Comment: beer daily    Drug use: No    Sexual activity: Not Currently   Other Topics Concern    Not on file   Social History Narrative    ** Merged History Encounter **          Social Determinants of Health     Financial Resource Strain: Not on file   Food Insecurity: Not on file   Transportation Needs: Not on file   Physical Activity: Not on file   Stress: Not on file   Social Connections: Not on file   Intimate Partner Violence: Not on file   Housing Stability: Not on file           Problem Relation Age of Onset    Emphysema Mother     Other Brother           Medication:      sodium chloride flush  5-40 mL IntraVENous 2 times per day    sodium chloride flush  5-40 mL IntraVENous 2 times per day    insulin glargine  17 Units SubCUTAneous Daily    insulin lispro  0-16 Units SubCUTAneous TID WC    insulin lispro  0-4 Units SubCUTAneous Nightly    pantoprazole  40 mg Oral QAM AC    dexamethasone  6 mg Oral Daily    sodium chloride flush  5-40 mL IntraVENous 2 times per day    cefTRIAXone (ROCEPHIN) IV  1,000 mg IntraVENous Q24H    saccharomyces boulardii  250 mg Oral BID    baricitinib  1 mg Oral Daily    aspirin  81 mg Oral Daily    atorvastatin  20 mg Oral Daily    sodium chloride flush  5-40 mL IntraVENous 2 times per day    [Held by provider] heparin (porcine)  5,000 Units SubCUTAneous 3 times per day    sodium chloride flush  5-40 mL IntraVENous 2 times per day     sodium chloride flush, sodium chloride, sodium chloride flush, sodium chloride, diphenhydrAMINE, melatonin, sodium chloride flush, sodium chloride, ondansetron **OR** ondansetron, polyethylene glycol, acetaminophen **OR** acetaminophen, benzonatate, sodium chloride flush, sodium chloride, glucose, dextrose bolus **OR** dextrose bolus, glucagon (rDNA), dextrose       Vitals :     BP (!) 152/85   Pulse 60   Temp 97.9 °F (36.6 °C) (Oral)   Resp 16   Ht 6' (1.829 m)   Wt 190 lb 4.1 oz (86.3 kg)   SpO2 99%   BMI 25.80 kg/m²     I & O :       Intake/Output Summary (Last 24 hours) at 8/6/2022 3225  Last data filed at 8/5/2022 2355  Gross per 24 hour   Intake 1407.54 ml   Output 1125 ml   Net 282.54 ml          Physical Examination :     General appearance: Anxious- no, distressed- no, in good spirits- yes  HEENT: Lips- normal, teeth- ok , oral mucosa- moist  Neck : Mass- no, appears symmetrical, JVD- not visible  Respiratory: Respiratory effort- , wheeze- no, crackles - no  Cardiovascular:  Ausculation- S1S2, Edema mild  Abdomen: visible mass- no, distention- no, scar- no, tenderness- no                            hepatosplenomegaly- no  Musculoskeletal:  clubbing no,cyanosis- no , digital ischemia- no                           muscle strength- grossly normal , tone - grossly normal  Right BKA   Skin: rashes- no , ulcers- no, induration- no, tightening - no  Psychiatric:  mood stable effect normal   CNS: AAO x 3      LABS:     Recent Labs     08/04/22  0514 08/05/22  0523 08/06/22  0514   WBC 3.4* 4.0 5.9   HGB 10.3* 10.9* 11.7*   HCT 30.0* 31.3* 33.5*   * 126* 150       Recent Labs     08/03/22  1314 08/04/22  0514 08/05/22  0523 08/06/22  0514   NA  --  142 142 142   K  --  4.4 4.1 4.5   CL  --  104 105 105   CO2  --  23 26 26   BUN  --  68* 67* 66*   CREATININE  --  4.0* 4.0* 3.6*   GLUCOSE  --  275* 232* 200*   MG 2.00  --   --   --    PHOS  --  4.2 4.7 4.1

## 2022-08-06 NOTE — PLAN OF CARE
Problem: Safety - Adult  Goal: Free from fall injury  8/6/2022 0706 by Omer Elizalde RN  Outcome: Progressing  Flowsheets  Taken 8/6/2022 0706 by Omer Elizalde RN  Free From Fall Injury: Instruct family/caregiver on patient safety  Note: Fall precautions are in place. Bed alarm is on and in lowest position. Pt is using call light appropriately. Will continue to monitor. Problem: Cardiovascular - Adult  Goal: Maintains optimal cardiac output and hemodynamic stability  8/6/2022 0706 by Omer Elizalde RN  Outcome: Progressing  Flowsheets (Taken 8/6/2022 0706)  Maintains optimal cardiac output and hemodynamic stability:   Monitor blood pressure and heart rate   Assess for signs of decreased cardiac output  Note: Pt's VSS. AV paced on telemetry. Denies chest pain. Will continue to monitor.

## 2022-08-06 NOTE — PLAN OF CARE
Problem: Skin/Tissue Integrity  Goal: Absence of new skin breakdown  Description: 1. Monitor for areas of redness and/or skin breakdown  2. Assess vascular access sites hourly  3. Every 4-6 hours minimum:  Change oxygen saturation probe site  4. Every 4-6 hours:  If on nasal continuous positive airway pressure, respiratory therapy assess nares and determine need for appliance change or resting period.   Outcome: Progressing     Problem: Safety - Adult  Goal: Free from fall injury  8/6/2022 1822 by Katrin Mcclain RN  Outcome: Progressing     Problem: ABCDS Injury Assessment  Goal: Absence of physical injury  Outcome: Progressing  Flowsheets (Taken 8/6/2022 1803)  Absence of Physical Injury: Implement safety measures based on patient assessment     Problem: Neurosensory - Adult  Goal: Absence of seizures  Outcome: Progressing     Problem: Cardiovascular - Adult  Goal: Maintains optimal cardiac output and hemodynamic stability  8/6/2022 1822 by Katrin Mcclain RN  Outcome: Progressing     Problem: Respiratory - Adult  Goal: Achieves optimal ventilation and oxygenation  Outcome: Progressing     Problem: Skin/Tissue Integrity - Adult  Goal: Skin integrity remains intact  Outcome: Progressing

## 2022-08-07 LAB
A/G RATIO: 1.5 (ref 1.1–2.2)
ALBUMIN SERPL-MCNC: 3.4 G/DL (ref 3.4–5)
ALP BLD-CCNC: 56 U/L (ref 40–129)
ALT SERPL-CCNC: 11 U/L (ref 10–40)
ANION GAP SERPL CALCULATED.3IONS-SCNC: -4 MMOL/L (ref 3–16)
AST SERPL-CCNC: 20 U/L (ref 15–37)
BASOPHILS ABSOLUTE: 0 K/UL (ref 0–0.2)
BASOPHILS RELATIVE PERCENT: 0.8 %
BILIRUB SERPL-MCNC: 0.7 MG/DL (ref 0–1)
BUN BLDV-MCNC: 55 MG/DL (ref 7–20)
CALCIUM SERPL-MCNC: 8.7 MG/DL (ref 8.3–10.6)
CHLORIDE BLD-SCNC: 101 MMOL/L (ref 99–110)
CO2: 43 MMOL/L (ref 21–32)
CREAT SERPL-MCNC: 3.1 MG/DL (ref 0.8–1.3)
EOSINOPHILS ABSOLUTE: 0 K/UL (ref 0–0.6)
EOSINOPHILS RELATIVE PERCENT: 0 %
GFR AFRICAN AMERICAN: 24
GFR NON-AFRICAN AMERICAN: 20
GLUCOSE BLD-MCNC: 124 MG/DL (ref 70–99)
GLUCOSE BLD-MCNC: 174 MG/DL (ref 70–99)
GLUCOSE BLD-MCNC: 181 MG/DL (ref 70–99)
GLUCOSE BLD-MCNC: 196 MG/DL (ref 70–99)
GLUCOSE BLD-MCNC: 96 MG/DL (ref 70–99)
HCT VFR BLD CALC: 35.2 % (ref 40.5–52.5)
HEMOGLOBIN: 12.2 G/DL (ref 13.5–17.5)
LYMPHOCYTES ABSOLUTE: 0.4 K/UL (ref 1–5.1)
LYMPHOCYTES RELATIVE PERCENT: 5.9 %
MCH RBC QN AUTO: 34 PG (ref 26–34)
MCHC RBC AUTO-ENTMCNC: 34.7 G/DL (ref 31–36)
MCV RBC AUTO: 98 FL (ref 80–100)
MONOCYTES ABSOLUTE: 0.6 K/UL (ref 0–1.3)
MONOCYTES RELATIVE PERCENT: 9.6 %
NEUTROPHILS ABSOLUTE: 5.3 K/UL (ref 1.7–7.7)
NEUTROPHILS RELATIVE PERCENT: 83.7 %
PDW BLD-RTO: 13 % (ref 12.4–15.4)
PERFORMED ON: ABNORMAL
PERFORMED ON: NORMAL
PHOSPHORUS: 3.6 MG/DL (ref 2.5–4.9)
PLATELET # BLD: 175 K/UL (ref 135–450)
PMV BLD AUTO: 8 FL (ref 5–10.5)
POTASSIUM SERPL-SCNC: 4 MMOL/L (ref 3.5–5.1)
RBC # BLD: 3.59 M/UL (ref 4.2–5.9)
SODIUM BLD-SCNC: 140 MMOL/L (ref 136–145)
TOTAL PROTEIN: 5.7 G/DL (ref 6.4–8.2)
WBC # BLD: 6.3 K/UL (ref 4–11)

## 2022-08-07 PROCEDURE — 6370000000 HC RX 637 (ALT 250 FOR IP): Performed by: INTERNAL MEDICINE

## 2022-08-07 PROCEDURE — 6370000000 HC RX 637 (ALT 250 FOR IP)

## 2022-08-07 PROCEDURE — 2580000003 HC RX 258: Performed by: INTERNAL MEDICINE

## 2022-08-07 PROCEDURE — 6360000002 HC RX W HCPCS

## 2022-08-07 PROCEDURE — 6360000002 HC RX W HCPCS: Performed by: INTERNAL MEDICINE

## 2022-08-07 PROCEDURE — 2060000000 HC ICU INTERMEDIATE R&B

## 2022-08-07 PROCEDURE — 84100 ASSAY OF PHOSPHORUS: CPT

## 2022-08-07 PROCEDURE — 36415 COLL VENOUS BLD VENIPUNCTURE: CPT

## 2022-08-07 PROCEDURE — 85025 COMPLETE CBC W/AUTO DIFF WBC: CPT

## 2022-08-07 PROCEDURE — 80053 COMPREHEN METABOLIC PANEL: CPT

## 2022-08-07 PROCEDURE — 99233 SBSQ HOSP IP/OBS HIGH 50: CPT | Performed by: NURSE PRACTITIONER

## 2022-08-07 RX ADMIN — Medication 250 MG: at 08:48

## 2022-08-07 RX ADMIN — Medication 250 MG: at 21:08

## 2022-08-07 RX ADMIN — HEPARIN SODIUM 5000 UNITS: 5000 INJECTION INTRAVENOUS; SUBCUTANEOUS at 21:11

## 2022-08-07 RX ADMIN — PANTOPRAZOLE SODIUM 40 MG: 40 TABLET, DELAYED RELEASE ORAL at 06:08

## 2022-08-07 RX ADMIN — ACETAMINOPHEN 650 MG: 325 TABLET ORAL at 03:59

## 2022-08-07 RX ADMIN — ASPIRIN 81 MG: 81 TABLET, COATED ORAL at 08:48

## 2022-08-07 RX ADMIN — ATORVASTATIN CALCIUM 20 MG: 20 TABLET, FILM COATED ORAL at 08:47

## 2022-08-07 RX ADMIN — BARICITINIB 1 MG: 2 TABLET, FILM COATED ORAL at 08:47

## 2022-08-07 RX ADMIN — HEPARIN SODIUM 5000 UNITS: 5000 INJECTION INTRAVENOUS; SUBCUTANEOUS at 06:08

## 2022-08-07 RX ADMIN — HEPARIN SODIUM 5000 UNITS: 5000 INJECTION INTRAVENOUS; SUBCUTANEOUS at 15:42

## 2022-08-07 RX ADMIN — CEFTRIAXONE 1000 MG: 1 INJECTION, POWDER, FOR SOLUTION INTRAMUSCULAR; INTRAVENOUS at 08:55

## 2022-08-07 RX ADMIN — DEXAMETHASONE 6 MG: 4 TABLET ORAL at 08:47

## 2022-08-07 RX ADMIN — SODIUM CHLORIDE, PRESERVATIVE FREE 10 ML: 5 INJECTION INTRAVENOUS at 21:24

## 2022-08-07 ASSESSMENT — PAIN DESCRIPTION - FREQUENCY: FREQUENCY: INTERMITTENT

## 2022-08-07 ASSESSMENT — PAIN SCALES - GENERAL
PAINLEVEL_OUTOF10: 0
PAINLEVEL_OUTOF10: 3
PAINLEVEL_OUTOF10: 0

## 2022-08-07 ASSESSMENT — ENCOUNTER SYMPTOMS
SHORTNESS OF BREATH: 0
WHEEZING: 0
DIARRHEA: 0
COUGH: 0
CONSTIPATION: 0
CHEST TIGHTNESS: 0

## 2022-08-07 ASSESSMENT — PAIN DESCRIPTION - LOCATION: LOCATION: HEAD

## 2022-08-07 ASSESSMENT — PAIN DESCRIPTION - DESCRIPTORS: DESCRIPTORS: ACHING

## 2022-08-07 ASSESSMENT — PAIN DESCRIPTION - PAIN TYPE: TYPE: ACUTE PAIN

## 2022-08-07 ASSESSMENT — PAIN DESCRIPTION - ORIENTATION: ORIENTATION: ANTERIOR

## 2022-08-07 ASSESSMENT — PAIN - FUNCTIONAL ASSESSMENT: PAIN_FUNCTIONAL_ASSESSMENT: ACTIVITIES ARE NOT PREVENTED

## 2022-08-07 ASSESSMENT — PAIN DESCRIPTION - ONSET: ONSET: ON-GOING

## 2022-08-07 NOTE — PROGRESS NOTES
[facility]    [facility]              [phone]                      Interval History and Plan:    Patient seen at bedside. Feel fine  No SOB on room air  BP better after PM placement  Cr improving  Lytes stable. Wt stable. Urine output 2.4 liter      Monitor without IVF  Oral hydration  Check bladder scan  Q Shift and straight cath as needed for urinary retention. Trend vitals and I/O and labs closely  Monitor closely  Patient has PCP at [place] and prefer to have nephrologist at [place] also. Patient not sure if has nephrologist there. Advised to request PCP for nephrology referral at [place]. D/W patient at bedside. Pt agree. Assessment :     Acute on chronic kidney disease stage IV:  Baseline creatinine has been around 2.4. Creatinine at the time of admission was 2.9. Patient's chronic kidney disease worse thought secondary to underlying diabetes with progressive disease along with Immunotherapy and previous diuretic use  UA: 0- 2 RBC, 3- 4 WBC  Neg protein on UA, UPCR 0.2  US: Negative  Acute decompensation is likely related to relative hypotension      Has a history of AIN from Keytruda    Hypotension. BP better now  Stopped Midodrine due to bradycardia. Now S/P PM placement.      Type 2 diabetes mellitus  Metastatic osteosarcoma  Neuropathy      [address] Nephrology would like to thank [facility], MD   for opportunity to serve this patient      Please call with questions at-   24 Hrs Answering service [phone] or  7 am- 5 pm via Perfect serve or cell phone  Everett Peters MD          CC/reason for consult :     MILEY/CKD     HPI :     Martine Carter is a 66 y.o. 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 Along with worsening kidney function. Patient was discharged with outpatient follow-up he did not make it to any of his outpatient follow-ups. Presented to the hospital again this time with fever. Patient's temperature was running around 120s. Onset of the symptoms several progressively got worse associated with significant weakness and a fall. Nothing was making the symptoms better or worse. At the time of presentation to emergency room he was found to have hypotension with fever along with tachycardia. Was admitted to hospital for further evaluation and management. He was also found to have worsening Creatinine  Presents to the hospital again this time with significant chronic kidney disease with mild worsening of the kidney function. Given the patient's chronic kidney disease with worsening creatinine nephrology is been consulted to assist with care    ROS:       positives in bold   Constitutional:  fever, chills, weakness, weight change, fatigue  Skin:  rash, pruritus, hair loss, bruising, dry skin, petechiae  Head, Face, Neck   headaches, swelling,  cervical adenopathy  Respiratory: shortness of breath, cough, or wheezing  Cardiovascular: chest pain, palpitations, dizzy, edema  Gastrointestinal: nausea, vomiting, diarrhea, constipation,belly pain    Yellow skin, blood in stool  Musculoskeletal:  back pain, muscle weakness, gait problems,       joint pain or swelling. Genitourinary:  dysuria, poor urine flow, flank pain, blood in urine  Neurologic:  vertigo, TIA'S, syncope, seizures, focal weakness  Psychosocial:  insomnia, anxiety, or depression. Additional positive findings:                          All other remaining systems are negative.         PMH/PSH/SH/Family History:     Past Medical History:   Diagnosis Date    Arthritis     left knee and hands bilateral    Chondroblastic osteosarcoma ([facility] [address] 2014    Left distal tibia    Diabetes mellitus ([facility] [address]     type 2 1999    Hyperlipidemia     Unspecified cerebral artery occlusion with cerebral infarction 2008       Past Surgical History:   Procedure Laterality Date    ABDOMEN SURGERY      CHOLECYSTECTOMY      CHOLECYSTECTOMY      Laparoscopic    COLONOSCOPY      COLONOSCOPY      X3.  Last one 2014    ENDOSCOPY, COLON, DIAGNOSTIC      Upper GI prior to Cholecystectomy    FOREIGN BODY REMOVAL      LEG AMPUTATION BELOW KNEE Left 14    LEG SURGERY Left 14    biopsy left lower leg    LIPOMA RESECTION      OTHER SURGICAL HISTORY Left 10/22/2014    LEFT SUBCLAVIAN PORT REMOVAL              OTHER SURGICAL HISTORY Right 10/27/14    INSERTION OF SINGLE LUMEN PORT A CATH    TONSILLECTOMY      TONSILLECTOMY      childhood    TUNNELED VENOUS PORT PLACEMENT Right     Power Port       Social History     Socioeconomic History    Marital status: Single     Spouse name: Not on file    Number of children: Not on file    Years of education: Not on file    Highest education level: Not on file   Occupational History    Not on file   Tobacco Use    Smoking status: Former     Packs/day: 1.00     Years: 12.00     Pack years: 12.00     Types: Cigarettes     Quit date: 1982     Years since quittin.6    Smokeless tobacco: Never    Tobacco comments:     quit    Substance and Sexual Activity    Alcohol use: Yes     Comment: beer daily    Drug use: No    Sexual activity: Not Currently   Other Topics Concern    Not on file   Social History Narrative    ** Merged History Encounter **          Social Determinants of Health     Financial Resource Strain: Not on file   Food Insecurity: Not on file   Transportation Needs: Not on file   Physical Activity: Not on file   Stress: Not on file   Social Connections: Not on file   Intimate Partner Violence: Not on file   Housing Stability: Not on file           Problem Relation Age of Onset    Emphysema Mother     Other Brother           Medication:      sodium chloride flush  5-40 mL IntraVENous 2 times per day    sodium chloride flush  5-40 mL IntraVENous 2 times per day    insulin glargine  17 Units SubCUTAneous Daily    insulin lispro  0-16 Units SubCUTAneous TID WC    insulin lispro  0-4 Units SubCUTAneous Nightly    pantoprazole  40 mg Oral QAM AC    dexamethasone  6 mg Oral Daily    sodium chloride flush  5-40 mL IntraVENous 2 times per day    cefTRIAXone (ROCEPHIN) IV  1,000 mg IntraVENous Q24H    saccharomyces boulardii  250 mg Oral BID    baricitinib  1 mg Oral Daily    aspirin  81 mg Oral Daily    atorvastatin  20 mg Oral Daily    sodium chloride flush  5-40 mL IntraVENous 2 times per day    heparin (porcine)  5,000 Units SubCUTAneous 3 times per day    sodium chloride flush  5-40 mL IntraVENous 2 times per day     sodium chloride flush, sodium chloride, sodium chloride flush, sodium chloride, diphenhydrAMINE, melatonin, sodium chloride flush, sodium chloride, ondansetron **OR** ondansetron, polyethylene glycol, acetaminophen **OR** acetaminophen, benzonatate, sodium chloride flush, sodium chloride, glucose, dextrose bolus **OR** dextrose bolus, glucagon (rDNA), dextrose       Vitals :     BP (!) 162/90   Pulse 60   Temp 99.3 °F (37.4 °C) (Oral)   Resp 18   Ht 6' (1.829 m)   Wt 186 lb 8.2 oz (84.6 kg)   SpO2 96%   BMI 25.30 kg/m²     I & O :       Intake/Output Summary (Last 24 hours) at 8/7/2022 9380  Last data filed at 8/7/2022 2516  Gross per 24 hour   Intake 250 ml   Output 2475 ml   Net -2225 ml          Physical Examination :     General appearance: Anxious- no, distressed- no, in good spirits- yes  HEENT: Lips- normal, teeth- ok , oral mucosa- moist  Neck : Mass- no, appears symmetrical, JVD- not visible  Respiratory: Respiratory effort- , wheeze- no, crackles - no  Cardiovascular:  Ausculation- S1S2, Edema mild  Abdomen: visible mass- no, distention- no, scar- no, tenderness- no                            hepatosplenomegaly-  no  Musculoskeletal:  clubbing no,cyanosis- no , digital ischemia- no                           muscle strength- grossly normal , tone - grossly normal  Right BKA   Skin: rashes- no , ulcers- no, induration- no, tightening - no  Psychiatric:  mood stable effect normal   CNS: AAO x 3      LABS:     Recent Labs     08/05/22 0523 08/06/22  0514 08/07/22  0451   WBC 4.0 5.9 6.3   HGB 10.9* 11.7* 12.2*   HCT 31.3* 33.5* 35.2*   * 150 175       Recent Labs     08/05/22 0523 08/06/22  0514 08/07/22  0451    142 140   K 4.1 4.5 4.0    105 101   CO2 26 26 43*   BUN 67* 66* 55*   CREATININE 4.0* 3.6* 3.1*   GLUCOSE 232* 200* 124*   PHOS 4.7 4.1 3.6

## 2022-08-07 NOTE — PROGRESS NOTES
Progress Note    Admit Date: 7/30/2022  Day: 9  Diet: ADULT DIET; Regular    CC: Fever, Fatigue    Interval history:   - Pt was seen and examined at bedside this morning; he is afebrile and satting comfortably on room air  - Pt had atrial lead revision yesterday and is currently in a left arm sling; EP has seen him today and confirmed that he is cleared for discharge on their end  - Pt Cr is trending down to 3.6 from 4.0 yesterday; he states that his cough is no longer bothering him and is not bringing up anymore phlegm  - [facility] and they are working on approval   - LR has been turned off due to patient blood pressure becoming more hypertensive    HPI:   The patient is a 69-year-old male with past medical history of 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 Jesus Harris [facility], type 2 diabetes mellitus, hyperlipidemia, CKD stage III, HLD who presented to the ED with complaints of a fever. He reports that he checks his temperature daily and decided to come to the ED when he had a temperature of 100.2 today. He also reports feeling weak and had a fall today while shifting himself from the chair to the bed. Review of systems positive for body aches and fever. He denies chills, chest pain, shortness of, abdominal pain, changes in bowel habits, dysuria. Physical exam revealed rhonchorous lungs. Abdomen distended, chronic hernia, nontender. BKA of the left leg. Strikingly, the skin below the right knee extending towards the ankle was markedly warm without any erythema. Patient denies any recent swelling or pain in the leg. Multiple excoriations seen on the skin in the LE. On arrival to the ED, febrile to 100.6, BP 93/53, reportedly tachycardic, was on room air. Work-up in the ED revealing a slight rise in baseline creatinine. Normal leukocyte count as well as normal hemoglobin levels.   UA unremarkable for a UTI. VBG normal.  Chest x-ray did not show any acute abnormality. Patient received sepsis bolus as well as Vancomycin and cefepime in the ED. Patient admitted for concern of an infectious etiology behind the weakness and fever. Blood cultures sent, covid swab done prior to admission. Patient admitted to [facility] for further management.     Medications:     Scheduled Meds:   sodium chloride flush  5-40 mL IntraVENous 2 times per day    sodium chloride flush  5-40 mL IntraVENous 2 times per day    insulin glargine  17 Units SubCUTAneous Daily    insulin lispro  0-16 Units SubCUTAneous TID WC    insulin lispro  0-4 Units SubCUTAneous Nightly    pantoprazole  40 mg Oral QAM AC    dexamethasone  6 mg Oral Daily    sodium chloride flush  5-40 mL IntraVENous 2 times per day    saccharomyces boulardii  250 mg Oral BID    baricitinib  1 mg Oral Daily    aspirin  81 mg Oral Daily    atorvastatin  20 mg Oral Daily    sodium chloride flush  5-40 mL IntraVENous 2 times per day    heparin (porcine)  5,000 Units SubCUTAneous 3 times per day    sodium chloride flush  5-40 mL IntraVENous 2 times per day     Continuous Infusions:   sodium chloride      sodium chloride      sodium chloride 10 mL (08/01/22 6486)    sodium chloride      dextrose       PRN Meds:sodium chloride flush, sodium chloride, sodium chloride flush, sodium chloride, diphenhydrAMINE, melatonin, sodium chloride flush, sodium chloride, ondansetron **OR** ondansetron, polyethylene glycol, acetaminophen **OR** acetaminophen, benzonatate, sodium chloride flush, sodium chloride, glucose, dextrose bolus **OR** dextrose bolus, glucagon (rDNA), dextrose    Objective:   Vitals:   T-max:  Patient Vitals for the past 8 hrs:   BP Temp Temp src Pulse Resp SpO2 Weight   08/07/22 0611 -- -- -- -- -- -- 186 lb 8.2 oz (84.6 kg)   08/07/22 0600 -- -- -- 60 -- -- --   08/07/22 0400 -- -- -- 64 -- -- --   08/07/22 0351 (!) 162/90 99.3 °F (37.4 °C) Oral 64 18 96 % -- Intake/Output Summary (Last 24 hours) at 8/7/2022 1138  Last data filed at 8/7/2022 2567  Gross per 24 hour   Intake 250 ml   Output 2475 ml   Net -2225 ml         Review of Systems   Constitutional: Negative. Respiratory:  Negative for cough, chest tightness, shortness of breath and wheezing. Cardiovascular:  Negative for chest pain and palpitations. Gastrointestinal:  Negative for constipation and diarrhea. Genitourinary:  Negative for difficulty urinating. Psychiatric/Behavioral: Negative. Physical Exam  Eyes:      Extraocular Movements: Extraocular movements intact. Cardiovascular:      Rate and Rhythm: Normal rate and regular rhythm. Pulses: Normal pulses. Heart sounds: Normal heart sounds. No murmur heard. No gallop. Pulmonary:      Effort: Pulmonary effort is normal.      Breath sounds: Normal breath sounds. Abdominal:      General: Abdomen is flat. Palpations: Abdomen is soft. Neurological:      General: No focal deficit present. Mental Status: He is alert. Psychiatric:         Mood and Affect: Mood normal.       LABS:    CBC:   Recent Labs     08/05/22 0523 08/06/22 0514 08/07/22 0451   WBC 4.0 5.9 6.3   HGB 10.9* 11.7* 12.2*   HCT 31.3* 33.5* 35.2*   * 150 175   MCV 99.4 99.8 98.0       Renal:    Recent Labs     08/05/22 0523 08/06/22 0514 08/07/22 0451    142 140   K 4.1 4.5 4.0    105 101   CO2 26 26 43*   BUN 67* 66* 55*   CREATININE 4.0* 3.6* 3.1*   GLUCOSE 232* 200* 124*   CALCIUM 8.3 8.4 8.7   PHOS 4.7 4.1 3.6   ANIONGAP 11 11 -4*       Hepatic:   Recent Labs     08/05/22 0523 08/06/22  0514 08/07/22  0451   AST 25 20 20   ALT 20 15 11   BILITOT 0.4 0.4 0.7   PROT 5.0* 5.2* 5.7*   LABALBU 3.3* 3.3* 3.4   ALKPHOS 51 55 56       Troponin: No results for input(s): TROPONINI in the last 72 hours. BNP: No results for input(s): BNP in the last 72 hours.   Lipids: No results for input(s): CHOL, HDL in the last 72 hours.    Invalid input(s): LDLCALCU, TRIGLYCERIDE  ABGs:  No results for input(s): PHART, VMC8PIS, PO2ART, MQK1MGW, BEART, THGBART, I5GFTNDP, OAC8MGZ in the last 72 hours. INR:   Recent Labs     08/05/22  0523   INR 1.07       Lactate: No results for input(s): LACTATE in the last 72 hours. Cultures:  -----------------------------------------------------------------  RAD:   XR CHEST PORTABLE   Final Result   1. Right lower lung consolidation with focal lucency at the right costophrenic angle suspicious for a small loculated pneumothorax. This could be evaluated further with chest CT if clinically warranted. Findings called to the patient's nurse at the time    of dictation. XR CHEST PORTABLE   Final Result      Mild bilateral interstitial opacities and bibasilar atelectasis, increased compared to prior. US RENAL COMPLETE   Final Result      Negative renal ultrasound. VL Extremity Venous Right   Final Result      CT CHEST WO CONTRAST   Final Result      Bilateral patchy parenchymal infiltrates are seen throughout both lungs, left greater than right. The appearance is suggestive of atypical pneumonia. Recommend follow-up imaging to verify resolution and exclude underlying nodularity. Coronary calcification. Thyromegaly with calcifications noted in the lower neck. XR CHEST PORTABLE   Final Result   1. No acute disease. 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 Fantasma Kim [facility], type 2 diabetes mellitus, hyperlipidemia, CKD stage III, HLD who presented to the ED with complaints of a fever.      Sepsis 2/2 suspected COVID PNA  Patient met SIRS criterion on arrival. Febrile and hypotensive, lactic acid normal.  Patient with symptoms of cough, weakness, body aches, concerning for a suspected respiratory infection. Given negative CXR findings, appears the COVID infection is confined to URT. CT Chest is suggestive of atypical pneumonia.   - S/p sepsis bolus in ED  - Bcx no growth; procalcitonin mildly elevated at 0.3  - Rocephin and azithromycin  - Decadron  - Barticinib     CKD IV - Cr 3.6 today (8/6) down from 4.0  Patient follows up with [facility] nephrology outpatient. - monitor Cr, UOP  - avoid nephrotoxin agents  - UOP: 1125 mL over the last day (8/5-8/6)     Hyperglycemia  - Glucose: 160 (8/6)  - Lantus 17  - HDSS     Sick Sinus Syndrome  Pt has persistent bradycardia but asymptomatic since arrival and has dipper to below 30 overnight (8/2); pt has had bradycardia with hypotension that is not responding to fluids; tele reveals sinus obed with intermittent junctional ventricular escape with sinus pauses  - Echo: normal findings and LV EF 45-50%  - Pacemaker placed; Atrial Revision complete  - HR steady at 60 bpm     Chronic Medical Conditions:     3. T2DM: Patient on 17 units of Lantus nightly. Starting patient on high-dose siding scale insulin. POCT before meals and at bedtime. 4. Hyperlipidemia: On Lipitor. 5. Neuropathic pain: Continued home Lyrica 150 mg BID. 6. TE fistula s/p treatment of metastatic osteosarcoma. No aspiration events reported. 7.  Metastatic Osteosarcoma:  Patient follows up with Dr. Bre Pinto from [facility]. Previous treatment:  Adriamycin/cisplatin with intermitted high dose methotrexate followed by amputation. Max/CDDP Q21 days x 4 post -op 2/3/15 - 4/7/2015  Current therapy:  Gemcitabine D1,8 + Docetaxel D8 Q21D. Total cycle 21, on cycle 8.  C1D1 04/17/2019     Code Status: Full  FEN: Full  PPX: subq heparin  DISPO: Leonardo Antonio MD, PGY-1  08/07/22  11:38 AM    This patient has been staffed and discussed with Kaushal Reese MD.     Attending Supervising Physicians Attestation Statement  I have seen, examined and evaluated the patient as did the resident physician on 08/07/22. We have discussed the plan of care and decisions made during that discussion were incorporated into this note. I have reviewed the resident physician's note and agree with the assessment and plan of care as documented. No acute events ported overnight. Patient doing well this morning. Awaiting placement to SNF. Patient is medically ready for discharge.     Kristen Darnell MD

## 2022-08-07 NOTE — CARE COORDINATION
CM following for discharge planning. Waiting on precert to [address] from [place]. CM received phone call today from Tenzin Correa that if pt remains on   Barticinib, at discharge he will need to bring this medication with him as they do not have it or it will need to be discontinued prior to discharge.      Jazmin Hendricks RN, BSN, [address]  Case Management Department  [phone]

## 2022-08-07 NOTE — PLAN OF CARE
Problem: Skin/Tissue Integrity  Goal: Absence of new skin breakdown  Description: 1. Monitor for areas of redness and/or skin breakdown  2. Assess vascular access sites hourly  3. Every 4-6 hours minimum:  Change oxygen saturation probe site  4. Every 4-6 hours:  If on nasal continuous positive airway pressure, respiratory therapy assess nares and determine need for appliance change or resting period.   Outcome: Progressing     Problem: Safety - Adult  Goal: Free from fall injury  8/7/2022 1335 by Asuncion Luna RN  Outcome: Progressing     Problem: Pain  Goal: Verbalizes/displays adequate comfort level or baseline comfort level  Outcome: Progressing     Problem: ABCDS Injury Assessment  Goal: Absence of physical injury  Outcome: Progressing  Flowsheets (Taken 8/7/2022 1332)  Absence of Physical Injury: Implement safety measures based on patient assessment     Problem: Cardiovascular - Adult  Goal: Absence of cardiac dysrhythmias or at baseline  Outcome: Progressing     Problem: Skin/Tissue Integrity - Adult  Goal: Incisions, wounds, or drain sites healing without S/S of infection  Outcome: Progressing

## 2022-08-07 NOTE — PLAN OF CARE
Problem: Cardiovascular - Adult  Goal: Maintains optimal cardiac output and hemodynamic stability  8/7/2022 0320 by Maile Apley, RN  Outcome: Progressing  Flowsheets (Taken 8/7/2022 0320)  Maintains optimal cardiac output and hemodynamic stability:   Monitor blood pressure and heart rate   Assess for signs of decreased cardiac output  Note: Pt's VSS. Atrial paced on telemetry. Left arm in sling due to pacemaker placed. Will continue to monitor. Problem: Safety - Adult  Goal: Free from fall injury  8/7/2022 0320 by Maile Apley, RN  Outcome: Progressing  Flowsheets (Taken 8/7/2022 0320)  Free From Fall Injury: Instruct family/caregiver on patient safety  Note: Fall precautions are in place. Bed alarm is on and in lowest position. Pt is using call light appropriately. Will continue to monitor.

## 2022-08-07 NOTE — PROGRESS NOTES
Electrophysiology - PROGRESS NOTE    Admit Date: 7/30/2022     Chief Complaint: SSS     Interval History:   Patient seen and examined and notes reviewed. Patient is being followed for SSS. Patient admitted to the hospital after suffering a fall at home. He was noted to be febrile, hypotensive, tachycardic and tested positive for COVID. He was noted to have severe bradycardia and hypotension that was not responding to fluids. Telemetry revealed severe sinus bradycardia with intermittent junctional ventricular escape with the longest sinus pause lasting about 9 seconds. He underwent dual-chamber [employer] pacemaker 8/3/22 with Dr. Rahel Chua. Device check 8/4/22 showed atrial lead dislodgement and pt underwent atrial lead revision yesterday with Dr. Rahel Chua. No c/o CP, SOB, dizziness, LH, palps. BP slightly elevated overnight. In: 250 [P.O.:240;  I.V.:10]  Out: 2475    Wt Readings from Last 2 Encounters:   08/07/22 186 lb 8.2 oz (84.6 kg)   06/24/22 201 lb 11.5 oz (91.5 kg)         Data:   Scheduled Meds:   Scheduled Meds:   sodium chloride flush  5-40 mL IntraVENous 2 times per day    sodium chloride flush  5-40 mL IntraVENous 2 times per day    insulin glargine  17 Units SubCUTAneous Daily    insulin lispro  0-16 Units SubCUTAneous TID WC    insulin lispro  0-4 Units SubCUTAneous Nightly    pantoprazole  40 mg Oral QAM AC    dexamethasone  6 mg Oral Daily    sodium chloride flush  5-40 mL IntraVENous 2 times per day    cefTRIAXone (ROCEPHIN) IV  1,000 mg IntraVENous Q24H    saccharomyces boulardii  250 mg Oral BID    baricitinib  1 mg Oral Daily    aspirin  81 mg Oral Daily    atorvastatin  20 mg Oral Daily    sodium chloride flush  5-40 mL IntraVENous 2 times per day    heparin (porcine)  5,000 Units SubCUTAneous 3 times per day    sodium chloride flush  5-40 mL IntraVENous 2 times per day     Continuous Infusions:   sodium chloride      sodium chloride -----------------------------------------------------------------  Telemetry: Personally reviewed  AP    Objective:   Vitals: BP (!) 162/90   Pulse 60   Temp 99.3 °F (37.4 °C) (Oral)   Resp 18   Ht 6' (1.829 m)   Wt 186 lb 8.2 oz (84.6 kg)   SpO2 96%   BMI 25.30 kg/m²   General appearance: alert, appears stated age and cooperative, No acute distress   Skin: Skin color, texture, turgor normal. No rashes or ecchymosis. L chest incision CDI  Neck: no JVD, supple, symmetrical, trachea midline   Lungs: , no accessory muscle use, no respiratory distress  Heart: RRR  Extremities: No edema, DP +  Psychiatric: normal insight and affect    Patient Active Problem List:     Chondroblastic osteosarcoma ([address]     Micturition syncope     Fever and chills     Diabetes (HCC)     Left BKA     Port-A-Cath in place     Cellulitis     Stage 3b chronic kidney disease ([address]     Sepsis ([address]     SSS (sick sinus syndrome) (HCC)     Bradycardia     Septicemia ([address]        Assessment & Plan:    1. COVID positive  2. SSS  3.pacemaker  4.osteosarcoma    Owen Mendes Is a 66 y.o. man with PMH osteosarcoma of the left leg with mets to the lung and thyroid, s/p left BKA, CKD stage III, history of tracheoesophageal fistula, HTN who presented after a fall at home, noted to be febrile, hypotensive, tachycardic, tested positive for COVID, noted to have severe bradycardia and hypotension, noted to have SSS on telemetry, s/p [facility] MDT PPM (8/3/2022, Dr. Bradford García)    SSS  - s/p dual [facility] MDT PPM (8/3/2022, Dr. Bradford García)  - Left chest incision CDI  - CXR shows appropriate placement of leads, does show suspicion for small loculated pneumothorax at right costophrenic angle- reviewed CXR with Dr. Bradford García who does not feel this is related to PPM d/t location and that it is loculated, no further workup per cardiology, will defer to primary team, Dr. Faisal Benson updated  - Device check shows appropriate function of leads  - Reviewed post procedure instructions

## 2022-08-08 ENCOUNTER — APPOINTMENT (OUTPATIENT)
Dept: GENERAL RADIOLOGY | Age: 72
DRG: 853 | End: 2022-08-08
Payer: OTHER GOVERNMENT

## 2022-08-08 VITALS
WEIGHT: 186.73 LBS | HEART RATE: 64 BPM | OXYGEN SATURATION: 93 % | TEMPERATURE: 98.4 F | BODY MASS INDEX: 25.29 KG/M2 | DIASTOLIC BLOOD PRESSURE: 54 MMHG | SYSTOLIC BLOOD PRESSURE: 97 MMHG | RESPIRATION RATE: 16 BRPM | HEIGHT: 72 IN

## 2022-08-08 LAB
A/G RATIO: 1.2 (ref 1.1–2.2)
ALBUMIN SERPL-MCNC: 3 G/DL (ref 3.4–5)
ALP BLD-CCNC: 58 U/L (ref 40–129)
ALT SERPL-CCNC: 11 U/L (ref 10–40)
ANION GAP SERPL CALCULATED.3IONS-SCNC: 7 MMOL/L (ref 3–16)
AST SERPL-CCNC: 20 U/L (ref 15–37)
BASOPHILS ABSOLUTE: 0 K/UL (ref 0–0.2)
BASOPHILS RELATIVE PERCENT: 0.1 %
BILIRUB SERPL-MCNC: 0.7 MG/DL (ref 0–1)
BUN BLDV-MCNC: 51 MG/DL (ref 7–20)
CALCIUM SERPL-MCNC: 8.4 MG/DL (ref 8.3–10.6)
CHLORIDE BLD-SCNC: 100 MMOL/L (ref 99–110)
CO2: 31 MMOL/L (ref 21–32)
CREAT SERPL-MCNC: 3.1 MG/DL (ref 0.8–1.3)
EOSINOPHILS ABSOLUTE: 0 K/UL (ref 0–0.6)
EOSINOPHILS RELATIVE PERCENT: 0.4 %
GFR AFRICAN AMERICAN: 24
GFR NON-AFRICAN AMERICAN: 20
GLUCOSE BLD-MCNC: 102 MG/DL (ref 70–99)
GLUCOSE BLD-MCNC: 130 MG/DL (ref 70–99)
GLUCOSE BLD-MCNC: 137 MG/DL (ref 70–99)
GLUCOSE BLD-MCNC: 63 MG/DL (ref 70–99)
GLUCOSE BLD-MCNC: 65 MG/DL (ref 70–99)
HCT VFR BLD CALC: 35.5 % (ref 40.5–52.5)
HEMOGLOBIN: 12.1 G/DL (ref 13.5–17.5)
LYMPHOCYTES ABSOLUTE: 0.5 K/UL (ref 1–5.1)
LYMPHOCYTES RELATIVE PERCENT: 8.3 %
MCH RBC QN AUTO: 33.7 PG (ref 26–34)
MCHC RBC AUTO-ENTMCNC: 34.2 G/DL (ref 31–36)
MCV RBC AUTO: 98.7 FL (ref 80–100)
MONOCYTES ABSOLUTE: 0.5 K/UL (ref 0–1.3)
MONOCYTES RELATIVE PERCENT: 8.1 %
NEUTROPHILS ABSOLUTE: 5.2 K/UL (ref 1.7–7.7)
NEUTROPHILS RELATIVE PERCENT: 83.1 %
PDW BLD-RTO: 12.8 % (ref 12.4–15.4)
PERFORMED ON: ABNORMAL
PHOSPHORUS: 3.6 MG/DL (ref 2.5–4.9)
PLATELET # BLD: 198 K/UL (ref 135–450)
PMV BLD AUTO: 8.2 FL (ref 5–10.5)
POTASSIUM SERPL-SCNC: 3.8 MMOL/L (ref 3.5–5.1)
RBC # BLD: 3.6 M/UL (ref 4.2–5.9)
SODIUM BLD-SCNC: 138 MMOL/L (ref 136–145)
TOTAL PROTEIN: 5.5 G/DL (ref 6.4–8.2)
WBC # BLD: 6.3 K/UL (ref 4–11)

## 2022-08-08 PROCEDURE — 2580000003 HC RX 258: Performed by: INTERNAL MEDICINE

## 2022-08-08 PROCEDURE — 80053 COMPREHEN METABOLIC PANEL: CPT

## 2022-08-08 PROCEDURE — 85025 COMPLETE CBC W/AUTO DIFF WBC: CPT

## 2022-08-08 PROCEDURE — 6360000002 HC RX W HCPCS

## 2022-08-08 PROCEDURE — 36415 COLL VENOUS BLD VENIPUNCTURE: CPT

## 2022-08-08 PROCEDURE — 84100 ASSAY OF PHOSPHORUS: CPT

## 2022-08-08 PROCEDURE — 6370000000 HC RX 637 (ALT 250 FOR IP): Performed by: INTERNAL MEDICINE

## 2022-08-08 PROCEDURE — 6370000000 HC RX 637 (ALT 250 FOR IP)

## 2022-08-08 PROCEDURE — 71045 X-RAY EXAM CHEST 1 VIEW: CPT

## 2022-08-08 RX ORDER — DEXAMETHASONE 6 MG/1
6 TABLET ORAL DAILY
Qty: 3 TABLET | Refills: 0 | Status: SHIPPED | OUTPATIENT
Start: 2022-08-09 | End: 2022-08-12

## 2022-08-08 RX ORDER — PANTOPRAZOLE SODIUM 40 MG/1
40 TABLET, DELAYED RELEASE ORAL
Qty: 30 TABLET | Refills: 3 | Status: SHIPPED | OUTPATIENT
Start: 2022-08-09

## 2022-08-08 RX ADMIN — HEPARIN SODIUM 5000 UNITS: 5000 INJECTION INTRAVENOUS; SUBCUTANEOUS at 05:33

## 2022-08-08 RX ADMIN — PANTOPRAZOLE SODIUM 40 MG: 40 TABLET, DELAYED RELEASE ORAL at 05:32

## 2022-08-08 RX ADMIN — BARICITINIB 1 MG: 2 TABLET, FILM COATED ORAL at 10:38

## 2022-08-08 RX ADMIN — DEXAMETHASONE 6 MG: 4 TABLET ORAL at 10:35

## 2022-08-08 RX ADMIN — ATORVASTATIN CALCIUM 20 MG: 20 TABLET, FILM COATED ORAL at 10:35

## 2022-08-08 RX ADMIN — Medication 250 MG: at 10:35

## 2022-08-08 RX ADMIN — ASPIRIN 81 MG: 81 TABLET, COATED ORAL at 10:35

## 2022-08-08 RX ADMIN — SODIUM CHLORIDE, PRESERVATIVE FREE 5 ML: 5 INJECTION INTRAVENOUS at 10:35

## 2022-08-08 ASSESSMENT — ENCOUNTER SYMPTOMS
DIARRHEA: 0
CHEST TIGHTNESS: 0
SHORTNESS OF BREATH: 0
ABDOMINAL PAIN: 0

## 2022-08-08 ASSESSMENT — PAIN SCALES - GENERAL
PAINLEVEL_OUTOF10: 0
PAINLEVEL_OUTOF10: 0

## 2022-08-08 NOTE — PROGRESS NOTES
Progress Note    Admit Date: 7/30/2022  Day: 10  Diet: ADULT DIET; Regular    CC: Fever, Fatigue    Interval history:   - Pt was seen and examined at bedside this morning; he is afebrile and satting comfortably on room air  - Pt is doing well this morning and has no complaints; he states that he no longer has a cough and his diarrhea has resolved  - Awaiting precert from [place] to be placed at [facility] rehab   - EP has cleared him for discharge on their end and nephrology has stated in their note that pt has a PCP at the [place] and encourages him to request for a nephrology referral there  - Pt Cr is trending down and is currently at 3.1. 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 Riana Wills [facility], type 2 diabetes mellitus, hyperlipidemia, CKD stage III, HLD who presented to the ED with complaints of a fever. He reports that he checks his temperature daily and decided to come to the ED when he had a temperature of 100.2 today. He also reports feeling weak and had a fall today while shifting himself from the chair to the bed. Review of systems positive for body aches and fever. He denies chills, chest pain, shortness of, abdominal pain, changes in bowel habits, dysuria. Physical exam revealed rhonchorous lungs. Abdomen distended, chronic hernia, nontender. BKA of the left leg. Strikingly, the skin below the right knee extending towards the ankle was markedly warm without any erythema. Patient denies any recent swelling or pain in the leg. Multiple excoriations seen on the skin in the LE. On arrival to the ED, febrile to 100.6, BP 93/53, reportedly tachycardic, was on room air. Work-up in the ED revealing a slight rise in baseline creatinine. Normal leukocyte count as well as normal hemoglobin levels. UA unremarkable for a UTI.   VBG normal.  Chest x-ray did not show any acute abnormality. Patient received sepsis bolus as well as Vancomycin and cefepime in the ED. Patient admitted for concern of an infectious etiology behind the weakness and fever. Blood cultures sent, covid swab done prior to admission. Patient admitted to [facility] for further management.     Medications:     Scheduled Meds:   sodium chloride flush  5-40 mL IntraVENous 2 times per day    sodium chloride flush  5-40 mL IntraVENous 2 times per day    insulin glargine  17 Units SubCUTAneous Daily    insulin lispro  0-16 Units SubCUTAneous TID WC    insulin lispro  0-4 Units SubCUTAneous Nightly    pantoprazole  40 mg Oral QAM AC    dexamethasone  6 mg Oral Daily    sodium chloride flush  5-40 mL IntraVENous 2 times per day    saccharomyces boulardii  250 mg Oral BID    baricitinib  1 mg Oral Daily    aspirin  81 mg Oral Daily    atorvastatin  20 mg Oral Daily    sodium chloride flush  5-40 mL IntraVENous 2 times per day    heparin (porcine)  5,000 Units SubCUTAneous 3 times per day    sodium chloride flush  5-40 mL IntraVENous 2 times per day     Continuous Infusions:   sodium chloride      sodium chloride      sodium chloride 10 mL (08/01/22 2246)    sodium chloride      dextrose       PRN Meds:sodium chloride flush, sodium chloride, sodium chloride flush, sodium chloride, diphenhydrAMINE, melatonin, sodium chloride flush, sodium chloride, ondansetron **OR** ondansetron, polyethylene glycol, acetaminophen **OR** acetaminophen, benzonatate, sodium chloride flush, sodium chloride, glucose, dextrose bolus **OR** dextrose bolus, glucagon (rDNA), dextrose    Objective:   Vitals:   T-max:  Patient Vitals for the past 8 hrs:   BP Temp Temp src Pulse Resp SpO2 Weight   08/08/22 0410 (!) 144/79 97.8 °F (36.6 °C) Oral 60 18 97 % 186 lb 11.7 oz (84.7 kg)       Intake/Output Summary (Last 24 hours) at 8/8/2022 0941  Last data filed at 8/8/2022 0600  Gross per 24 hour   Intake 990 ml   Output 2270 ml   Net INR in the last 72 hours. Lactate: No results for input(s): LACTATE in the last 72 hours. Cultures:  -----------------------------------------------------------------  RAD:   XR CHEST PORTABLE   Final Result   1. Right lower lung consolidation with focal lucency at the right costophrenic angle suspicious for a small loculated pneumothorax. This could be evaluated further with chest CT if clinically warranted. Findings called to the patient's nurse at the time    of dictation. XR CHEST PORTABLE   Final Result      Mild bilateral interstitial opacities and bibasilar atelectasis, increased compared to prior. US RENAL COMPLETE   Final Result      Negative renal ultrasound. VL Extremity Venous Right   Final Result      CT CHEST WO CONTRAST   Final Result      Bilateral patchy parenchymal infiltrates are seen throughout both lungs, left greater than right. The appearance is suggestive of atypical pneumonia. Recommend follow-up imaging to verify resolution and exclude underlying nodularity. Coronary calcification. Thyromegaly with calcifications noted in the lower neck. XR CHEST PORTABLE   Final Result   1. No acute disease. 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, type 2 diabetes mellitus, hyperlipidemia, CKD stage III, HLD who presented to the ED with complaints of a fever. Sepsis 2/2 suspected COVID PNA  Patient met SIRS criterion on arrival. Febrile and hypotensive, lactic acid normal.  Patient with symptoms of cough, weakness, body aches, concerning for a suspected respiratory infection. Given negative CXR findings, appears the COVID infection is confined to URT.  CT Chest is suggestive of atypical pneumonia.  - S/p sepsis bolus in ED  - Bcx no growth; procalcitonin mildly elevated at 0.3  - Rocephin and azithromycin  - Decadron  - Barticinib     CKD IV - Cr 3.1 today (8/8) down from 4.0  Patient follows up with [facility] nephrology outpatient. - monitor Cr, UOP  - avoid nephrotoxin agents  - UOP: 2870over the last day (8/7-8/8)     Hyperglycemia  - Glucose: 137 (8/8)  - Lantus 17  - HDSS     Sick Sinus Syndrome  Pt has persistent bradycardia but asymptomatic since arrival and has dipper to below 30 overnight (8/2); pt has had bradycardia with hypotension that is not responding to fluids; tele reveals sinus obed with intermittent junctional ventricular escape with sinus pauses  - Echo: normal findings and LV EF 45-50%  - Pacemaker placed; Atrial Revision complete  - HR steady at 60 bpm  - EP and Cardiology have cleared pt for d/c on their end     Chronic Medical Conditions:     3. T2DM: Patient on 17 units of Lantus nightly. Starting patient on high-dose siding scale insulin. POCT before meals and at bedtime. 4. Hyperlipidemia: On Lipitor. 5. Neuropathic pain: Continued home Lyrica 150 mg BID. 6. TE fistula s/p treatment of metastatic osteosarcoma. No aspiration events reported. 7.  Metastatic Osteosarcoma:  Patient follows up with Dr. Amena Trujillo from [facility]. Previous treatment:  Adriamycin/cisplatin with intermitted high dose methotrexate followed by amputation. Max/CDDP Q21 days x 4 post -op 2/3/15 - 4/7/2015  Current therapy:  Gemcitabine D1,8 + Docetaxel D8 Q21D. Total cycle 21, on cycle 8.  C1D1 04/17/2019     Code Status: Full  FEN: Full  PPX: subq heparin  DISPO: IVAN Bhandari DO, PGY-1  08/08/22  9:41 AM    This patient has been staffed and discussed with Jessica Thornton MD.

## 2022-08-08 NOTE — CARE COORDINATION
Case Management Assessment            Discharge Note                    Date / Time of Note: 8/8/2022 10:01 AM                  Discharge Note Completed by: Asuncion Fonseca RN    Patient Name: Kendal Henderson   YOB: 1950  Diagnosis: Fever and chills [R50.9]  Septicemia ([facility] [address] [A41.9]  Sepsis ([facility] [address] [address] [A41.9]   Date / Time: 7/30/2022  6:27 PM    Current PCP: Dorie Burciaga [facility] patient: No    Hospitalization in the last 30 days: No    Advance Directives:  Code Status: Full Code  [place] DNR form completed and on chart: Not Indicated    Financial:  Payor: Jock Pride / Plan: Jock Pride / Product Type: *No Product type* /      [facility] [address]  Phone: [phone] Fax: [phone]      Assistance purchasing medications?: Potential Assistance Purchasing Medications: No  Assistance provided by Case Management: None at this time    Does patient want to participate in local refill/ meds to beds program?: No    Meds To Beds General Rules:  1. Can ONLY be done Monday- Friday between 8:30am-5pm  2. Prescription(s) must be in pharmacy by 3pm to be filled same day  3. Copy of patient's insurance/ prescription drug card and patient face sheet must be sent along with the prescription(s)  4. Cost of Rx cannot be added to hospital bill. If financial assistance is needed, please contact unit  or ;  or  CANNOT provide pharmacy voucher for patients co-pays  5.  Patients can then  the prescription on their way out of the hospital at discharge, or pharmacy can deliver to the bedside if staff is available. (payment due at time of pick-up or delivery - cash, check, or card accepted)     Able to afford home medications/ co-pay costs: Yes    ADLS:  Current PT AM-PAC Score: 10 /24  Current OT AM-PAC Score: 14 /24      DISCHARGE Disposition: Inpatient Rehab: [facility] Phone: [phone] Fax: [phone]    LOC at discharge: Not Applicable  LLOYD Completed: Yes    Notification completed in HENS/PAS?:  Not Applicable    IMM Completed:   Not Indicated    Transportation:  Transportation PLAN for discharge: EMS transportation   Mode of Transport: Ambulance stretcher - BLS  Reason for medical transport: Special handling en route- isolation precautions  Name of [address]: [address]  Phone: [phone]  Time of Transport: 2-3    Transport form completed: Yes    Home Care:  [address] ordered at discharge: Not [address]: Not Applicable  Orders faxed: No    Durable Medical Equipment:  DME Provider: n/a  Equipment obtained during hospitalization: defer    Home Oxygen and Respiratory Equipment:  Oxygen needed at discharge?: Not [address]: Not Applicable  Portable tank available for discharge?: Not Indicated    Dialysis:  Dialysis patient: No    Dialysis Center:  Not Applicable    Hospice Services:  Location: Not Applicable  Agency: Not Applicable    Consents signed: Not Indicated    Referrals made at [facility] for outpatient continued care:  Not Applicable    Additional CM Notes: Patient will discharge to [facility]. Orders faxed to [phone], nurse to call report to [phone]  Patient scheduled for transport at 1400 via [facility] Care. Patient aware and agreeable to plan. The Plan for Transition of Care is related to the following treatment goals of Fever and chills [R50.9]  Septicemia ([address] [A41.9]  Sepsis ([facility] [A41.9]    The Patient and/or patient representative Lizandro Walton and his family were provided with a choice of provider and agrees with the discharge plan Yes    Freedom of choice list was provided with basic dialogue that supports the patient's individualized plan of care/goals and shares the quality data associated with the providers.  Yes    Care Transitions patient: Lian Boyle RN  [facility]  Case Management Department  Ph: [phone]  Fax: [phone]

## 2022-08-08 NOTE — PLAN OF CARE
Problem: Discharge Planning  Goal: Discharge to home or other facility with appropriate resources  Outcome: Progressing  Flowsheets (Taken 8/8/2022 0000)  Discharge to home or other facility with appropriate resources:   Identify barriers to discharge with patient and caregiver   Arrange for needed discharge resources and transportation as appropriate   Identify discharge learning needs (meds, wound care, etc)     Problem: Skin/Tissue Integrity  Goal: Absence of new skin breakdown  Description: 1. Monitor for areas of redness and/or skin breakdown  2. Assess vascular access sites hourly  3. Every 4-6 hours minimum:  Change oxygen saturation probe site  4. Every 4-6 hours:  If on nasal continuous positive airway pressure, respiratory therapy assess nares and determine need for appliance change or resting period.   8/8/2022 0110 by Ronn Perez RN  Outcome: Progressing     Problem: Safety - Adult  Goal: Free from fall injury  8/8/2022 0110 by Ronn Perez RN  Outcome: Progressing  Flowsheets (Taken 8/7/2022 1950)  Free From Fall Injury: Instruct family/caregiver on patient safety     Problem: Pain  Goal: Verbalizes/displays adequate comfort level or baseline comfort level  8/8/2022 0110 by Ronn Perez RN  Outcome: Progressing  Flowsheets (Taken 8/7/2022 1950)  Verbalizes/displays adequate comfort level or baseline comfort level:   Encourage patient to monitor pain and request assistance   Assess pain using appropriate pain scale   Administer analgesics based on type and severity of pain and evaluate response    Problem: ABCDS Injury Assessment  Goal: Absence of physical injury  8/8/2022 0110 by Ronn Perez RN  Outcome: Progressing  Flowsheets (Taken 8/7/2022 1950)  Absence of Physical Injury: Implement safety measures based on patient assessment     Problem: Neurosensory - Adult  Goal: Achieves maximal functionality and self care  Outcome: Progressing  Flowsheets (Taken 8/7/2022 1950)  Achieves maximal functionality and self care:   Monitor swallowing and airway patency with patient fatigue and changes in neurological status   Encourage and assist patient to increase activity and self care with guidance from physical therapy/occupational therapy   Encourage visually impaired, hearing impaired and aphasic patients to use assistive/communication devices     Problem: Respiratory - Adult  Goal: Achieves optimal ventilation and oxygenation  Outcome: Progressing     Problem: Skin/Tissue Integrity - Adult  Goal: Skin integrity remains intact  Outcome: Progressing  Flowsheets (Taken 8/7/2022 1950)  Skin Integrity Remains Intact: Monitor for areas of redness and/or skin breakdown     Problem: Skin/Tissue Integrity - Adult  Goal: Incisions, wounds, or drain sites healing without S/S of infection  8/8/2022 0110 by Alice Stratton RN  Outcome: Progressing  Flowsheets (Taken 8/7/2022 1950)  Incisions, Wounds, or Drain Sites Healing Without Sign and Symptoms of Infection: ADMISSION and DAILY: Assess and document risk factors for pressure ulcer development

## 2022-08-08 NOTE — DISCHARGE INSTR - COC
Continuity of Care Form    Patient Name: Terrell Post   :    MRN:  [id number]    Admit date:  2022  Discharge date:  2022    Code Status Order: Full Code   Advance Directives:     Admitting Physician:  Ariel Whitney MD  PCP: Marissa Qureshi [facility]    Discharging Nurse: [facility] [place] Unit/Room#: 5654/5971-45  Discharging Unit Phone Number: ***    Emergency Contact:   Extended Emergency Contact Information  Primary Emergency Contact: [address] [facility] of [address] [place] [address] Phone: [phone]  Mobile Phone: [phone]  Relation: Child  Secondary Emergency Contact: [address] of [address] [place] [address] Phone: [phone]  Mobile Phone: [phone]  Relation: Domestic Partner    Past Surgical History:  Past Surgical History:   Procedure Laterality Date    ABDOMEN SURGERY      CHOLECYSTECTOMY      CHOLECYSTECTOMY      Laparoscopic    COLONOSCOPY      COLONOSCOPY      X3. Last one 2014    ENDOSCOPY, COLON, DIAGNOSTIC      Upper GI prior to Cholecystectomy    FOREIGN BODY REMOVAL      LEG AMPUTATION BELOW KNEE Left 14    LEG SURGERY Left 14    biopsy left lower leg    LIPOMA RESECTION      OTHER SURGICAL HISTORY Left 10/22/2014    LEFT SUBCLAVIAN PORT REMOVAL              OTHER SURGICAL HISTORY Right 10/27/14    INSERTION OF SINGLE LUMEN PORT A CATH    TONSILLECTOMY      TONSILLECTOMY      childhood    TUNNELED VENOUS PORT PLACEMENT Right     Power Port       Immunization History: There is no immunization history on file for this patient.     Active Problems:  Patient Active Problem List   Diagnosis Code    Chondroblastic osteosarcoma ([address] C41.9    Micturition syncope R55    Fever and chills R50.9    Diabetes ([address] E11.9    Left BKA Z89.519    Port-A-Cath in place Z95.828    Cellulitis L03.90    Stage 3b chronic kidney disease ([address] N18.32    Sepsis ([address] A41.9    SSS (sick sinus syndrome) ([address] I49.5    Bradycardia R00.1 Septicemia ([facility] A41.9    COVID-19 U07.1    Pacemaker Z95.0       Isolation/Infection:   Isolation            Droplet Plus          Patient Infection Status       Infection Onset Added Last Indicated Last Indicated By Review Planned Expiration Resolved Resolved By    COVID-19 07/30/22 07/30/22 07/30/22 COVID-19 & Influenza Combo 08/09/22 08/13/22      Resolved    C-diff Rule Out 08/01/22 08/01/22 08/01/22 Clostridium difficile toxin/antigen (Ordered)   08/01/22 Rule-Out Test Resulted    COVID-19 (Rule Out) 07/30/22 07/30/22 07/30/22 COVID-19 & Influenza Combo (Ordered)   07/30/22 Rule-Out Test Resulted            Nurse Assessment:  Last Vital Signs: /71   Pulse 58   Temp 97.7 °F (36.5 °C) (Oral)   Resp 16   Ht 6' (1.829 m)   Wt 186 lb 11.7 oz (84.7 kg)   SpO2 92%   BMI 25.33 kg/m²     Last documented pain score (0-10 scale): Pain Level: 0  Last Weight:   Wt Readings from Last 1 Encounters:   08/08/22 186 lb 11.7 oz (84.7 kg)     Mental Status:  oriented and alert    IV Access:  - non-accessed port site    Nursing Mobility/ADLs:  Walking   Assisted  Transfer  Assisted  Bathing  Assisted  Dressing  Assisted  Toileting  Assisted  Feeding  Independent  Med Admin  Assisted  Med Delivery   whole    Wound Care Documentation and Therapy:  Incision 08/18/14 Leg Left; Lower (Active)   Number of days: 2911       Incision 12/08/14 Leg Left; Lower (Active)   Number of days: 2800        Elimination:  Continence: Bowel: Yes  Bladder: Yes  Urinary Catheter: None   Colostomy/Ileostomy/Ileal Conduit: No       Date of Last BM: 8/7/2022    Intake/Output Summary (Last 24 hours) at 8/8/2022 1112  Last data filed at 8/8/2022 0600  Gross per 24 hour   Intake 770 ml   Output 2270 ml   Net -1500 ml     I/O last 3 completed shifts: In: 1120 [P.O.:1100; I.V.:20]  Out: 4945 [Urine:4945]    Safety Concerns:      At Risk for Falls    Impairments/Disabilities:      None    Nutrition Therapy:  Current Nutrition Therapy:   - Oral Diet: 50Herminia Rodriguez LLOYD Oral ONLP:[id number]}    Routes of Feeding: Oral  Liquids: Thin Liquids  Daily Fluid Restriction: no  Last Modified Barium Swallow with Video (Video Swallowing Test): not done    Treatments at the Time of Hospital Discharge:   Respiratory Treatments: ***  Oxygen Therapy:  {Therapy; copd oxygen:05611}  Ventilator:    {MH CC Vent VHPK:770982651}    Rehab Therapies: {THERAPEUTIC INTERVENTION:0214560765}  Weight Bearing Status/Restrictions: [address] CC Weight Bearin}  Other Medical Equipment (for information only, NOT a DME order):  {EQUIPMENT:394330463}  Other Treatments: ***    Patient's personal belongings (please select all that are sent with patient):  {P DME Belongings:499689699}    RN SIGNATURE:  {Esignature:043008523}    CASE MANAGEMENT/SOCIAL WORK SECTION    Inpatient Status Date: ***    Readmission Risk Assessment Score:  Readmission Risk              Risk of Unplanned Readmission:  [id number]           Discharging to Facility/ Agency   Name:  [facility]  [address] [place] [address]  Phone: [phone]  Fax: [phone]        / signature: Electronically signed by Moy Herndon RN on 22 at 11:15 AM EDT    PHYSICIAN SECTION    Prognosis: Fair    Condition at Discharge: Stable    Rehab Potential (if transferring to Rehab): Fair    Recommended Labs or Other Treatments After Discharge:   PT OT   Renal function panel in 2 days.      Physician Certification: I certify the above information and transfer of Owen Mendes  is necessary for the continuing treatment of the diagnosis listed and that he requires Acute Rehab   Update Admission H&P: No change in H&P    PHYSICIAN SIGNATURE:  Electronically signed by Fabby English MD on 22 at 11:38 AM EDT

## 2022-08-09 NOTE — PROGRESS NOTES
Discharged per ambulance with belongings to rehab facility. Pt denies pain or needs. No distress noted. Report given to RN at faciltiy.

## 2022-08-11 ENCOUNTER — TELEPHONE (OUTPATIENT)
Dept: CARDIOLOGY CLINIC | Age: 72
End: 2022-08-11

## 2022-08-11 NOTE — TELEPHONE ENCOUNTER
Pt was scheduled for device and wound check today. [facility] called to cancel because Pt is covid positive. Next appt for Pt is 9/8/22 w/WS and device check. Number at [facility] is [phone] if questions.

## 2022-08-11 NOTE — TELEPHONE ENCOUNTER
----- Message from MARIE Wells CNP sent at 8/11/2022 12:46 PM EDT -----  Pt tested positive for COVID 7/30/22. He can come in for visit (needs 1 week wound/ device follow up post ppm) 2 weeks after testing positive. See if he is able to come in 8/15/22 for device check/ wound check with Torie Glez. Thanks.    ----- Message -----  From: Elsa Cifuentes RN  Sent: 8/11/2022  10:59 AM EDT  To: MARIE Wells CNP, Pt was scheduled for device and wound check today. [facility] called to cancel because Pt is covid positive. Next appt for Pt is 9/8/22 w/WS and device check. Number at [facility] is [phone] if questions. Is it ok to keep 9/8 appt or do you want to see pt sooner? Thank you.

## 2022-08-11 NOTE — TELEPHONE ENCOUNTER
Spoke w/[facility] phone #  [phone] - confirmed per Niurka Heading device ck/wound ck 8/15 at 11 am. Rehab arranging transportation. Will keep 9/8 appt and cancel per device tech at time of appt 8/15 if not needed.

## 2022-08-15 ENCOUNTER — NURSE ONLY (OUTPATIENT)
Dept: CARDIOLOGY CLINIC | Age: 72
End: 2022-08-15
Payer: OTHER GOVERNMENT

## 2022-08-15 DIAGNOSIS — R00.1 BRADYCARDIA: ICD-10-CM

## 2022-08-15 DIAGNOSIS — Z95.0 PACEMAKER: Primary | ICD-10-CM

## 2022-08-15 DIAGNOSIS — I49.5 SSS (SICK SINUS SYNDROME) (HCC): ICD-10-CM

## 2022-08-15 PROCEDURE — 93280 PM DEVICE PROGR EVAL DUAL: CPT | Performed by: INTERNAL MEDICINE

## 2022-08-15 NOTE — PROGRESS NOTES
Patient comes in for a 1 week wound check and programming evaluation on their Medtronic 8. 3.2022. Atrial lead revision 8.5.2022//UL    First initial check. See telephone encounter [phone]. Outer dressing was removed from left chest device site prior to today's visit. Incision is well approximated,CDI, SS intact. Pt informed to call office if he develops any fever, increased swelling, redness or drainage from site. Pt voiced an understanding. Home monitor paired at implant. Discussed home monitoring, frequencies and need for manual transmissions. All sensing and pacing parameters are within normal range. Battery life 11.1 years  AP 77%.  32.4%. AT/AF burden 0%  .6/hr  1 Nsvt on 8.8.2022 x <03 secs. Patient remains on asa 81 mg. Additional carelink alerts turned On. Please see interrogation for more detail. AVS and updated discharge instructs provided to pt/SNF today. Patient will follow up as scheduled. We will continue to monitor remotely.

## 2022-08-15 NOTE — PATIENT INSTRUCTIONS
[address] office at:  [phone]  Taya Foster CNP  Alla Shelter DORA Rey, Cardiac Device Tech      Permanent Pacemaker [facility])   Implantable Cardioverter Defibrillator (ICD)  Care Instructions      Your permanent pacemaker/ICD is a sophisticated piece of electronic equipment and both the wound and the device need special care during your recovery period and into the future. Please use these instructions as guide. If you have any questions please call the office. Wound Care:   Keep the incision site clean and dry. May get incision wet starting today, 8.15.2022. SHOWERS ONLY. Do not submerge in bodies of water. Do not remove the steri strips (the pieces of \"tape\" that cover the incision). These will eventually fall off on their own. DO NOT apply soaps, ointments,  lotion or powder to the incision site. Wear comfortable clothes that will not rub on the incision site. Avoid bra straps or suspenders. At your one week follow up appointment your bandage will be removed and you will be given further instruction on care of the site at that time. When to contact the office:  Changes in how your incision site is healing including:  Increase in swelling and/or tenderness   Increase in redness at the incision site or surrounding the device  Drainage from the incision  If you experience:  Lightheadedness, dizziness or passing out  Increase in fatigue or shortness of breath  Fever (temperature greater than 100 degrees F)  Chills   Prolonged hiccups or chest discomfort  If you have any questions     Activity:   Do not raise your elbow above shoulder level or reach behind your back for 4 weeks after your procedure. DO NOT lift more than 8 pounds with your affected arm for 4 weeks after your procedure. (A gallon of milk is 8 pounds). Avoid excessive pushing, pulling or twisting.   DO NOT drive until instructed it is OK by your provider. Wear a sling only as a reminder to limit the activity of your affected arm. It is important to remember to still use your affected arm to maintain mobility but no excessive movements. No sports activities until approved by your provider. Precautions: You may use common household appliances, if they are in good repair (even microwaves). You should not lean against them while they are on. When using cellular and cordless phones, keep them at least 6 inches away from your device. (Use on the opposite side of your device.)  Do not hold or carry strong magnets. You may NOT perform welding. Airport and security screening devices should be avoided. You will need to show your ID card and ask for a hand search. Always tell your health care professional (including the dentist) that you have a device and show your ID card. ID Card: You will be provided a temporary ID card at the time of your hospital discharge and a permanent ID card in approximately 8 weeks. It is very important that you carry this ID card with you at ALL times. Consider purchasing a medical alert bracelet identifying your device and . Please refer to your pacemaker booklet for more information about your device. Follow Up Care: The pacemaker/ICD DOES NOT replace your current medications. It is important for you to continue your medications as prescribed. You will be given your first follow up appointment approximately one week after your procedure to check your wound and device. You will then have a follow up approximately one month after that appointment and then every 3-6 months thereafter. If you are unsure of your follow up appointment [address] FOLLOW-UP APPOINTMENTS    Follow-up with NP at 1 month for a wound and device check. Follow-up with NP at 3 months for an appointment and device check.        Maxi Mani, [address] Phone: [phone]. If you are unable to make this appointment, please call to reschedule.

## 2022-09-08 ENCOUNTER — OFFICE VISIT (OUTPATIENT)
Dept: CARDIOLOGY CLINIC | Age: 72
End: 2022-09-08
Payer: OTHER GOVERNMENT

## 2022-09-08 ENCOUNTER — NURSE ONLY (OUTPATIENT)
Dept: CARDIOLOGY CLINIC | Age: 72
End: 2022-09-08
Payer: OTHER GOVERNMENT

## 2022-09-08 VITALS
SYSTOLIC BLOOD PRESSURE: 90 MMHG | BODY MASS INDEX: 27.07 KG/M2 | WEIGHT: 199.6 LBS | DIASTOLIC BLOOD PRESSURE: 60 MMHG | HEART RATE: 79 BPM

## 2022-09-08 DIAGNOSIS — I49.5 SSS (SICK SINUS SYNDROME) (HCC): ICD-10-CM

## 2022-09-08 DIAGNOSIS — R00.1 BRADYCARDIA: ICD-10-CM

## 2022-09-08 DIAGNOSIS — I49.5 SSS (SICK SINUS SYNDROME) (HCC): Primary | ICD-10-CM

## 2022-09-08 DIAGNOSIS — Z95.0 PACEMAKER: ICD-10-CM

## 2022-09-08 DIAGNOSIS — Z95.0 PACEMAKER: Primary | ICD-10-CM

## 2022-09-08 PROCEDURE — 1123F ACP DISCUSS/DSCN MKR DOCD: CPT | Performed by: NURSE PRACTITIONER

## 2022-09-08 PROCEDURE — 93280 PM DEVICE PROGR EVAL DUAL: CPT | Performed by: INTERNAL MEDICINE

## 2022-09-08 PROCEDURE — 99214 OFFICE O/P EST MOD 30 MIN: CPT | Performed by: NURSE PRACTITIONER

## 2022-09-08 RX ORDER — FUROSEMIDE 40 MG/1
40 TABLET ORAL
COMMUNITY
Start: 2022-08-24

## 2022-09-08 NOTE — PROGRESS NOTES
[address]   Electrophysiology  Office Visit  Date: 9/8/2022    Chief Complaint   Patient presents with    Bradycardia    Device Check         Cardiac HX: Doug Peoples is a 66 y.o. 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 Rubin Gonzalez), CXR showed atrial lead dislodgement, s/p atrial lead revision (8/6/22, Dr. Rubin Gonzalez)    Interval History/HPI: Patient is here for follow-up for SSS, PPM management. She was recently admitted to the hospital after he suffered a fall at home. During stay he was noted to test positive for COVID. He had severe bradycardia and hypotension and was noted to have SSS on telemetry. He underwent dual-chamber MDT PPM with Dr. Rubin Gonzalez. Post procedure chest x-ray showed atrial lead dislodgment and then patient underwent atrial lead revision. His device check today shows  3.9%, AP 30.7%, 2 episodes NSVT, possible appearing to be PAT, all other parameters stable, STEVEN 12.8 years. Pt denies palpitations, heart racing, dizziness, lightheadedness. No CP, SOB, PND, orthopnea, RLE edema. BP well controlled.       Home medications:   Current Outpatient Medications on File Prior to Visit   Medication Sig Dispense Refill    furosemide (LASIX) 40 MG tablet 40 mg      pantoprazole (PROTONIX) 40 MG tablet Take 1 tablet by mouth every morning (before breakfast) 30 tablet 3    melatonin 10 MG CAPS capsule Take 10 mg by mouth nightly as needed      insulin glargine (LANTUS;BASAGLAR) 100 UNIT/ML injection pen Inject 19 Units into the skin every morning      diphenhydrAMINE-APAP, sleep, (TYLENOL PM EXTRA STRENGTH)  MG tablet Take 1 tablet by mouth nightly as needed for hematemesis. Gastrointestinal: No blood in stools. Genitourinary: No hematuria. Musculoskeletal: Yes gait disturbance,    Integumentary: No rash or pruritis. Psychiatric: No anxiety, or depression. Hem/Lymph: No abnormal bruising or bleeding. Physical Examination:  Vitals:    09/08/22 1501   BP: 90/60   Pulse: 79         Wt Readings from Last 3 Encounters:   09/08/22 199 lb 9.6 oz (90.5 kg)   08/08/22 186 lb 11.7 oz (84.7 kg)   06/24/22 201 lb 11.5 oz (91.5 kg)       Constitutional: Oriented. No distress. Head: Normocephalic and atraumatic. Neck: Neck supple. No rigidity. No JVD present. Cardiovascular: Normal rate, regular rhythm, S1&S2. Pulmonary/Chest: Bilateral respiratory sounds. No wheezes, No rhonchi. Musculoskeletal: No tenderness. No edema    Neurological: Alert and oriented. Cranial nerve appears intact, No Gross deficit   Skin: Skin is warm and dry. No rash noted. Psychiatric: Has a normal mood, affect and behavior     Labs:  Reviewed. No results for input(s): NA, K, CL, CO2, PHOS, BUN, CREATININE, CA in the last 72 hours. Invalid input(s):  TSH  No results for input(s): WBC, HGB, HCT, MCV, PLT in the last 72 hours. Lab Results   Component Value Date/Time    TROPONINI <0.01 07/30/2022 07:27 PM     No results found for: BNP  Lab Results   Component Value Date/Time    PROTIME 13.9 08/05/2022 05:23 AM    PROTIME 15.4 08/01/2022 08:10 PM    PROTIME 13.4 08/07/2019 11:32 AM    INR 1.07 08/05/2022 05:23 AM    INR 1.23 08/01/2022 08:10 PM    INR 1.18 08/07/2019 11:32 AM     Lab Results   Component Value Date/Time    CHOL 88 08/03/2022 04:29 AM    HDL 30 08/03/2022 04:29 AM    TRIG 147 08/03/2022 04:29 AM       ECG: Personally reviewed: NSR, HR 79, , , QTc 409    ECHO:  8/3/22  Summary   Left ventricular cavity size is normal. Normal left ventricular wall   thickness.  Overall left ventricular systolic function appears mildly reduced   with an ejection fraction of 45-50%. Indeterminate diastolic function. Mild   to moderate tricuspid regurgitation. Estimated pulmonary artery systolic   pressure is at 54 mmHg assuming a right atrial pressure of 15 mmHg. 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 SSS (sick sinus syndrome) ([address]    2. Bradycardia    3. Pacemaker      SSS  - s/p dual [address] MDT PPM (8/3/2022, Dr. Rahel Chua)  - S/p atrial lead revision (8/6/22)  - Left chest incision well healed  - Device check shows  3.9%, AP 30.7%, 2 episodes NSVT, possible appearing to be PAT, all other parameters stable, STEVEN 12.8 years. - If NSVT seen on monitor again, may consider lexiscan at next visit  - Reviewed post procedure instructions  - F/u 3 months  - ECG ordered and results personally reviewed     All questions and concerns were addressed to the patient/family. Alternatives to my treatment were discussed. The note was completed using EMR. Every effort was made to ensure accuracy; however, inadvertent computerized transcription errors may be present. Patient received education regarding their diagnosis, treatment and medications while in the office today.       fEe Gould, [address]

## 2022-09-08 NOTE — PROGRESS NOTES
Patient comes in for a 1 month programming evaluation on their Medtronic dual chamber pacemaker. Incision appears to have healed nicely. SS removed today. Home monitor is paired and communicating. All sensing and pacing parameters are within normal range. Battery life 12.8 years  AP 30.7%.  3.9%. AT/AF burden 0%  PVC 77.1/hr  Since 8.15.2022:  2 VT-NS. Recent 9.6.2022 x 01 secs. Patient remains on asa  81 mg. No changes need to be made at this time. Please see interrogation for more detail. Patient will see NPWS today and follow up in 3 months in office or remotely.

## 2022-10-01 ENCOUNTER — NURSE ONLY (OUTPATIENT)
Dept: CARDIOLOGY CLINIC | Age: 72
End: 2022-10-01

## 2022-10-03 NOTE — PROGRESS NOTES
[facility] remote transmission received from patient's dual chamber pacemaker monitor at home d/t High RV thresholds measured for at least 3 days starting on 10.01.22. Transmission shows normal RA sensing and pacing function. Current EGM shows what appears to be undersensed Rwaves followed by inappropriate RV pacing (possibly causing the out of range measurements noted). RV threshold trend previously stable w recent High (>2.5V) measurements noted. Rwave trend stable and shows hx low Rwaves w most recent measurement 1.3mV (sensitivity programmed 0.9mV). Last OV threshold 09.08.22 measured 0.75V/0.4ms. Noted AT. Ap 26.5%   26.4% (MVP On)  PVCs 64.8/hr    EP physician will review. See interrogation under cardiology tab in the [facility] [address] field for more details. Will continue to monitor remotely.

## 2022-10-05 ENCOUNTER — TELEPHONE (OUTPATIENT)
Dept: CARDIOLOGY CLINIC | Age: 72
End: 2022-10-05

## 2022-10-05 NOTE — TELEPHONE ENCOUNTER
----- Message from [employer] sent at 10/3/2022  2:19 PM EDT -----  Please review for possible undersensed Rwaves.

## 2022-10-05 NOTE — TELEPHONE ENCOUNTER
I have made a note on the upcoming device appointment to review and possible increase RV sensitivity.  Thanks

## 2022-10-26 NOTE — RESULT ENCOUNTER NOTE
Unremarkable device check.    Continue to monitor    Miguel Napoles MD   Cardiac Electrophysiology  16 [facility] [phone]

## 2022-10-26 NOTE — RESULT ENCOUNTER NOTE
Unremarkable device check.    Continue to monitor    Essence Clinton MD   Cardiac Electrophysiology  16 [facility]  Office [phone]

## 2022-10-26 NOTE — RESULT ENCOUNTER NOTE
Unremarkable device check.    Continue to monitor    Ishan Gonzalez MD   Cardiac Electrophysiology  16 [facility]  Office [phone]

## 2022-10-26 NOTE — RESULT ENCOUNTER NOTE
Unremarkable device check.    Continue to monitor    Ishan Gonzalez MD   Cardiac Electrophysiology  16 [facility]  Office [phone]

## 2022-10-26 NOTE — RESULT ENCOUNTER NOTE
Unremarkable device check.    Continue to monitor    Brooks Joel MD   Cardiac Electrophysiology  16 [facility]  Office [phone]

## 2022-11-07 ENCOUNTER — NURSE ONLY (OUTPATIENT)
Dept: CARDIOLOGY CLINIC | Age: 72
End: 2022-11-07
Payer: OTHER GOVERNMENT

## 2022-11-07 DIAGNOSIS — I49.5 SSS (SICK SINUS SYNDROME) (HCC): ICD-10-CM

## 2022-11-07 DIAGNOSIS — Z95.0 PACEMAKER: Primary | ICD-10-CM

## 2022-11-07 PROCEDURE — 93294 REM INTERROG EVL PM/LDLS PM: CPT | Performed by: INTERNAL MEDICINE

## 2022-11-07 PROCEDURE — 93296 REM INTERROG EVL PM/IDS: CPT | Performed by: INTERNAL MEDICINE

## 2022-11-16 NOTE — PROGRESS NOTES
[facility]   Electrophysiology  Office Visit  Date: 11/21/2022    Chief Complaint   Patient presents with    Device Check    Bradycardia       Cardiac HX: Maci Yu is a 74 y.o. 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 Clare Grant), CXR showed atrial lead dislodgement, s/p atrial lead revision (8/6/22, Dr. Clare Grant)    Interval History/HPI: Patient is here for follow-up for SSS, PPM management. Device check today shows  13.4%, AP 43.9%, 5 episodes NSVT, some appearing to be PAT/SVT, no other arrhythmias notes, STEVEN 12.4 years. Pt denies palpitations, heart racing, dizziness, lightheadedness. No CP, SOB, PND, orthopnea, BLE edema. /60. Weight up 7lbs today however he is wearing his prosthetic leg and he was not wearing it at prior visit,    Home medications:   Current Outpatient Medications on File Prior to Visit   Medication Sig Dispense Refill    tamsulosin (FLOMAX) 0.4 MG capsule Take 0.4 mg by mouth daily      furosemide (LASIX) 40 MG tablet 40 mg      pantoprazole (PROTONIX) 40 MG tablet Take 1 tablet by mouth every morning (before breakfast) 30 tablet 3    melatonin 10 MG CAPS capsule Take 10 mg by mouth nightly as needed      insulin glargine (LANTUS;BASAGLAR) 100 UNIT/ML injection pen Inject 19 Units into the skin every morning      diphenhydrAMINE-APAP, sleep, (TYLENOL PM EXTRA STRENGTH)  MG tablet Take 1 tablet by mouth nightly as needed for Sleep      atorvastatin (LIPITOR) 20 MG tablet Take 20 mg by mouth daily      pregabalin (LYRICA) 150 MG capsule Take 150 mg by mouth 2 times daily.       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 Last one February 2014    ENDOSCOPY, COLON, DIAGNOSTIC      Upper GI prior to Cholecystectomy    FOREIGN BODY REMOVAL      LEG AMPUTATION BELOW KNEE Left 12/08/14    LEG SURGERY Left 8/18/14    biopsy left lower leg    LIPOMA RESECTION  2013    OTHER SURGICAL HISTORY Left 10/22/2014    LEFT SUBCLAVIAN PORT REMOVAL              OTHER SURGICAL HISTORY Right 10/27/14    INSERTION OF SINGLE LUMEN PORT A CATH    TONSILLECTOMY      TONSILLECTOMY      childhood    TUNNELED VENOUS PORT PLACEMENT Right     Power Port       Family History:  Reviewed. family history includes Emphysema in his mother; Other in his brother. Review of System:    Constitutional: No fevers, chills. Cardiovascular: No for chest pain, No for dyspnea on exertion, No for palpitations or No for loss of consciousness. No cough, hemoptysis, No for pleuritic pain, or phlebitis. Respiratory: No for cough or wheezing. No hematemesis. Gastrointestinal: No blood in stools. Genitourinary: No hematuria. Musculoskeletal: Yes gait disturbance,    Integumentary: No rash or pruritis. Psychiatric: No anxiety, or depression.   Hem/Lymph: No abnormal bruising or bleeding. Physical Examination:  Vitals:    11/21/22 1534   BP: 100/60   Pulse: 76           Wt Readings from Last 3 Encounters:   11/21/22 206 lb (93.4 kg)   09/08/22 199 lb 9.6 oz (90.5 kg)   08/08/22 186 lb 11.7 oz (84.7 kg)       Constitutional: Oriented. No distress. Head: Normocephalic and atraumatic. Neck: Neck supple. No rigidity. No JVD present. Cardiovascular: Normal rate, regular rhythm, S1&S2. Pulmonary/Chest: Bilateral respiratory sounds. No wheezes, No rhonchi. Musculoskeletal: No tenderness. No edema    Neurological: Alert and oriented. Cranial nerve appears intact, No Gross deficit   Skin: Skin is warm and dry. No rash noted. Psychiatric: Has a normal mood, affect and behavior     Labs:  Reviewed. No results for input(s): NA, K, CL, CO2, PHOS, BUN, CREATININE, CA in the last 72 hours. Invalid input(s):  TSH  No results for input(s): WBC, HGB, HCT, MCV, PLT in the last 72 hours. Lab Results   Component Value Date/Time    TROPONINI <0.01 07/30/2022 07:27 PM     No results found for: BNP  Lab Results   Component Value Date/Time    PROTIME 13.9 08/05/2022 05:23 AM    PROTIME 15.4 08/01/2022 08:10 PM    PROTIME 13.4 08/07/2019 11:32 AM    INR 1.07 08/05/2022 05:23 AM    INR 1.23 08/01/2022 08:10 PM    INR 1.18 08/07/2019 11:32 AM     Lab Results   Component Value Date/Time    CHOL 88 08/03/2022 04:29 AM    HDL 30 08/03/2022 04:29 AM    TRIG 147 08/03/2022 04:29 AM       ECG: Personally reviewed: AP    ECHO:  8/3/22  Summary   Left ventricular cavity size is normal. Normal left ventricular wall   thickness. Overall left ventricular systolic function appears mildly reduced   with an ejection fraction of 45-50%. Indeterminate diastolic function. Mild   to moderate tricuspid regurgitation. Estimated pulmonary artery systolic   pressure is at 54 mmHg assuming a right atrial pressure of 15 mmHg.     Stress Test: n/a    Cardiac Angiography: n/a    Problem List: Patient Active Problem List    Diagnosis Date Noted    COVID-19 08/04/2022    Pacemaker 08/04/2022    SSS (sick sinus syndrome) ([facility] 08/03/2022    Bradycardia 08/03/2022    Septicemia ([facility] 08/03/2022    Sepsis ([facility] 07/30/2022    Stage 3b chronic kidney disease ([facility] 06/26/2022    Cellulitis 06/24/2022    Port-A-Cath in place 08/10/2017    Left BKA 12/09/2014    Diabetes ([facility] 10/23/2014    Fever and chills 10/16/2014    Micturition syncope 10/09/2014    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 SSS (sick sinus syndrome) ([facility]    2. Pacemaker        SSS  - s/p dual [address] MDT PPM (8/3/2022, Dr. Nini Wiley)  - S/p atrial lead revision (8/6/22)  - Device check shows  13.4%, AP 43.9%, 5 episodes NSVT, some appearing to be PAT/SVT, no other arrhythmias notes, STEVEN 12.4 years. - F/u 6 months  - ECG ordered and results personally reviewed     All questions and concerns were addressed to the patient/family. Alternatives to my treatment were discussed. The note was completed using EMR. Every effort was made to ensure accuracy; however, inadvertent computerized transcription errors may be present. Patient received education regarding their diagnosis, treatment and medications while in the office today.       Michael Martinez, [address]

## 2022-11-21 ENCOUNTER — NURSE ONLY (OUTPATIENT)
Dept: CARDIOLOGY CLINIC | Age: 72
End: 2022-11-21
Payer: OTHER GOVERNMENT

## 2022-11-21 ENCOUNTER — OFFICE VISIT (OUTPATIENT)
Dept: CARDIOLOGY CLINIC | Age: 72
End: 2022-11-21
Payer: OTHER GOVERNMENT

## 2022-11-21 VITALS
DIASTOLIC BLOOD PRESSURE: 60 MMHG | SYSTOLIC BLOOD PRESSURE: 100 MMHG | BODY MASS INDEX: 27.94 KG/M2 | WEIGHT: 206 LBS | HEART RATE: 76 BPM

## 2022-11-21 DIAGNOSIS — I49.5 SSS (SICK SINUS SYNDROME) (HCC): ICD-10-CM

## 2022-11-21 DIAGNOSIS — Z95.0 PACEMAKER: Primary | ICD-10-CM

## 2022-11-21 DIAGNOSIS — Z95.0 PACEMAKER: ICD-10-CM

## 2022-11-21 DIAGNOSIS — I49.5 SSS (SICK SINUS SYNDROME) (HCC): Primary | ICD-10-CM

## 2022-11-21 DIAGNOSIS — R00.1 BRADYCARDIA: ICD-10-CM

## 2022-11-21 PROCEDURE — 99213 OFFICE O/P EST LOW 20 MIN: CPT | Performed by: NURSE PRACTITIONER

## 2022-11-21 PROCEDURE — 93280 PM DEVICE PROGR EVAL DUAL: CPT | Performed by: INTERNAL MEDICINE

## 2022-11-21 PROCEDURE — 1123F ACP DISCUSS/DSCN MKR DOCD: CPT | Performed by: NURSE PRACTITIONER

## 2022-11-21 RX ORDER — TAMSULOSIN HYDROCHLORIDE 0.4 MG/1
0.4 CAPSULE ORAL DAILY
COMMUNITY

## 2022-11-21 NOTE — PROGRESS NOTES
Patient comes in for programming evaluation on their Medtronic [id number] [place] XT DR MRI DC PPM.     Last remote 10/1/22    All sensing and pacing parameters are within normal range. Battery life 12. 4y  AP 43.9%.  13.4%. AT/AF burden <0.1%  PVC 51.9/hr  5 NSVT, recent 11/18/22  Pt remains on Lasix  Outputs lowered, RV sensitivity increased to 0.45mV from 0.9mV, carelink alerts adjusted, time adjusted. Please see interrogation for more detail. Patient will see NPWS today and follow up in 3 months in office or remotely.

## 2022-12-20 NOTE — PROGRESS NOTES
Remote transmission received from patient's dual chamber pacemaker monitor at home. Transmission shows normal sensing and pacing function. Noted AT. Ap 47.5%   11.1% (MVP On)  PVCs 46.4/hr    End of 91-day monitoring period 11/7/22. EP physician will review. See interrogation under cardiology tab in the [address] [place] [address] [address] field for more details. Will continue to monitor remotely.

## 2023-02-27 ENCOUNTER — NURSE ONLY (OUTPATIENT)
Dept: CARDIOLOGY CLINIC | Age: 73
End: 2023-02-27
Payer: OTHER GOVERNMENT

## 2023-02-27 DIAGNOSIS — I49.5 SSS (SICK SINUS SYNDROME) (HCC): ICD-10-CM

## 2023-02-27 DIAGNOSIS — Z95.0 PACEMAKER: Primary | ICD-10-CM

## 2023-02-27 PROCEDURE — 93294 REM INTERROG EVL PM/LDLS PM: CPT | Performed by: INTERNAL MEDICINE

## 2023-02-27 PROCEDURE — 93296 REM INTERROG EVL PM/IDS: CPT | Performed by: INTERNAL MEDICINE

## 2023-03-01 NOTE — PROGRESS NOTES
Remote transmission received from patient's dual chamber pacemaker monitor at home. Transmission shows normal sensing and pacing function. Noted NSVT (no BB). Ap 76.1%   <0.1% (MVP On)  PVCs 117.8/hr  Echo 68.8285 showed EF of 45-50%. End of 91-day monitoring period 2/27/23. EP physician will review. See interrogation under cardiology tab in the [address] [place] [address] [address] field for more details. Will continue to monitor remotely.

## 2023-03-03 NOTE — RESULT ENCOUNTER NOTE
Unremarkable device check.    Continue to monitor    Rosenda Khalil MD   Cardiac Electrophysiology  16 [facility] [phone]

## 2023-05-09 ENCOUNTER — TELEPHONE (OUTPATIENT)
Dept: CARDIOLOGY CLINIC | Age: 73
End: 2023-05-09

## 2023-05-09 NOTE — TELEPHONE ENCOUNTER
The patient is calling to cancel his remote device checks as he is with formerly [facility] and he will be seeing a cardiologist with the [place]. Please cancel the remote device checks for 6/7/23 and 9/20/23.

## 2023-05-09 NOTE — TELEPHONE ENCOUNTER
Remote appts canceled as requested. [facility] [phone].

## 2023-09-08 ENCOUNTER — HOSPITAL ENCOUNTER (EMERGENCY)
Age: 73
Discharge: HOME | End: 2023-09-08
Attending: EMERGENCY MEDICINE
Payer: OTHER GOVERNMENT

## 2023-09-08 VITALS
SYSTOLIC BLOOD PRESSURE: 120 MMHG | HEIGHT: 70 IN | OXYGEN SATURATION: 96 % | RESPIRATION RATE: 18 BRPM | TEMPERATURE: 98 F | WEIGHT: 200 LBS | HEART RATE: 75 BPM | DIASTOLIC BLOOD PRESSURE: 77 MMHG | BODY MASS INDEX: 28.63 KG/M2

## 2023-09-08 DIAGNOSIS — N18.4 CHRONIC RENAL IMPAIRMENT, STAGE 4 (SEVERE) (HCC): Primary | ICD-10-CM

## 2023-09-08 LAB
ANION GAP SERPL CALCULATED.3IONS-SCNC: 18 MMOL/L (ref 3–16)
BASOPHILS # BLD: 0 K/UL (ref 0–0.2)
BASOPHILS NFR BLD: 0.2 %
BUN SERPL-MCNC: 58 MG/DL (ref 7–20)
CALCIUM SERPL-MCNC: 9.3 MG/DL (ref 8.3–10.6)
CHLORIDE SERPL-SCNC: 95 MMOL/L (ref 99–110)
CO2 SERPL-SCNC: 24 MMOL/L (ref 21–32)
CREAT SERPL-MCNC: 4.6 MG/DL (ref 0.8–1.3)
DEPRECATED RDW RBC AUTO: 13 % (ref 12.4–15.4)
EOSINOPHIL # BLD: 0 K/UL (ref 0–0.6)
EOSINOPHIL NFR BLD: 0.5 %
GFR SERPLBLD CREATININE-BSD FMLA CKD-EPI: 13 ML/MIN/{1.73_M2}
GLUCOSE SERPL-MCNC: 161 MG/DL (ref 70–99)
HCT VFR BLD AUTO: 33.8 % (ref 40.5–52.5)
HGB BLD-MCNC: 12.1 G/DL (ref 13.5–17.5)
LYMPHOCYTES # BLD: 0.7 K/UL (ref 1–5.1)
LYMPHOCYTES NFR BLD: 9.6 %
MCH RBC QN AUTO: 36.6 PG (ref 26–34)
MCHC RBC AUTO-ENTMCNC: 35.8 G/DL (ref 31–36)
MCV RBC AUTO: 102.1 FL (ref 80–100)
MONOCYTES # BLD: 0.5 K/UL (ref 0–1.3)
MONOCYTES NFR BLD: 7.4 %
NEUTROPHILS # BLD: 5.9 K/UL (ref 1.7–7.7)
NEUTROPHILS NFR BLD: 82.3 %
PLATELET # BLD AUTO: 274 K/UL (ref 135–450)
PMV BLD AUTO: 8.8 FL (ref 5–10.5)
POTASSIUM SERPL-SCNC: 4.3 MMOL/L (ref 3.5–5.1)
RBC # BLD AUTO: 3.31 M/UL (ref 4.2–5.9)
SODIUM SERPL-SCNC: 137 MMOL/L (ref 136–145)
URN SPEC COLLECT METH UR: NORMAL
WBC # BLD AUTO: 7.2 K/UL (ref 4–11)

## 2023-09-08 PROCEDURE — 99283 EMERGENCY DEPT VISIT LOW MDM: CPT

## 2023-09-08 PROCEDURE — 80048 BASIC METABOLIC PNL TOTAL CA: CPT

## 2023-09-08 PROCEDURE — 85025 COMPLETE CBC W/AUTO DIFF WBC: CPT

## 2023-09-08 ASSESSMENT — PAIN - FUNCTIONAL ASSESSMENT: PAIN_FUNCTIONAL_ASSESSMENT: NONE - DENIES PAIN

## 2023-09-08 ASSESSMENT — ENCOUNTER SYMPTOMS
SHORTNESS OF BREATH: 0
ABDOMINAL PAIN: 0
BACK PAIN: 0

## 2023-09-08 NOTE — PROGRESS NOTES
ER team called for worsening Cr  Acute kidney injury vs progression of CKD ? Pt on room air and lytes stable  No urgency of dialysis but will need inpatient workup and will see patient in morning   Follow [facility]A and also check kidney ultrasound and post void bladder scan.

## 2023-09-08 NOTE — DISCHARGE INSTRUCTIONS
Have your labs rechecked early next week. He will also need further work-up to determine what the cause of the worsening renal insufficiency is. This may be done as an outpatient or they may decide to admit you to the hospital to expedite this work-up.   Return for confusion, inability to urinate, significant swelling of the legs or abdomen, or any other concerns

## 2024-06-25 NOTE — PROGRESS NOTES
Clinical Pharmacy Progress Note    Vancomycin - Management by Pharmacy    Consult Date(s): 06/24/22  Consulting Provider(s): Bishop    Assessment / Plan:  1)  LLE BKA site cellulitis - Vancomycin   Concurrent Antimicrobials: None   Day of Vanc Therapy / Ordered Duration: 2 of 7   Current Dosing Method: Intermittent Dosing by Levels in setting of CKD (baseline SCr ~ 1.4)  o Therapeutic Goal: Trough ~15 mg/L  o Random level this AM = 17.4 mg/L. Will give 1000mg IV x1 today. o Will check random level in AM tomorrow.  Will continue to monitor clinical condition and make adjustments to regimen as appropriate. Please call with questions--  Thanks--  Alric Koyanagi, PharmD, BCPS, BCGP  [id number] ([facility])   6/25/2022 9:56 AM        Interval update:  No acute events overnight. Subjective/Objective:  Sejal Rodrigez is a 66 y.o. male with a PMHx significant for diabetes, metastatis chondroblastic osteosarcoma (s/p L BKA), and trachooesopheaal fistula who is admitted for swelling and pain of left BKA site concerning for cellulitis and MILEY on CKD. Pharmacy is consulted to dose vancomycin     Ht Readings from Last 1 Encounters:   06/24/22 5' 11\" (1.803 m)     Wt Readings from Last 1 Encounters:   06/24/22 201 lb 11.5 oz (91.5 kg)       Current & Prior Antimicrobial Regimen(s):  Vancomycin - Pharmacy to dose   Intermittent dosing (6/24-current)    Date Vancomycin Level Vancomycin Dose   6/24  1500mg   6/25 17.4 mcg/mL           Cultures & Sensitivities:    Date Site Micro Susceptibility / Result   None              Labs / Ancillary Data:    Estimated Creatinine Clearance: 29 mL/min (A) (based on SCr of 2.6 mg/dL (H)).     Recent Labs     06/24/22  1729 06/25/22  0612   CREATININE 2.5* 2.6*   BUN 39* 43*   WBC 6.3 5.2 Wound RN Consult Visit (20 minutes)    Reason for visit: Tubular compression bilateral lower leg low    Measured and applied Size H to bilateral lower legs.     Patient educated on application and removal. Tetragrip not to roll over at knee; however, okay to overlap at feet.     Plan discussed with bedside nurse.

## 2025-08-04 ENCOUNTER — HOSPITAL ENCOUNTER (INPATIENT)
Age: 75
LOS: 2 days | Discharge: HOME | DRG: 314 | End: 2025-08-06
Attending: EMERGENCY MEDICINE | Admitting: INTERNAL MEDICINE
Payer: OTHER GOVERNMENT

## 2025-08-04 ENCOUNTER — APPOINTMENT (OUTPATIENT)
Dept: GENERAL RADIOLOGY | Age: 75
DRG: 314 | End: 2025-08-04
Payer: OTHER GOVERNMENT

## 2025-08-04 DIAGNOSIS — R13.19 OTHER DYSPHAGIA: ICD-10-CM

## 2025-08-04 DIAGNOSIS — R79.89 ELEVATED TROPONIN: ICD-10-CM

## 2025-08-04 DIAGNOSIS — I95.9 HYPOTENSION, UNSPECIFIED HYPOTENSION TYPE: Primary | ICD-10-CM

## 2025-08-04 LAB
ANION GAP SERPL CALCULATED.3IONS-SCNC: 20 MMOL/L (ref 3–16)
BASE EXCESS BLDV CALC-SCNC: 3.3 MMOL/L (ref -2–3)
BASOPHILS # BLD: 0 K/UL (ref 0–0.2)
BASOPHILS NFR BLD: 0.2 %
BUN SERPL-MCNC: 79 MG/DL (ref 7–20)
CALCIUM SERPL-MCNC: 9.4 MG/DL (ref 8.3–10.6)
CHLORIDE SERPL-SCNC: 89 MMOL/L (ref 99–110)
CHP ED QC CHECK: YES
CO2 BLDV-SCNC: 30 MMOL/L
CO2 SERPL-SCNC: 25 MMOL/L (ref 21–32)
COHGB MFR BLDV: 1.2 % (ref 0–1.5)
CREAT SERPL-MCNC: 9.4 MG/DL (ref 0.8–1.3)
DEPRECATED RDW RBC AUTO: 13.3 % (ref 12.4–15.4)
DO-HGB MFR BLDV: 20.6 %
EKG ATRIAL RATE: 91 BPM
EKG DIAGNOSIS: NORMAL
EKG P AXIS: 72 DEGREES
EKG P-R INTERVAL: 108 MS
EKG Q-T INTERVAL: 396 MS
EKG QRS DURATION: 112 MS
EKG QTC CALCULATION (BAZETT): 487 MS
EKG R AXIS: -44 DEGREES
EKG T AXIS: 83 DEGREES
EKG VENTRICULAR RATE: 91 BPM
EOSINOPHIL # BLD: 0 K/UL (ref 0–0.6)
EOSINOPHIL NFR BLD: 0.4 %
GFR SERPLBLD CREATININE-BSD FMLA CKD-EPI: 5 ML/MIN/{1.73_M2}
GLUCOSE BLD-MCNC: 144 MG/DL (ref 70–99)
GLUCOSE BLD-MCNC: 216 MG/DL
GLUCOSE BLD-MCNC: 216 MG/DL (ref 70–99)
GLUCOSE SERPL-MCNC: 230 MG/DL (ref 70–99)
HCO3 BLDV-SCNC: 28.4 MMOL/L (ref 24–28)
HCT VFR BLD AUTO: 36.9 % (ref 40.5–52.5)
HGB BLD-MCNC: 12.8 G/DL (ref 13.5–17.5)
LACTATE BLDV-SCNC: 1.7 MMOL/L (ref 0.4–1.9)
LYMPHOCYTES # BLD: 0.6 K/UL (ref 1–5.1)
LYMPHOCYTES NFR BLD: 7.8 %
MCH RBC QN AUTO: 35.2 PG (ref 26–34)
MCHC RBC AUTO-ENTMCNC: 34.8 G/DL (ref 31–36)
MCV RBC AUTO: 101.2 FL (ref 80–100)
METHGB MFR BLDV: 0.5 % (ref 0–1.5)
MONOCYTES # BLD: 0.5 K/UL (ref 0–1.3)
MONOCYTES NFR BLD: 6.8 %
NEUTROPHILS # BLD: 6.1 K/UL (ref 1.7–7.7)
NEUTROPHILS NFR BLD: 84.8 %
PCO2 BLDV: 44.1 MMHG (ref 41–51)
PERFORMED ON: ABNORMAL
PERFORMED ON: ABNORMAL
PH BLDV: 7.42 [PH] (ref 7.35–7.45)
PHOSPHATE SERPL-MCNC: 4.1 MG/DL (ref 2.5–4.9)
PLATELET # BLD AUTO: 227 K/UL (ref 135–450)
PMV BLD AUTO: 7.8 FL (ref 5–10.5)
PO2 BLDV: 46.2 MMHG (ref 25–40)
POTASSIUM SERPL-SCNC: 3.8 MMOL/L (ref 3.5–5.1)
RBC # BLD AUTO: 3.64 M/UL (ref 4.2–5.9)
SAO2 % BLDV: 79 %
SODIUM SERPL-SCNC: 134 MMOL/L (ref 136–145)
TROPONIN, HIGH SENSITIVITY: 101 NG/L (ref 0–22)
TROPONIN, HIGH SENSITIVITY: 94 NG/L (ref 0–22)
WBC # BLD AUTO: 7.2 K/UL (ref 4–11)

## 2025-08-04 PROCEDURE — 84100 ASSAY OF PHOSPHORUS: CPT

## 2025-08-04 PROCEDURE — 83036 HEMOGLOBIN GLYCOSYLATED A1C: CPT

## 2025-08-04 PROCEDURE — 83605 ASSAY OF LACTIC ACID: CPT

## 2025-08-04 PROCEDURE — 84484 ASSAY OF TROPONIN QUANT: CPT

## 2025-08-04 PROCEDURE — 2500000003 HC RX 250 WO HCPCS: Performed by: INTERNAL MEDICINE

## 2025-08-04 PROCEDURE — 93005 ELECTROCARDIOGRAM TRACING: CPT | Performed by: EMERGENCY MEDICINE

## 2025-08-04 PROCEDURE — 99285 EMERGENCY DEPT VISIT HI MDM: CPT

## 2025-08-04 PROCEDURE — 6370000000 HC RX 637 (ALT 250 FOR IP): Performed by: INTERNAL MEDICINE

## 2025-08-04 PROCEDURE — 80048 BASIC METABOLIC PNL TOTAL CA: CPT

## 2025-08-04 PROCEDURE — 2060000000 HC ICU INTERMEDIATE R&B

## 2025-08-04 PROCEDURE — 6370000000 HC RX 637 (ALT 250 FOR IP): Performed by: EMERGENCY MEDICINE

## 2025-08-04 PROCEDURE — 85025 COMPLETE CBC W/AUTO DIFF WBC: CPT

## 2025-08-04 PROCEDURE — 71046 X-RAY EXAM CHEST 2 VIEWS: CPT

## 2025-08-04 PROCEDURE — 82803 BLOOD GASES ANY COMBINATION: CPT

## 2025-08-04 PROCEDURE — 6360000002 HC RX W HCPCS: Performed by: INTERNAL MEDICINE

## 2025-08-04 PROCEDURE — 2580000003 HC RX 258: Performed by: EMERGENCY MEDICINE

## 2025-08-04 PROCEDURE — 36415 COLL VENOUS BLD VENIPUNCTURE: CPT

## 2025-08-04 RX ORDER — HEPARIN SODIUM 5000 [USP'U]/ML
5000 INJECTION, SOLUTION INTRAVENOUS; SUBCUTANEOUS EVERY 8 HOURS SCHEDULED
Status: DISCONTINUED | OUTPATIENT
Start: 2025-08-04 | End: 2025-08-06 | Stop reason: HOSPADM

## 2025-08-04 RX ORDER — GLUCAGON 1 MG/ML
1 KIT INJECTION PRN
Status: DISCONTINUED | OUTPATIENT
Start: 2025-08-04 | End: 2025-08-06 | Stop reason: HOSPADM

## 2025-08-04 RX ORDER — ONDANSETRON 2 MG/ML
4 INJECTION INTRAMUSCULAR; INTRAVENOUS EVERY 6 HOURS PRN
Status: DISCONTINUED | OUTPATIENT
Start: 2025-08-04 | End: 2025-08-06 | Stop reason: HOSPADM

## 2025-08-04 RX ORDER — ASPIRIN 81 MG/1
324 TABLET, CHEWABLE ORAL ONCE
Status: COMPLETED | OUTPATIENT
Start: 2025-08-04 | End: 2025-08-04

## 2025-08-04 RX ORDER — SODIUM CHLORIDE 0.9 % (FLUSH) 0.9 %
5-40 SYRINGE (ML) INJECTION EVERY 12 HOURS SCHEDULED
Status: DISCONTINUED | OUTPATIENT
Start: 2025-08-04 | End: 2025-08-06 | Stop reason: HOSPADM

## 2025-08-04 RX ORDER — SODIUM CHLORIDE 0.9 % (FLUSH) 0.9 %
5-40 SYRINGE (ML) INJECTION PRN
Status: DISCONTINUED | OUTPATIENT
Start: 2025-08-04 | End: 2025-08-06 | Stop reason: HOSPADM

## 2025-08-04 RX ORDER — PREGABALIN 75 MG/1
75 CAPSULE ORAL DAILY
Status: DISCONTINUED | OUTPATIENT
Start: 2025-08-05 | End: 2025-08-06 | Stop reason: HOSPADM

## 2025-08-04 RX ORDER — ONDANSETRON 4 MG/1
4 TABLET, ORALLY DISINTEGRATING ORAL EVERY 8 HOURS PRN
Status: DISCONTINUED | OUTPATIENT
Start: 2025-08-04 | End: 2025-08-06 | Stop reason: HOSPADM

## 2025-08-04 RX ORDER — INSULIN GLARGINE 100 [IU]/ML
19 INJECTION, SOLUTION SUBCUTANEOUS EVERY MORNING
Status: DISCONTINUED | OUTPATIENT
Start: 2025-08-05 | End: 2025-08-06 | Stop reason: HOSPADM

## 2025-08-04 RX ORDER — INSULIN LISPRO 100 [IU]/ML
0-4 INJECTION, SOLUTION INTRAVENOUS; SUBCUTANEOUS
Status: DISCONTINUED | OUTPATIENT
Start: 2025-08-04 | End: 2025-08-06 | Stop reason: HOSPADM

## 2025-08-04 RX ORDER — MIDODRINE HYDROCHLORIDE 5 MG/1
5 TABLET ORAL
Status: DISCONTINUED | OUTPATIENT
Start: 2025-08-05 | End: 2025-08-05

## 2025-08-04 RX ORDER — PANTOPRAZOLE SODIUM 40 MG/1
40 TABLET, DELAYED RELEASE ORAL
Status: DISCONTINUED | OUTPATIENT
Start: 2025-08-05 | End: 2025-08-06

## 2025-08-04 RX ORDER — ATORVASTATIN CALCIUM 20 MG/1
20 TABLET, FILM COATED ORAL NIGHTLY
Status: DISCONTINUED | OUTPATIENT
Start: 2025-08-04 | End: 2025-08-06 | Stop reason: HOSPADM

## 2025-08-04 RX ORDER — MIDODRINE HYDROCHLORIDE 5 MG/1
5 TABLET ORAL ONCE
Status: COMPLETED | OUTPATIENT
Start: 2025-08-04 | End: 2025-08-04

## 2025-08-04 RX ORDER — FUROSEMIDE 40 MG/1
40 TABLET ORAL DAILY
Status: DISCONTINUED | OUTPATIENT
Start: 2025-08-05 | End: 2025-08-05

## 2025-08-04 RX ORDER — SODIUM CHLORIDE, SODIUM LACTATE, POTASSIUM CHLORIDE, AND CALCIUM CHLORIDE .6; .31; .03; .02 G/100ML; G/100ML; G/100ML; G/100ML
500 INJECTION, SOLUTION INTRAVENOUS ONCE
Status: COMPLETED | OUTPATIENT
Start: 2025-08-04 | End: 2025-08-04

## 2025-08-04 RX ORDER — ACETAMINOPHEN 650 MG/1
650 SUPPOSITORY RECTAL EVERY 6 HOURS PRN
Status: DISCONTINUED | OUTPATIENT
Start: 2025-08-04 | End: 2025-08-06 | Stop reason: HOSPADM

## 2025-08-04 RX ORDER — ACETAMINOPHEN 325 MG/1
650 TABLET ORAL EVERY 6 HOURS PRN
Status: DISCONTINUED | OUTPATIENT
Start: 2025-08-04 | End: 2025-08-05

## 2025-08-04 RX ORDER — VITAMIN B COMPLEX
1000 TABLET ORAL DAILY
Status: DISCONTINUED | OUTPATIENT
Start: 2025-08-05 | End: 2025-08-06 | Stop reason: HOSPADM

## 2025-08-04 RX ORDER — SODIUM CHLORIDE, SODIUM LACTATE, POTASSIUM CHLORIDE, AND CALCIUM CHLORIDE .6; .31; .03; .02 G/100ML; G/100ML; G/100ML; G/100ML
1000 INJECTION, SOLUTION INTRAVENOUS ONCE
Status: COMPLETED | OUTPATIENT
Start: 2025-08-04 | End: 2025-08-04

## 2025-08-04 RX ORDER — TAMSULOSIN HYDROCHLORIDE 0.4 MG/1
0.4 CAPSULE ORAL DAILY
Status: DISCONTINUED | OUTPATIENT
Start: 2025-08-05 | End: 2025-08-05

## 2025-08-04 RX ORDER — POLYETHYLENE GLYCOL 3350 17 G/17G
17 POWDER, FOR SOLUTION ORAL DAILY PRN
Status: DISCONTINUED | OUTPATIENT
Start: 2025-08-04 | End: 2025-08-06 | Stop reason: HOSPADM

## 2025-08-04 RX ORDER — MECOBALAMIN 5000 MCG
10 TABLET,DISINTEGRATING ORAL NIGHTLY PRN
Status: DISCONTINUED | OUTPATIENT
Start: 2025-08-04 | End: 2025-08-06 | Stop reason: HOSPADM

## 2025-08-04 RX ORDER — SODIUM CHLORIDE 9 MG/ML
INJECTION, SOLUTION INTRAVENOUS PRN
Status: DISCONTINUED | OUTPATIENT
Start: 2025-08-04 | End: 2025-08-06 | Stop reason: HOSPADM

## 2025-08-04 RX ORDER — DEXTROSE MONOHYDRATE 100 MG/ML
INJECTION, SOLUTION INTRAVENOUS CONTINUOUS PRN
Status: DISCONTINUED | OUTPATIENT
Start: 2025-08-04 | End: 2025-08-06 | Stop reason: HOSPADM

## 2025-08-04 RX ORDER — ASPIRIN 81 MG/1
81 TABLET ORAL DAILY
Status: DISCONTINUED | OUTPATIENT
Start: 2025-08-05 | End: 2025-08-06 | Stop reason: HOSPADM

## 2025-08-04 RX ADMIN — SODIUM CHLORIDE, SODIUM LACTATE, POTASSIUM CHLORIDE, AND CALCIUM CHLORIDE 500 ML: .6; .31; .03; .02 INJECTION, SOLUTION INTRAVENOUS at 12:43

## 2025-08-04 RX ADMIN — ASPIRIN 324 MG: 81 TABLET, CHEWABLE ORAL at 15:03

## 2025-08-04 RX ADMIN — SODIUM CHLORIDE, SODIUM LACTATE, POTASSIUM CHLORIDE, AND CALCIUM CHLORIDE 1000 ML: .6; .31; .03; .02 INJECTION, SOLUTION INTRAVENOUS at 15:05

## 2025-08-04 RX ADMIN — ATORVASTATIN CALCIUM 20 MG: 20 TABLET, FILM COATED ORAL at 23:05

## 2025-08-04 RX ADMIN — SODIUM CHLORIDE, PRESERVATIVE FREE 10 ML: 5 INJECTION INTRAVENOUS at 23:05

## 2025-08-04 RX ADMIN — HEPARIN SODIUM 5000 UNITS: 5000 INJECTION, SOLUTION INTRAVENOUS; SUBCUTANEOUS at 23:05

## 2025-08-04 RX ADMIN — MIDODRINE HYDROCHLORIDE 5 MG: 5 TABLET ORAL at 18:17

## 2025-08-04 ASSESSMENT — LIFESTYLE VARIABLES
HOW MANY STANDARD DRINKS CONTAINING ALCOHOL DO YOU HAVE ON A TYPICAL DAY: 1 OR 2
HOW OFTEN DO YOU HAVE A DRINK CONTAINING ALCOHOL: MONTHLY OR LESS

## 2025-08-04 ASSESSMENT — PAIN SCALES - GENERAL
PAINLEVEL_OUTOF10: 0
PAINLEVEL_OUTOF10: 0

## 2025-08-05 ENCOUNTER — ANESTHESIA EVENT (OUTPATIENT)
Dept: ENDOSCOPY | Age: 75
DRG: 314 | End: 2025-08-05
Payer: OTHER GOVERNMENT

## 2025-08-05 ENCOUNTER — APPOINTMENT (OUTPATIENT)
Age: 75
DRG: 314 | End: 2025-08-05
Attending: INTERNAL MEDICINE
Payer: OTHER GOVERNMENT

## 2025-08-05 ENCOUNTER — APPOINTMENT (OUTPATIENT)
Dept: ENDOSCOPY | Age: 75
DRG: 314 | End: 2025-08-05
Attending: INTERNAL MEDICINE
Payer: OTHER GOVERNMENT

## 2025-08-05 ENCOUNTER — ANESTHESIA (OUTPATIENT)
Dept: ENDOSCOPY | Age: 75
DRG: 314 | End: 2025-08-05
Payer: OTHER GOVERNMENT

## 2025-08-05 PROBLEM — E43 SEVERE MALNUTRITION: Status: ACTIVE | Noted: 2025-08-05

## 2025-08-05 LAB
ANION GAP SERPL CALCULATED.3IONS-SCNC: 17 MMOL/L (ref 3–16)
BASOPHILS # BLD: 0 K/UL (ref 0–0.2)
BASOPHILS NFR BLD: 0.5 %
BUN SERPL-MCNC: 83 MG/DL (ref 7–20)
CALCIUM SERPL-MCNC: 9.1 MG/DL (ref 8.3–10.6)
CHLORIDE SERPL-SCNC: 94 MMOL/L (ref 99–110)
CO2 SERPL-SCNC: 28 MMOL/L (ref 21–32)
CORTIS SERPL-MCNC: 12.7 UG/DL
CREAT SERPL-MCNC: 9.8 MG/DL (ref 0.8–1.3)
DEPRECATED RDW RBC AUTO: 12.9 % (ref 12.4–15.4)
ECHO AO ASC DIAM: 3.3 CM
ECHO AO ASCENDING AORTA INDEX: 1.66 CM/M2
ECHO AO ROOT DIAM: 3.7 CM
ECHO AO ROOT INDEX: 1.86 CM/M2
ECHO BSA: 1.99 M2
ECHO EST RA PRESSURE: 3 MMHG
ECHO IVC EXP: 1.7 CM
ECHO LA AREA 2C: 16.4 CM2
ECHO LA AREA 4C: 21.7 CM2
ECHO LA MAJOR AXIS: 7.5 CM
ECHO LA MINOR AXIS: 5.3 CM
ECHO LA VOL MOD A2C: 41 ML (ref 18–58)
ECHO LA VOL MOD A4C: 48 ML (ref 18–58)
ECHO LA VOLUME INDEX MOD A2C: 21 ML/M2 (ref 16–34)
ECHO LA VOLUME INDEX MOD A4C: 24 ML/M2 (ref 16–34)
ECHO LV E' LATERAL VELOCITY: 6.09 CM/S
ECHO LV E' SEPTAL VELOCITY: 4.46 CM/S
ECHO LV EDV A2C: 112 ML
ECHO LV EDV A4C: 100 ML
ECHO LV EDV INDEX A4C: 50 ML/M2
ECHO LV EDV NDEX A2C: 56 ML/M2
ECHO LV EF PHYSICIAN: 33 %
ECHO LV EJECTION FRACTION A2C: 40 %
ECHO LV EJECTION FRACTION A4C: 31 %
ECHO LV ESV A2C: 67 ML
ECHO LV ESV A4C: 69 ML
ECHO LV ESV INDEX A2C: 34 ML/M2
ECHO LV ESV INDEX A4C: 35 ML/M2
ECHO LV FRACTIONAL SHORTENING: 19 % (ref 28–44)
ECHO LV INTERNAL DIMENSION DIASTOLE INDEX: 2.66 CM/M2
ECHO LV INTERNAL DIMENSION DIASTOLIC: 5.3 CM (ref 4.2–5.9)
ECHO LV INTERNAL DIMENSION SYSTOLIC INDEX: 2.16 CM/M2
ECHO LV INTERNAL DIMENSION SYSTOLIC: 4.3 CM
ECHO LV IVSD: 1 CM (ref 0.6–1)
ECHO LV MASS 2D: 200.4 G (ref 88–224)
ECHO LV MASS INDEX 2D: 100.7 G/M2 (ref 49–115)
ECHO LV POSTERIOR WALL DIASTOLIC: 1 CM (ref 0.6–1)
ECHO LV RELATIVE WALL THICKNESS RATIO: 0.38
ECHO LVOT AREA: 3.1 CM2
ECHO LVOT DIAM: 2 CM
ECHO MV A VELOCITY: 0.56 M/S
ECHO MV E DECELERATION TIME (DT): 325 MS
ECHO MV E VELOCITY: 0.43 M/S
ECHO MV E/A RATIO: 0.77
ECHO MV E/E' LATERAL: 7.06
ECHO MV E/E' RATIO (AVERAGED): 8.35
ECHO MV E/E' SEPTAL: 9.64
ECHO PV MAX VELOCITY: 0.6 M/S
ECHO PV PEAK GRADIENT: 1 MMHG
ECHO RA AREA 4C: 22.4 CM2
ECHO RA END SYSTOLIC VOLUME APICAL 4 CHAMBER INDEX BSA: 39 ML/M2
ECHO RA VOLUME: 77 ML
ECHO RV BASAL DIMENSION: 4.5 CM
ECHO RV FREE WALL PEAK S': 13.2 CM/S
ECHO RV MID DIMENSION: 3.2 CM
ECHO RV TAPSE: 2.6 CM (ref 1.7–?)
EOSINOPHIL # BLD: 0.1 K/UL (ref 0–0.6)
EOSINOPHIL NFR BLD: 1.5 %
EST. AVERAGE GLUCOSE BLD GHB EST-MCNC: 111.2 MG/DL
GFR SERPLBLD CREATININE-BSD FMLA CKD-EPI: 5 ML/MIN/{1.73_M2}
GLUCOSE BLD-MCNC: 114 MG/DL (ref 70–99)
GLUCOSE BLD-MCNC: 170 MG/DL (ref 70–99)
GLUCOSE BLD-MCNC: 93 MG/DL (ref 70–99)
GLUCOSE BLD-MCNC: 95 MG/DL (ref 70–99)
GLUCOSE SERPL-MCNC: 90 MG/DL (ref 70–99)
HBA1C MFR BLD: 5.5 %
HBV SURFACE AB SERPL IA-ACNC: 210 MIU/ML
HBV SURFACE AG SERPL QL IA: NORMAL
HCT VFR BLD AUTO: 33.6 % (ref 40.5–52.5)
HGB BLD-MCNC: 11.6 G/DL (ref 13.5–17.5)
LYMPHOCYTES # BLD: 0.8 K/UL (ref 1–5.1)
LYMPHOCYTES NFR BLD: 11.9 %
MAGNESIUM SERPL-MCNC: 2.19 MG/DL (ref 1.8–2.4)
MCH RBC QN AUTO: 35.2 PG (ref 26–34)
MCHC RBC AUTO-ENTMCNC: 34.4 G/DL (ref 31–36)
MCV RBC AUTO: 102.5 FL (ref 80–100)
MONOCYTES # BLD: 0.6 K/UL (ref 0–1.3)
MONOCYTES NFR BLD: 8.4 %
NEUTROPHILS # BLD: 5.3 K/UL (ref 1.7–7.7)
NEUTROPHILS NFR BLD: 77.7 %
PERFORMED ON: ABNORMAL
PERFORMED ON: ABNORMAL
PERFORMED ON: NORMAL
PERFORMED ON: NORMAL
PLATELET # BLD AUTO: 208 K/UL (ref 135–450)
PMV BLD AUTO: 7.9 FL (ref 5–10.5)
POTASSIUM SERPL-SCNC: 3.5 MMOL/L (ref 3.5–5.1)
RBC # BLD AUTO: 3.28 M/UL (ref 4.2–5.9)
SODIUM SERPL-SCNC: 139 MMOL/L (ref 136–145)
WBC # BLD AUTO: 6.8 K/UL (ref 4–11)

## 2025-08-05 PROCEDURE — 83735 ASSAY OF MAGNESIUM: CPT

## 2025-08-05 PROCEDURE — 80048 BASIC METABOLIC PNL TOTAL CA: CPT

## 2025-08-05 PROCEDURE — 3609012900 HC EGD FOREIGN BODY REMOVAL: Performed by: INTERNAL MEDICINE

## 2025-08-05 PROCEDURE — 6370000000 HC RX 637 (ALT 250 FOR IP): Performed by: INTERNAL MEDICINE

## 2025-08-05 PROCEDURE — 6360000002 HC RX W HCPCS

## 2025-08-05 PROCEDURE — 82533 TOTAL CORTISOL: CPT

## 2025-08-05 PROCEDURE — 86706 HEP B SURFACE ANTIBODY: CPT

## 2025-08-05 PROCEDURE — 88305 TISSUE EXAM BY PATHOLOGIST: CPT

## 2025-08-05 PROCEDURE — 6360000002 HC RX W HCPCS: Performed by: INTERNAL MEDICINE

## 2025-08-05 PROCEDURE — 36415 COLL VENOUS BLD VENIPUNCTURE: CPT

## 2025-08-05 PROCEDURE — 1200000000 HC SEMI PRIVATE

## 2025-08-05 PROCEDURE — 2500000003 HC RX 250 WO HCPCS: Performed by: INTERNAL MEDICINE

## 2025-08-05 PROCEDURE — 2580000003 HC RX 258: Performed by: INTERNAL MEDICINE

## 2025-08-05 PROCEDURE — 87340 HEPATITIS B SURFACE AG IA: CPT

## 2025-08-05 PROCEDURE — 93321 DOPPLER ECHO F-UP/LMTD STD: CPT

## 2025-08-05 PROCEDURE — 6370000000 HC RX 637 (ALT 250 FOR IP): Performed by: NURSE PRACTITIONER

## 2025-08-05 PROCEDURE — 0DB18ZX EXCISION OF UPPER ESOPHAGUS, VIA NATURAL OR ARTIFICIAL OPENING ENDOSCOPIC, DIAGNOSTIC: ICD-10-PCS | Performed by: INTERNAL MEDICINE

## 2025-08-05 PROCEDURE — 6360000004 HC RX CONTRAST MEDICATION: Performed by: INTERNAL MEDICINE

## 2025-08-05 PROCEDURE — 90935 HEMODIALYSIS ONE EVALUATION: CPT

## 2025-08-05 PROCEDURE — 3700000000 HC ANESTHESIA ATTENDED CARE: Performed by: INTERNAL MEDICINE

## 2025-08-05 PROCEDURE — 5A1D70Z PERFORMANCE OF URINARY FILTRATION, INTERMITTENT, LESS THAN 6 HOURS PER DAY: ICD-10-PCS | Performed by: INTERNAL MEDICINE

## 2025-08-05 PROCEDURE — 2580000003 HC RX 258

## 2025-08-05 PROCEDURE — 7100000010 HC PHASE II RECOVERY - FIRST 15 MIN: Performed by: INTERNAL MEDICINE

## 2025-08-05 PROCEDURE — 85025 COMPLETE CBC W/AUTO DIFF WBC: CPT

## 2025-08-05 PROCEDURE — 3700000001 HC ADD 15 MINUTES (ANESTHESIA): Performed by: INTERNAL MEDICINE

## 2025-08-05 PROCEDURE — 7100000011 HC PHASE II RECOVERY - ADDTL 15 MIN: Performed by: INTERNAL MEDICINE

## 2025-08-05 PROCEDURE — 3609012400 HC EGD TRANSORAL BIOPSY SINGLE/MULTIPLE: Performed by: INTERNAL MEDICINE

## 2025-08-05 RX ORDER — DEXTROSE MONOHYDRATE AND SODIUM CHLORIDE 5; .45 G/100ML; G/100ML
INJECTION, SOLUTION INTRAVENOUS CONTINUOUS
Status: DISCONTINUED | OUTPATIENT
Start: 2025-08-05 | End: 2025-08-05

## 2025-08-05 RX ORDER — HEPARIN SODIUM 1000 [USP'U]/ML
1000 INJECTION, SOLUTION INTRAVENOUS; SUBCUTANEOUS ONCE
Status: DISCONTINUED | OUTPATIENT
Start: 2025-08-05 | End: 2025-08-06 | Stop reason: HOSPADM

## 2025-08-05 RX ORDER — ACETAMINOPHEN 160 MG/5ML
650 LIQUID ORAL EVERY 6 HOURS PRN
Status: DISCONTINUED | OUTPATIENT
Start: 2025-08-05 | End: 2025-08-06 | Stop reason: HOSPADM

## 2025-08-05 RX ORDER — 0.9 % SODIUM CHLORIDE 0.9 %
100 INTRAVENOUS SOLUTION INTRAVENOUS PRN
Status: DISCONTINUED | OUTPATIENT
Start: 2025-08-05 | End: 2025-08-06 | Stop reason: HOSPADM

## 2025-08-05 RX ORDER — MIDODRINE HYDROCHLORIDE 5 MG/1
10 TABLET ORAL
Status: DISCONTINUED | OUTPATIENT
Start: 2025-08-05 | End: 2025-08-06 | Stop reason: HOSPADM

## 2025-08-05 RX ORDER — SODIUM CHLORIDE, SODIUM LACTATE, POTASSIUM CHLORIDE, CALCIUM CHLORIDE 600; 310; 30; 20 MG/100ML; MG/100ML; MG/100ML; MG/100ML
INJECTION, SOLUTION INTRAVENOUS
Status: DISCONTINUED | OUTPATIENT
Start: 2025-08-05 | End: 2025-08-05 | Stop reason: SDUPTHER

## 2025-08-05 RX ORDER — LIDOCAINE HYDROCHLORIDE 20 MG/ML
INJECTION, SOLUTION INTRAVENOUS
Status: DISCONTINUED | OUTPATIENT
Start: 2025-08-05 | End: 2025-08-05 | Stop reason: SDUPTHER

## 2025-08-05 RX ORDER — PROPOFOL 10 MG/ML
INJECTION, EMULSION INTRAVENOUS
Status: DISCONTINUED | OUTPATIENT
Start: 2025-08-05 | End: 2025-08-05 | Stop reason: SDUPTHER

## 2025-08-05 RX ADMIN — SODIUM CHLORIDE, SODIUM LACTATE, POTASSIUM CHLORIDE, AND CALCIUM CHLORIDE: .6; .31; .03; .02 INJECTION, SOLUTION INTRAVENOUS at 16:21

## 2025-08-05 RX ADMIN — SULFUR HEXAFLUORIDE 2 ML: 60.7; .19; .19 INJECTION, POWDER, LYOPHILIZED, FOR SUSPENSION INTRAVENOUS; INTRAVESICAL at 14:48

## 2025-08-05 RX ADMIN — PROPOFOL 40 MG: 10 INJECTION, EMULSION INTRAVENOUS at 16:36

## 2025-08-05 RX ADMIN — HEPARIN SODIUM 5000 UNITS: 5000 INJECTION, SOLUTION INTRAVENOUS; SUBCUTANEOUS at 13:44

## 2025-08-05 RX ADMIN — SODIUM CHLORIDE, PRESERVATIVE FREE 10 ML: 5 INJECTION INTRAVENOUS at 08:07

## 2025-08-05 RX ADMIN — LIDOCAINE HYDROCHLORIDE 100 MG: 20 INJECTION, SOLUTION INTRAVENOUS at 16:31

## 2025-08-05 RX ADMIN — HEPARIN SODIUM 5000 UNITS: 5000 INJECTION, SOLUTION INTRAVENOUS; SUBCUTANEOUS at 20:03

## 2025-08-05 RX ADMIN — HEPARIN SODIUM 5000 UNITS: 5000 INJECTION, SOLUTION INTRAVENOUS; SUBCUTANEOUS at 05:24

## 2025-08-05 RX ADMIN — DEXTROSE MONOHYDRATE AND SODIUM CHLORIDE: 5; .45 INJECTION, SOLUTION INTRAVENOUS at 13:44

## 2025-08-05 RX ADMIN — SODIUM CHLORIDE, PRESERVATIVE FREE 10 ML: 5 INJECTION INTRAVENOUS at 20:03

## 2025-08-05 RX ADMIN — PANTOPRAZOLE SODIUM 40 MG: 40 INJECTION, POWDER, FOR SOLUTION INTRAVENOUS at 23:53

## 2025-08-05 RX ADMIN — PANTOPRAZOLE SODIUM 40 MG: 40 TABLET, DELAYED RELEASE ORAL at 05:24

## 2025-08-05 RX ADMIN — PROPOFOL 80 MG: 10 INJECTION, EMULSION INTRAVENOUS at 16:31

## 2025-08-05 RX ADMIN — ACETAMINOPHEN 650 MG: 650 SOLUTION ORAL at 21:31

## 2025-08-05 RX ADMIN — PROPOFOL 20 MG: 10 INJECTION, EMULSION INTRAVENOUS at 16:41

## 2025-08-05 ASSESSMENT — PAIN SCALES - GENERAL
PAINLEVEL_OUTOF10: 0
PAINLEVEL_OUTOF10: 3
PAINLEVEL_OUTOF10: 7
PAINLEVEL_OUTOF10: 5

## 2025-08-05 ASSESSMENT — PAIN DESCRIPTION - PAIN TYPE: TYPE: CHRONIC PAIN

## 2025-08-05 ASSESSMENT — PAIN DESCRIPTION - LOCATION
LOCATION: BACK
LOCATION: BACK

## 2025-08-05 ASSESSMENT — PAIN DESCRIPTION - ONSET: ONSET: ON-GOING

## 2025-08-05 ASSESSMENT — PAIN DESCRIPTION - DESCRIPTORS
DESCRIPTORS: ACHING;DULL
DESCRIPTORS: ACHING

## 2025-08-05 ASSESSMENT — PAIN - FUNCTIONAL ASSESSMENT
PAIN_FUNCTIONAL_ASSESSMENT: PREVENTS OR INTERFERES WITH ALL ACTIVE AND SOME PASSIVE ACTIVITIES
PAIN_FUNCTIONAL_ASSESSMENT: ACTIVITIES ARE NOT PREVENTED
PAIN_FUNCTIONAL_ASSESSMENT: NONE - DENIES PAIN
PAIN_FUNCTIONAL_ASSESSMENT: WONG-BAKER FACES

## 2025-08-05 ASSESSMENT — PAIN DESCRIPTION - ORIENTATION
ORIENTATION: UPPER
ORIENTATION: MID

## 2025-08-05 ASSESSMENT — PAIN DESCRIPTION - FREQUENCY: FREQUENCY: CONTINUOUS

## 2025-08-06 VITALS
HEIGHT: 71 IN | DIASTOLIC BLOOD PRESSURE: 65 MMHG | WEIGHT: 174.16 LBS | HEART RATE: 80 BPM | TEMPERATURE: 98 F | RESPIRATION RATE: 20 BRPM | BODY MASS INDEX: 24.38 KG/M2 | OXYGEN SATURATION: 6 % | SYSTOLIC BLOOD PRESSURE: 119 MMHG

## 2025-08-06 LAB
ANION GAP SERPL CALCULATED.3IONS-SCNC: 12 MMOL/L (ref 3–16)
BASOPHILS # BLD: 0 K/UL (ref 0–0.2)
BASOPHILS NFR BLD: 0.5 %
BUN SERPL-MCNC: 35 MG/DL (ref 7–20)
CALCIUM SERPL-MCNC: 8.8 MG/DL (ref 8.3–10.6)
CHLORIDE SERPL-SCNC: 100 MMOL/L (ref 99–110)
CO2 SERPL-SCNC: 28 MMOL/L (ref 21–32)
CREAT SERPL-MCNC: 5.8 MG/DL (ref 0.8–1.3)
DEPRECATED RDW RBC AUTO: 13.2 % (ref 12.4–15.4)
EOSINOPHIL # BLD: 0.1 K/UL (ref 0–0.6)
EOSINOPHIL NFR BLD: 1.5 %
GFR SERPLBLD CREATININE-BSD FMLA CKD-EPI: 9 ML/MIN/{1.73_M2}
GLUCOSE BLD-MCNC: 105 MG/DL (ref 70–99)
GLUCOSE SERPL-MCNC: 82 MG/DL (ref 70–99)
HCT VFR BLD AUTO: 34.2 % (ref 40.5–52.5)
HGB BLD-MCNC: 11.9 G/DL (ref 13.5–17.5)
LYMPHOCYTES # BLD: 0.8 K/UL (ref 1–5.1)
LYMPHOCYTES NFR BLD: 16.6 %
MCH RBC QN AUTO: 35.5 PG (ref 26–34)
MCHC RBC AUTO-ENTMCNC: 34.9 G/DL (ref 31–36)
MCV RBC AUTO: 101.7 FL (ref 80–100)
MONOCYTES # BLD: 0.5 K/UL (ref 0–1.3)
MONOCYTES NFR BLD: 10 %
NEUTROPHILS # BLD: 3.6 K/UL (ref 1.7–7.7)
NEUTROPHILS NFR BLD: 71.4 %
PERFORMED ON: ABNORMAL
PLATELET # BLD AUTO: 204 K/UL (ref 135–450)
PMV BLD AUTO: 7.8 FL (ref 5–10.5)
POTASSIUM SERPL-SCNC: 3.8 MMOL/L (ref 3.5–5.1)
PTH-INTACT SERPL-MCNC: 343 PG/ML (ref 14–72)
RBC # BLD AUTO: 3.36 M/UL (ref 4.2–5.9)
SODIUM SERPL-SCNC: 140 MMOL/L (ref 136–145)
WBC # BLD AUTO: 5 K/UL (ref 4–11)

## 2025-08-06 PROCEDURE — 6370000000 HC RX 637 (ALT 250 FOR IP): Performed by: INTERNAL MEDICINE

## 2025-08-06 PROCEDURE — 83970 ASSAY OF PARATHORMONE: CPT

## 2025-08-06 PROCEDURE — 36415 COLL VENOUS BLD VENIPUNCTURE: CPT

## 2025-08-06 PROCEDURE — 85025 COMPLETE CBC W/AUTO DIFF WBC: CPT

## 2025-08-06 PROCEDURE — 6360000002 HC RX W HCPCS: Performed by: INTERNAL MEDICINE

## 2025-08-06 PROCEDURE — 80048 BASIC METABOLIC PNL TOTAL CA: CPT

## 2025-08-06 PROCEDURE — 2500000003 HC RX 250 WO HCPCS: Performed by: INTERNAL MEDICINE

## 2025-08-06 RX ORDER — PANTOPRAZOLE SODIUM 40 MG/1
40 TABLET, DELAYED RELEASE ORAL
Status: DISCONTINUED | OUTPATIENT
Start: 2025-08-06 | End: 2025-08-06 | Stop reason: HOSPADM

## 2025-08-06 RX ORDER — MIDODRINE HYDROCHLORIDE 10 MG/1
10 TABLET ORAL
Qty: 90 TABLET | Refills: 3 | Status: SHIPPED | OUTPATIENT
Start: 2025-08-06

## 2025-08-06 RX ORDER — PANTOPRAZOLE SODIUM 40 MG/1
40 TABLET, DELAYED RELEASE ORAL 2 TIMES DAILY
Qty: 30 TABLET | Refills: 3 | Status: SHIPPED | OUTPATIENT
Start: 2025-08-06

## 2025-08-06 RX ADMIN — HEPARIN SODIUM 5000 UNITS: 5000 INJECTION, SOLUTION INTRAVENOUS; SUBCUTANEOUS at 05:36

## 2025-08-06 RX ADMIN — SODIUM CHLORIDE, PRESERVATIVE FREE 10 ML: 5 INJECTION INTRAVENOUS at 08:15

## 2025-08-06 RX ADMIN — MIDODRINE HYDROCHLORIDE 10 MG: 5 TABLET ORAL at 08:15
